# Patient Record
Sex: FEMALE | Race: BLACK OR AFRICAN AMERICAN | NOT HISPANIC OR LATINO | Employment: FULL TIME | URBAN - METROPOLITAN AREA
[De-identification: names, ages, dates, MRNs, and addresses within clinical notes are randomized per-mention and may not be internally consistent; named-entity substitution may affect disease eponyms.]

---

## 2017-02-28 ENCOUNTER — OFFICE VISIT (OUTPATIENT)
Dept: OBGYN | Facility: CLINIC | Age: 42
End: 2017-02-28
Payer: COMMERCIAL

## 2017-02-28 VITALS
WEIGHT: 158.3 LBS | TEMPERATURE: 98.7 F | HEART RATE: 70 BPM | BODY MASS INDEX: 28.05 KG/M2 | OXYGEN SATURATION: 100 % | HEIGHT: 63 IN | SYSTOLIC BLOOD PRESSURE: 118 MMHG | DIASTOLIC BLOOD PRESSURE: 80 MMHG

## 2017-02-28 DIAGNOSIS — Z01.419 ENCOUNTER FOR GYNECOLOGICAL EXAMINATION WITHOUT ABNORMAL FINDING: Primary | ICD-10-CM

## 2017-02-28 LAB
ALBUMIN SERPL-MCNC: 3.7 G/DL (ref 3.4–5)
ALP SERPL-CCNC: 75 U/L (ref 40–150)
ALT SERPL W P-5'-P-CCNC: 19 U/L (ref 0–50)
ANION GAP SERPL CALCULATED.3IONS-SCNC: 6 MMOL/L (ref 3–14)
AST SERPL W P-5'-P-CCNC: 17 U/L (ref 0–45)
BILIRUB SERPL-MCNC: 0.6 MG/DL (ref 0.2–1.3)
BUN SERPL-MCNC: 6 MG/DL (ref 7–30)
CALCIUM SERPL-MCNC: 8.6 MG/DL (ref 8.5–10.1)
CHLORIDE SERPL-SCNC: 106 MMOL/L (ref 94–109)
CHOLEST SERPL-MCNC: 169 MG/DL
CO2 SERPL-SCNC: 27 MMOL/L (ref 20–32)
CREAT SERPL-MCNC: 0.8 MG/DL (ref 0.52–1.04)
ERYTHROCYTE [DISTWIDTH] IN BLOOD BY AUTOMATED COUNT: 15.2 % (ref 10–15)
GFR SERPL CREATININE-BSD FRML MDRD: 79 ML/MIN/1.7M2
GLUCOSE SERPL-MCNC: 94 MG/DL (ref 70–99)
HCT VFR BLD AUTO: 36.8 % (ref 35–47)
HDLC SERPL-MCNC: 67 MG/DL
HGB BLD-MCNC: 11.6 G/DL (ref 11.7–15.7)
LDLC SERPL CALC-MCNC: 90 MG/DL
MCH RBC QN AUTO: 26.7 PG (ref 26.5–33)
MCHC RBC AUTO-ENTMCNC: 31.5 G/DL (ref 31.5–36.5)
MCV RBC AUTO: 85 FL (ref 78–100)
MICRO REPORT STATUS: NORMAL
NONHDLC SERPL-MCNC: 102 MG/DL
PLATELET # BLD AUTO: 191 10E9/L (ref 150–450)
POTASSIUM SERPL-SCNC: 3.8 MMOL/L (ref 3.4–5.3)
PROT SERPL-MCNC: 7.6 G/DL (ref 6.8–8.8)
RBC # BLD AUTO: 4.35 10E12/L (ref 3.8–5.2)
SODIUM SERPL-SCNC: 139 MMOL/L (ref 133–144)
SPECIMEN SOURCE: NORMAL
T4 FREE SERPL-MCNC: 0.91 NG/DL (ref 0.76–1.46)
TRIGL SERPL-MCNC: 62 MG/DL
TSH SERPL DL<=0.005 MIU/L-ACNC: 4.13 MU/L (ref 0.4–4)
WBC # BLD AUTO: 3.6 10E9/L (ref 4–11)
WET PREP SPEC: NORMAL

## 2017-02-28 PROCEDURE — 99396 PREV VISIT EST AGE 40-64: CPT | Performed by: OBSTETRICS & GYNECOLOGY

## 2017-02-28 PROCEDURE — 36415 COLL VENOUS BLD VENIPUNCTURE: CPT | Performed by: OBSTETRICS & GYNECOLOGY

## 2017-02-28 PROCEDURE — 80053 COMPREHEN METABOLIC PANEL: CPT | Performed by: OBSTETRICS & GYNECOLOGY

## 2017-02-28 PROCEDURE — 87591 N.GONORRHOEAE DNA AMP PROB: CPT | Performed by: OBSTETRICS & GYNECOLOGY

## 2017-02-28 PROCEDURE — 87491 CHLMYD TRACH DNA AMP PROBE: CPT | Performed by: OBSTETRICS & GYNECOLOGY

## 2017-02-28 PROCEDURE — 84443 ASSAY THYROID STIM HORMONE: CPT | Performed by: OBSTETRICS & GYNECOLOGY

## 2017-02-28 PROCEDURE — 85027 COMPLETE CBC AUTOMATED: CPT | Performed by: OBSTETRICS & GYNECOLOGY

## 2017-02-28 PROCEDURE — 80061 LIPID PANEL: CPT | Performed by: OBSTETRICS & GYNECOLOGY

## 2017-02-28 PROCEDURE — 87210 SMEAR WET MOUNT SALINE/INK: CPT | Performed by: OBSTETRICS & GYNECOLOGY

## 2017-02-28 PROCEDURE — G0145 SCR C/V CYTO,THINLAYER,RESCR: HCPCS | Performed by: OBSTETRICS & GYNECOLOGY

## 2017-02-28 PROCEDURE — 84439 ASSAY OF FREE THYROXINE: CPT | Performed by: OBSTETRICS & GYNECOLOGY

## 2017-02-28 PROCEDURE — 87624 HPV HI-RISK TYP POOLED RSLT: CPT | Performed by: OBSTETRICS & GYNECOLOGY

## 2017-02-28 RX ORDER — FOLIC ACID 1 MG/1
1 TABLET ORAL DAILY
Qty: 100 TABLET | Refills: 3 | Status: SHIPPED | OUTPATIENT
Start: 2017-02-28 | End: 2018-04-17

## 2017-02-28 NOTE — PATIENT INSTRUCTIONS
If you have any questions regarding your visit, Please contact your care team.    Women s Health CLINIC HOURS TELEPHONE NUMBER   Denise Agbeh, M.D.    America Roberts- SAUL Miles - SAUL Miles -         Monday-[facility]  8:00 am - 5 pm  Tuesday- [facility]  8:00am- 5 pm  Wednesday- Off  Thursday- [facility]  8:00 am- 5 pm  Friday-[place]  8:00 am 5 pm [facility]  [address] [place] [address]  [phone] ask for Women's [facility]    Imaging [phone]    [facility]  [address] [facility]  [place] [address] [phone]  Imaging [phone]     Urgent Care locations:    [facility] Saturday and Sunday   9 am - 5 pm    Monday-Friday   12 pm - 8 pm  Saturday and Sunday   9 am - 5 pm   [phone]       If you need a medication refill, please contact your pharmacy. Please allow 3 business days for your refill to be completed.  As always, Thank you for trusting us with your healthcare needs!

## 2017-02-28 NOTE — PROGRESS NOTES
"Elizabeth is a 41 year old  here for annual exam.   She is seeing  Infertility. As had some IUI and has upcoming appointment to discuss IVF.  Complaints of constipation and heartburn. Takes Miralalx and prilosec.  Back pains with ETOF . Will stop  Vaginal discharge.    ROS: Ten point review of systems was reviewed and negative except the above.    Health Maintenance   Topic Date Due     INFLUENZA VACCINE (SYSTEM ASSIGNED)  2016     PAP SCREENING Q3 YR (SYSTEM ASSIGNED)  2019     TETANUS IMMUNIZATION (SYSTEM ASSIGNED)  2024      Last pap:   Last Mammogram:   Last Dexa: none  Last Colonoscopy: none  Lab Results   Component Value Date    CHOL 169 2017     Lab Results   Component Value Date    HDL 67 2017     Lab Results   Component Value Date    LDL 90 2017     Lab Results   Component Value Date    TRIG 62 2017     No results found for: CHOLHDLRATIO      OBHX:    Obstetric History       T1      TAB0   SAB0   E0   M0   L2       # Outcome Date GA Lbr Rosales/2nd Weight Sex Delivery Anes PTL Lv   4 Term 03    M -SEC   Y   3 AB            2 AB            1 AB                   Past Surgical History   Procedure Laterality Date     Gyn surgery        x 2; fibroid surgeries x 1       PMH: Her past medical, surgical, and obstetric histories were reviewed and are documented in their appropriate chart areas.    ALL/Meds: Her medication and allergy histories were reviewed and are documented in their appropriate chart areas.    SH/FMH: Her social and family history was reviewed and documented in its appropriate chart area.    PE: /80  Pulse 70  Temp 98.7  F (37.1  C) (Oral)  Ht 1.6 m (5' 2.99\")  Wt 71.8 kg (158 lb 4.8 oz)  LMP 2017  SpO2 100%  BMI 28.05 kg/m2  Body mass index is 28.05 kg/(m^2).    General Appearance:  healthy, alert, active, no distress  Cardiovascular:  Regular rate and Rhythm  Neck: Supple, no adenopathy and " thyroid normal  Lungs:  Clear, without wheeze, rale or rhonchi  Breast: normal breast exam  Abdomen: Benign, Soft, flat, non-tender, No masses, organomegaly, No inguinal nodes and Bowel sounds normoactiveSoft, nontender.   Pelvic:       - Ext: Vulva and perineum are normal without lesion, mass or discharge        - Urethra: normal without discharge or scarring or hypermobility       - Urethral Meatus: normal appearance,        - Bladder: no tenderness, no masses       - Vagina: Normal mucosa, no discharge     rugated       - Cervix: nulliparous       - Uterus:Normal shape, position and consistencyfirm, nontender, nongravid uterus without CMT       - Adnexa: Normal without masses or tenderness       - Rectal: deferred    A/P:  Well Woman,     ICD-10-CM    1. Encounter for gynecological examination without abnormal finding Z01.419 CBC with platelets     Chlamydia trachomatis PCR     Comprehensive metabolic panel     Neisseria gonorrhoeae PCR     Lipid panel reflex to direct LDL     TSH with free T4 reflex     Wet prep     Pap imaged thin layer screen with HPV - recommended age 30 - 65 years (select HPV order below)     HPV High Risk Types DNA Cervical     folic acid (FOLVITE) 1 MG tablet     *MA Screening Digital Bilateral     T4 free     Normal wet prep.   - Encouraged self-breast exam   - Encouraged low fat diet, regular exercise, and adequate calcium intake.    CEPHAS AGBEH, MD.

## 2017-02-28 NOTE — MR AVS SNAPSHOT
After Visit Summary   2/28/2017    Elizabeth Jerome    MRN: [id number]           Patient Information     Date Of Birth          1975        Visit Information        Provider Department      2/28/2017 11:30 AM Agbeh, Cephas Mawuena, MD [facility]'s Diagnoses     Encounter for gynecological examination without abnormal finding    -  1      Care Instructions                                                           If you have any questions regarding your visit, Please contact your care team.    Women s Health CLINIC HOURS TELEPHONE NUMBER   Cephas Agbeh, M.D.    America Miles - SAUL Miles -         Monday-[facility]  8:00 am - 5 pm  Tuesday- [facility]  8:00am- 5 pm  Wednesday- Off  Thursday- [facility]  8:00 am- 5 pm  Friday-[place]  8:00 am 5 pm [facility]  [address] [place] [address]  [phone] ask for Women's Clinic    Imaging [phone]    [facility]  [address] [facility]  [place] [address]  [phone]  Imaging [phone]     Urgent Care locations:    [facility] Saturday and Sunday   9 am - 5 pm    Monday-Friday   12 pm - 8 pm  Saturday and Sunday   9 am - 5 pm   [phone]       If you need a medication refill, please contact your pharmacy. Please allow 3 business days for your refill to be completed.  As always, Thank you for trusting us with your healthcare needs!                Follow-ups after your visit        Follow-up notes from your care team     Return in about 1 year (around 2/28/2018).      Future tests that were ordered for you today     Open Future Orders        Priority Expected Expires Ordered    *MA Screening Digital Bilateral Routine  2/28/2018 2/28/2017            Who to contact     If you have questions or need follow up information about today's clinic visit or your schedule please  "contact [facility] directly at [phone].  Normal or non-critical lab and imaging results will be communicated to you by MyChart, letter or phone within 4 business days after the clinic has received the results. If you do not hear from us within 7 days, please contact the clinic through MyChart or phone. If you have a critical or abnormal lab result, we will notify you by phone as soon as possible.  Submit refill requests through [employer]Ranks or call your pharmacy and they will forward the refill request to us. Please allow 3 business days for your refill to be completed.          Additional Information About Your Visit        [employer] Information     TipRanks lets you send messages to your doctor, view your test results, renew your prescriptions, schedule appointments and more. To sign up, go to [email or web address] . Click on \"Log in\" on the left side of the screen, which will take you to the Welcome page. Then click on \"Sign up Now\" on the right side of the page.     You will be asked to enter the access code listed below, as well as some personal information. Please follow the directions to create your username and password.     Your access code is: [email or web address]  Expires: 2017  1:13 PM     Your access code will  in 90 days. If you need help or a new code, please call your [place] clinic or [phone].        Care EveryWhere ID     This is your Care EveryWhere ID. This could be used by other organizations to access your [place] medical records  [id number]        Your Vitals Were     Pulse Temperature Height Last Period Pulse Oximetry BMI (Body Mass Index)    70 98.7  F (37.1  C) (Oral) 1.6 m (5' 2.99\") 2017 100% 28.05 kg/m2       Blood Pressure from Last 3 Encounters:   17 118/80   10/24/16 106/68   16 118/70    Weight from Last 3 Encounters:   17 71.8 kg (158 lb 4.8 oz)   10/24/16 71.8 kg (158 lb 6.4 oz)   16 70.6 kg (155 lb 11.2 oz)              We " Performed the Following     CBC with platelets     Chlamydia trachomatis PCR     Comprehensive metabolic panel     HPV High Risk Types DNA Cervical     Lipid panel reflex to direct LDL     Neisseria gonorrhoeae PCR     Pap imaged thin layer screen with HPV - recommended age 30 - 65 years (select HPV order below)     T4 free     TSH with free T4 reflex     Wet prep          Today's Medication Changes          These changes are accurate as of: 2/28/17  1:13 PM.  If you have any questions, ask your nurse or doctor.               These medicines have changed or have updated prescriptions.        Dose/Directions    * folic acid 1 MG tablet   Commonly known as:  FOLVITE   This may have changed:  Another medication with the same name was added. Make sure you understand how and when to take each.   Changed by:  Jose Alberto Francois MD        Dose:  1 mg   Take 1 tablet (1 mg) by mouth daily   Quantity:  100 tablet   Refills:  3       * folic acid 1 MG tablet   Commonly known as:  FOLVITE   This may have changed:  You were already taking a medication with the same name, and this prescription was added. Make sure you understand how and when to take each.   Used for:  Encounter for gynecological examination without abnormal finding   Changed by:  Agbeh, Cephas Mawuena, MD        Dose:  1 mg   Take 1 tablet (1 mg) by mouth daily   Quantity:  100 tablet   Refills:  3       * Notice:  This list has 2 medication(s) that are the same as other medications prescribed for you. Read the directions carefully, and ask your doctor or other care provider to review them with you.         Where to get your medicines      These medications were sent to [facility] [address] [place] [address] [address] [facility] [address] [place] [address]  [address] [facility] [address] [address] [facility] [address]     Phone:  [phone]     folic acid 1 MG tablet                Primary Care Provider Office Phone # Fax #    Jose Ablerto Francois [phone]       [id number]  [facility]  [facility] [address]        Thank you!     Thank you for choosing [facility]  for your care. Our goal is always to provide you with excellent care. Hearing back from our patients is one way we can continue to improve our services. Please take a few minutes to complete the written survey that you may receive in the mail after your visit with us. Thank you!             Your Updated Medication List - Protect others around you: Learn how to safely use, store and throw away your medicines at www.disposemymeds.org.          This list is accurate as of: 2/28/17  1:13 PM.  Always use your most recent med list.                   Brand Name Dispense Instructions for use    * folic acid 1 MG tablet    FOLVITE    100 tablet    Take 1 tablet (1 mg) by mouth daily       * folic acid 1 MG tablet    FOLVITE    100 tablet    Take 1 tablet (1 mg) by mouth daily       * Notice:  This list has 2 medication(s) that are the same as other medications prescribed for you. Read the directions carefully, and ask your doctor or other care provider to review them with you.

## 2017-02-28 NOTE — NURSING NOTE
"Chief Complaint   Patient presents with     Physical     Pap       Initial /80  Pulse 70  Temp 98.7  F (37.1  C) (Oral)  Ht 1.6 m (5' 2.99\")  Wt 71.8 kg (158 lb 4.8 oz)  LMP 02/05/2017  SpO2 100%  BMI 28.05 kg/m2 Estimated body mass index is 28.05 kg/(m^2) as calculated from the following:    Height as of this encounter: 1.6 m (5' 2.99\").    Weight as of this encounter: 71.8 kg (158 lb 4.8 oz).  Medication Reconciliation: complete   Sherrie Valadez CMA      "

## 2017-02-28 NOTE — LETTER
[facility]  [address] [place] [id number]  [phone]      March 7, 2017    Elizabeth Jerome  [address] [place] [address]    Dear Elizabeth,  We are happy to inform you that your PAP smear result from 2/28/17 is normal.  We are now able to do a follow up test on PAP smears. The DNA test is for HPV (Human Papilloma Virus). Cervical cancer is closely linked with certain types of HPV. Your result showed no evidence of high risk HPV.  Therefore we recommend you return in 3 years for your next pap smear.  You will still need to return to the clinic every year for an annual exam and other preventive tests.  Please contact the clinic with any questions.  Sincerely,  Cephas Mawuena Agbeh, MD/lauren

## 2017-03-01 LAB
C TRACH DNA SPEC QL NAA+PROBE: NORMAL
N GONORRHOEA DNA SPEC QL NAA+PROBE: NORMAL
SPECIMEN SOURCE: NORMAL
SPECIMEN SOURCE: NORMAL

## 2017-03-01 ASSESSMENT — PATIENT HEALTH QUESTIONNAIRE - PHQ9: SUM OF ALL RESPONSES TO PHQ QUESTIONS 1-9: 12

## 2017-03-02 LAB
COPATH REPORT: NORMAL
PAP: NORMAL

## 2017-03-06 LAB
FINAL DIAGNOSIS: NORMAL
HPV HR 12 DNA CVX QL NAA+PROBE: NEGATIVE
HPV16 DNA SPEC QL NAA+PROBE: NEGATIVE
HPV18 DNA SPEC QL NAA+PROBE: NEGATIVE
SPECIMEN DESCRIPTION: NORMAL

## 2017-03-16 ENCOUNTER — RADIANT APPOINTMENT (OUTPATIENT)
Dept: MAMMOGRAPHY | Facility: CLINIC | Age: 42
End: 2017-03-16
Attending: OBSTETRICS & GYNECOLOGY
Payer: COMMERCIAL

## 2017-03-16 DIAGNOSIS — Z01.419 ENCOUNTER FOR GYNECOLOGICAL EXAMINATION WITHOUT ABNORMAL FINDING: ICD-10-CM

## 2017-03-16 PROCEDURE — G0202 SCR MAMMO BI INCL CAD: HCPCS

## 2017-08-22 ENCOUNTER — OFFICE VISIT (OUTPATIENT)
Dept: OBGYN | Facility: CLINIC | Age: 42
End: 2017-08-22
Payer: COMMERCIAL

## 2017-08-22 VITALS
SYSTOLIC BLOOD PRESSURE: 116 MMHG | WEIGHT: 161.3 LBS | BODY MASS INDEX: 28.58 KG/M2 | OXYGEN SATURATION: 100 % | HEART RATE: 72 BPM | TEMPERATURE: 98.6 F | DIASTOLIC BLOOD PRESSURE: 78 MMHG

## 2017-08-22 DIAGNOSIS — N97.9 FEMALE INFERTILITY: Primary | ICD-10-CM

## 2017-08-22 PROCEDURE — 99214 OFFICE O/P EST MOD 30 MIN: CPT | Performed by: OBSTETRICS & GYNECOLOGY

## 2017-08-22 RX ORDER — MULTIPLE VITAMINS W/ MINERALS TAB 9MG-400MCG
1 TAB ORAL DAILY
COMMUNITY
End: 2019-09-04

## 2017-08-22 NOTE — NURSING NOTE
"Chief Complaint   Patient presents with     Consult     Infertility       Initial /78  Pulse 72  Temp 98.6  F (37  C) (Oral)  Wt 73.2 kg (161 lb 4.8 oz)  LMP 08/17/2017  SpO2 100%  BMI 28.58 kg/m2 Estimated body mass index is 28.58 kg/(m^2) as calculated from the following:    Height as of 2/28/17: 1.6 m (5' 2.99\").    Weight as of this encounter: 73.2 kg (161 lb 4.8 oz).  Medication Reconciliation: complete   Sherrie Valadez CMA      "

## 2017-08-22 NOTE — PATIENT INSTRUCTIONS
If you have any questions regarding your visit, Please contact your care team.    Women s Health CLINIC HOURS TELEPHONE NUMBER   Denises Agbeh, M.D.    America Roberts- SAUL Wei - SAUL Miles -         Monday-[facility]  8:00 am - 5 pm  Tuesday- [facility]  8:00am- 5 pm  Wednesday- Off  Thursday- [facility]  8:00 am- 5 pm  Friday-[place]  8:00 am 5 pm [facility]  [address] [place] [address]  [phone] ask for Women's [facility]    Imaging [phone]    [facility]  [address] [facility]  [place] [address] [phone]  Imaging [phone]     Urgent Care locations:    [facility] Saturday and Sunday   9 am - 5 pm    Monday-Friday   12 pm - 8 pm  Saturday and Sunday   9 am - 5 pm   [phone]       If you need a medication refill, please contact your pharmacy. Please allow 3 business days for your refill to be completed.  As always, Thank you for trusting us with your healthcare needs!

## 2017-08-22 NOTE — PROGRESS NOTES
Elizabeth is a 42 year old  referred here by self for consultation regarding infertility.  [facility]  Her next step she was advised was IVF.   She wants to know if there are other options before IVF.  She said the Infertility specialist told her the difficulty in getting pregnant is her age.    ROS: Ten point review of systems was reviewed and negative except the above.    Gyne: - abn pap (last pap ), - STD's    Past Medical History:   Diagnosis Date     NO ACTIVE PROBLEMS      Past Surgical History:   Procedure Laterality Date     GYN SURGERY       x 2; fibroid surgeries x 1     Patient Active Problem List   Diagnosis     Intramural leiomyoma of uterus       ALL/Meds: Her medication and allergy histories were reviewed and are documented in their appropriate chart areas.    SH: - tob, - EtOH,     FH: Her family history was reviewed and documented in its appropriate chart area.    PE: /78  Pulse 72  Temp 98.6  F (37  C) (Oral)  Wt 73.2 kg (161 lb 4.8 oz)  LMP 2017  SpO2 100%  BMI 28.58 kg/m2  Body mass index is 28.58 kg/(m^2).      General:  WNWD female, NAD  Alert  Oriented x 3  Gait:  Normal  Skin:  Normal skin turgor  HEENT:  NC/AT, EOMI  Abdomen:  Non-tender, non-distended.  Pelvic exam:  Not performed  Extremities:  No clubbing, no cyanosis and no edema.    A/P    ICD-10-CM    1. Female infertility N97.9      We discussed  RBA of Advance Maternal age with pregnancy.  We also discussed other options of surrogate pregnancy vs donor egg from a youger woman.  We also discussed adoption as another option as having a baby.  She is leaning towards IVF with [facility].    25 minutes was spent face to face with the patient today discussing her history, diagnosis, and follow-up plan as noted above.  Over 50% of the visit was spent in counseling and coordination of care.    Total Visit Time: 30  minutes.    CEPHAS AGBEH, MD.

## 2017-08-22 NOTE — MR AVS SNAPSHOT
After Visit Summary   8/22/2017    Elizabeth Jerome    MRN: [id number]           Patient Information     Date Of Birth          1975        Visit Information        Provider Department      8/22/2017 1:30 PM Agbeh, Cephas Mawuena, MD [facility]        Today's Diagnoses     Female infertility    -  1      Care Instructions                                                           If you have any questions regarding your visit, Please contact your care team.    Women s Health CLINIC HOURS TELEPHONE NUMBER   Cephas Agbeh, M.D.    [place] - GREGORIO Wei - SAUL Miles -         Monday-[facility]  8:00 am - 5 pm  Tuesday- [facility]  8:00am- 5 pm  Wednesday- Off  Thursday- [facility]  8:00 am- 5 pm  Friday-[place]  8:00 am 5 pm [facility]  [address]  [place], [place] [address] [phone] ask for [facility]    Imaging [phone]    [facility]  [address] [facility]  [place] [address] [phone]  Imaging [phone]     Urgent Care locations:    [facility] Saturday and Sunday   9 am - 5 pm    Monday-Friday   12 pm - 8 pm  Saturday and Sunday   9 am - 5 pm   [phone]       If you need a medication refill, please contact your pharmacy. Please allow 3 business days for your refill to be completed.  As always, Thank you for trusting us with your healthcare needs!                  Follow-ups after your visit        Who to contact     If you have questions or need follow up information about today's clinic visit or your schedule please contact [facility] directly at [phone].  Normal or non-critical lab and imaging results will be communicated to you by MyChart, letter or phone within 4 business days after the clinic has received the results. If you do not hear from us within 7 days, please contact the clinic through [employer] or  "phone. If you have a critical or abnormal lab result, we will notify you by phone as soon as possible.  Submit refill requests through [employer] or call your pharmacy and they will forward the refill request to us. Please allow 3 business days for your refill to be completed.          Additional Information About Your Visit        [employer] Information     [employer] lets you send messages to your doctor, view your test results, renew your prescriptions, schedule appointments and more. To sign up, go to [email or web address] . Click on \"Log in\" on the left side of the screen, which will take you to the Welcome page. Then click on \"Sign up Now\" on the right side of the page.     You will be asked to enter the access code listed below, as well as some personal information. Please follow the directions to create your username and password.     Your access code is: [email or web address]  Expires: 2017  2:19 PM     Your access code will  in 90 days. If you need help or a new code, please call your [place] clinic or [phone].        Care EveryWhere ID     This is your Care EveryWhere ID. This could be used by other organizations to access your [place] medical records  [id number]        Your Vitals Were     Pulse Temperature Last Period Pulse Oximetry BMI (Body Mass Index)       72 98.6  F (37  C) (Oral) 2017 100% 28.58 kg/m2        Blood Pressure from Last 3 Encounters:   17 116/78   17 118/80   10/24/16 106/68    Weight from Last 3 Encounters:   17 73.2 kg (161 lb 4.8 oz)   17 71.8 kg (158 lb 4.8 oz)   10/24/16 71.8 kg (158 lb 6.4 oz)              Today, you had the following     No orders found for display       Primary Care Provider Office Phone # Fax #    Denise Mawuena Agbeh, [phone] [address] [place] [address]        Equal Access to Services     SAMANTHA SHELL AH: roque Ventura, raúl augustinmada adeegyada, waxay " janette gomezkeny w[place]'aan ah. [facility] [phone].    ATENCIÓN: Si serala eleno, tiene a ibrahim disposición servicios gratuitos de asistencia lingüística. Priti al [phone].    We comply with applicable federal civil rights laws and [place] laws. We do not discriminate on the basis of race, color, national origin, age, disability sex, sexual orientation or gender identity.            Thank you!     Thank you for choosing [facility]  for your care. Our goal is always to provide you with excellent care. Hearing back from our patients is one way we can continue to improve our services. Please take a few minutes to complete the written survey that you may receive in the mail after your visit with us. Thank you!             Your Updated Medication List - Protect others around you: Learn how to safely use, store and throw away your medicines at www.disposemymeds.org.          This list is accurate as of: 8/22/17  2:19 PM.  Always use your most recent med list.                   Brand Name Dispense Instructions for use Diagnosis    * folic acid 1 MG tablet    FOLVITE    100 tablet    Take 1 tablet (1 mg) by mouth daily        * folic acid 1 MG tablet    FOLVITE    100 tablet    Take 1 tablet (1 mg) by mouth daily    Encounter for gynecological examination without abnormal finding       Multi-vitamin Tabs tablet      Take 1 tablet by mouth daily        * Notice:  This list has 2 medication(s) that are the same as other medications prescribed for you. Read the directions carefully, and ask your doctor or other care provider to review them with you.

## 2017-12-21 DIAGNOSIS — Z31.41 FERTILITY TESTING: Primary | ICD-10-CM

## 2017-12-22 DIAGNOSIS — Z31.41 FERTILITY TESTING: ICD-10-CM

## 2017-12-22 LAB
ESTRADIOL SERPL-MCNC: 36 PG/ML
FSH SERPL-ACNC: 6 IU/L

## 2017-12-22 PROCEDURE — 83001 ASSAY OF GONADOTROPIN (FSH): CPT | Performed by: OBSTETRICS & GYNECOLOGY

## 2017-12-22 PROCEDURE — 99000 SPECIMEN HANDLING OFFICE-LAB: CPT | Performed by: OBSTETRICS & GYNECOLOGY

## 2017-12-22 PROCEDURE — 83520 IMMUNOASSAY QUANT NOS NONAB: CPT | Mod: 90 | Performed by: OBSTETRICS & GYNECOLOGY

## 2017-12-22 PROCEDURE — 36415 COLL VENOUS BLD VENIPUNCTURE: CPT | Performed by: OBSTETRICS & GYNECOLOGY

## 2017-12-22 PROCEDURE — 82670 ASSAY OF TOTAL ESTRADIOL: CPT | Performed by: OBSTETRICS & GYNECOLOGY

## 2017-12-24 LAB — MIS SERPL-MCNC: 2.74 NG/ML

## 2018-03-20 ENCOUNTER — OFFICE VISIT (OUTPATIENT)
Dept: OBGYN | Facility: CLINIC | Age: 43
End: 2018-03-20
Payer: COMMERCIAL

## 2018-03-20 VITALS
SYSTOLIC BLOOD PRESSURE: 122 MMHG | WEIGHT: 162 LBS | HEIGHT: 63 IN | DIASTOLIC BLOOD PRESSURE: 82 MMHG | HEART RATE: 72 BPM | BODY MASS INDEX: 28.7 KG/M2

## 2018-03-20 DIAGNOSIS — N97.9 INFERTILITY, FEMALE, SECONDARY: Primary | ICD-10-CM

## 2018-03-20 PROCEDURE — 99213 OFFICE O/P EST LOW 20 MIN: CPT | Performed by: OBSTETRICS & GYNECOLOGY

## 2018-03-20 ASSESSMENT — ANXIETY QUESTIONNAIRES
7. FEELING AFRAID AS IF SOMETHING AWFUL MIGHT HAPPEN: NOT AT ALL
6. BECOMING EASILY ANNOYED OR IRRITABLE: NOT AT ALL
3. WORRYING TOO MUCH ABOUT DIFFERENT THINGS: NOT AT ALL
5. BEING SO RESTLESS THAT IT IS HARD TO SIT STILL: NOT AT ALL
2. NOT BEING ABLE TO STOP OR CONTROL WORRYING: NOT AT ALL
GAD7 TOTAL SCORE: 0
IF YOU CHECKED OFF ANY PROBLEMS ON THIS QUESTIONNAIRE, HOW DIFFICULT HAVE THESE PROBLEMS MADE IT FOR YOU TO DO YOUR WORK, TAKE CARE OF THINGS AT HOME, OR GET ALONG WITH OTHER PEOPLE: NOT DIFFICULT AT ALL
1. FEELING NERVOUS, ANXIOUS, OR ON EDGE: NOT AT ALL

## 2018-03-20 ASSESSMENT — PATIENT HEALTH QUESTIONNAIRE - PHQ9: 5. POOR APPETITE OR OVEREATING: NOT AT ALL

## 2018-03-20 NOTE — PROGRESS NOTES
SUBJECTIVE:                                                   Elizabeth Jerome is a 42 year old female who presents to clinic today for the following health issue(s):  Patient presents with:  Consult: Discuss infertility- has been trying to get pregnant for about 2.5 yrs      HPI:  Elizabeth presents for second or essentially third opinion on secondary infertility.  She states that she had difficulty conceiving initially but after a procedure on her cervix in [place] in  for cervical dysplasia and after open myomectomy also in  she was able to conceive and she subsequently had a  section in  with a different partner.  At [facility] in 2014 she had another open myomectomy for removal of additional fibroids.  With her current fiancé she has been attempting to conceive for the past 2-1/2 years.  She has had a normal hysterosalpingogram she has a normal antimalarial hormone level of 2.7 and has a normal FSH and estradiol level.  She states that she had 3 rounds of intrauterine insemination using Clomid at [facility] with no successful conception.  She also states that her current partner may have had a low sperm count.  She obtained a second opinion from another reproductive specialist who recommended IVF using her eggs or with donor eggs.  Resents today wondering if her cervix is occluded and that is why she is not able to conceive.  She is hesitant to proceed with IVF fertilization because of the high cost and the lack of guarantee given her age.    Patient's last menstrual period was 2018 (exact date)..   Patient is sexually active, .  Using none for contraception.    reports that she has never smoked. She has never used smokeless tobacco.    STD testing offered?  Declined    Health maintenance updated:  yes    Today's PHQ-2 Score:   PHQ-2 (  [employer]) 3/7/2016   Q1: Little interest or pleasure in doing things 0   Q2: Feeling down, depressed or hopeless 0   PHQ-2 Score 0  "    Today's PHQ-9 Score:   PHQ-9 SCORE 3/20/2018   Total Score 6     Today's MILEY-7 Score:   MILEY-7 SCORE 3/20/2018   Total Score 0       Problem list and histories reviewed & adjusted, as indicated.  Additional history: as documented.    Patient Active Problem List   Diagnosis     Intramural leiomyoma of uterus     Past Surgical History:   Procedure Laterality Date      SECTION  2003     GYN SURGERY  10/2014, 2002    Abdominal Myomectomies x 2      Social History   Substance Use Topics     Smoking status: Never Smoker     Smokeless tobacco: Never Used     Alcohol use 0.0 oz/week     0 Standard drinks or equivalent per week      Comment: rare      Problem (# of Occurrences) Relation (Name,Age of Onset)    DIABETES (1) Maternal Grandmother    Hyperlipidemia (2) Mother, Brother    Hypertension (2) Mother, Father            Current Outpatient Prescriptions   Medication Sig     Ferrous Sulfate (IRON SUPPLEMENT PO)      multivitamin, therapeutic with minerals (MULTI-VITAMIN) TABS tablet Take 1 tablet by mouth daily     folic acid (FOLVITE) 1 MG tablet Take 1 tablet (1 mg) by mouth daily     No current facility-administered medications for this visit.      No Known Allergies    ROS:  12 point review of systems negative other than symptoms noted below.    OBJECTIVE:     /82  Pulse 72  Ht 5' 2.99\" (1.6 m)  Wt 162 lb (73.5 kg)  LMP 2018 (Exact Date)  BMI 28.71 kg/m2  Body mass index is 28.71 kg/(m^2).    Exam:  Constitutional:  Appearance: Well nourished, well developed alert, in no acute distress  Skin:General Inspection:  No rashes present, no lesions present, no areas of discoloration; Genitalia and Groin:  No rashes present, no lesions present, no areas of discoloration, no masses present.  Neurologic/Psychiatric:  Mental Status:  Oriented X3   Pelvic Exam:  External Genitalia:     Normal appearance for age, no discharge present, no tenderness present, no inflammatory lesions present, " color normal  Vagina:     Normal vaginal vault without central or paravaginal defects, no discharge present, no inflammatory lesions present, no masses present  Bladder:     Nontender to palpation  Urethra:   Urethral Body:  Urethra palpation normal, urethra structural support normal   Urethral Meatus:  No erythema or lesions present  Cervix:     Appearance healthy, no lesions present, nontender to palpation, no bleeding present, cervix was cleansed with betadine and the anterior lip of the cervix was grasped with a single toothed tenaculum and an os finder was used to dilate an initially stenotic cervix. The uterine sound was then advanced and uterus sounded to 8 cm.  Uterus:     Uterus: firm, normal sized and nontender, midplane in position.   Adnexa:     No adnexal tenderness present, no adnexal masses present  Pubic Hair:     shaved  Genitalia and Groin:     No rashes present, no lesions present, no areas of discoloration, no masses present       In-Clinic Test Results:  No results found for this or any previous visit (from the past 24 hour(s)).    ASSESSMENT/PLAN:                                                        ICD-10-CM    1. Infertility, female, secondary N97.9 CANCELED: Anti-Mullerian hormone     CANCELED: TSH with free T4 reflex   Elizabeth was counseled that her best chance for successful conception would be IVF especially in the setting of a male factor. I explained that her cervix appeared normal and the slight stenosis of her internal os was not outside of the range of normal. She was asked to discuss with her partner about their desire to conceive and to return to the Infertility Specialist of her choosing.    Laura Mixon MD  [facility] [place]

## 2018-03-20 NOTE — MR AVS SNAPSHOT
"              After Visit Summary   3/20/2018    Elizabeth Jerome    MRN: [id number]           Patient Information     Date Of Birth          1975        Visit Information        Provider Department      3/20/2018 11:00 AM Laura Mixon MD [facility]        Today's Diagnoses     Infertility, female, secondary    -  1       Follow-ups after your visit        Who to contact     If you have questions or need follow up information about today's clinic visit or your schedule please contact [facility] directly at [phone].  Normal or non-critical lab and imaging results will be communicated to you by [employer], letter or phone within 4 business days after the clinic has received the results. If you do not hear from us within 7 days, please contact the clinic through [employer] or phone. If you have a critical or abnormal lab result, we will notify you by phone as soon as possible.  Submit refill requests through [employer] or call your pharmacy and they will forward the refill request to us. Please allow 3 business days for your refill to be completed.          Additional Information About Your Visit        MyChart Information     [employer] lets you send messages to your doctor, view your test results, renew your prescriptions, schedule appointments and more. To sign up, go to [email or web address] . Click on \"Log in\" on the left side of the screen, which will take you to the Welcome page. Then click on \"Sign up Now\" on the right side of the page.     You will be asked to enter the access code listed below, as well as some personal information. Please follow the directions to create your username and password.     Your access code is: [email or web address]  Expires: 2018  1:39 PM     Your access code will  in 90 days. If you need help or a new code, please call your [place] clinic or [phone].        Care EveryWhere ID     This is your Care EveryWhere ID. This " "could be used by other organizations to access your [place] medical records  [id number]        Your Vitals Were     Pulse Height Last Period BMI (Body Mass Index)          72 5' 2.99\" (1.6 m) 03/14/2018 (Exact Date) 28.71 kg/m2         Blood Pressure from Last 3 Encounters:   03/20/18 122/82   08/22/17 116/78   02/28/17 118/80    Weight from Last 3 Encounters:   03/20/18 162 lb (73.5 kg)   08/22/17 161 lb 4.8 oz (73.2 kg)   02/28/17 158 lb 4.8 oz (71.8 kg)              Today, you had the following     No orders found for display       Primary Care Provider Office Phone # Fax #    Denise Mawuena Agbeh, [phone] [address] [place] [address]        Equal Access to Services     [facility]: Hadii aad ku hadasho Soponchoali, waaxda luqadaha, qaybta kaalmada adeegyada, abad santiago . So [facility] [phone].    ATENCIÓN: Si serala rheaañol, tiene a ibrahim disposición servicios gratuitos de asistencia lingüística. Llame al [phone].    We comply with applicable federal civil rights laws and [place] laws. We do not discriminate on the basis of race, color, national origin, age, disability, sex, sexual orientation, or gender identity.            Thank you!     Thank you for choosing [facility]  for your care. Our goal is always to provide you with excellent care. Hearing back from our patients is one way we can continue to improve our services. Please take a few minutes to complete the written survey that you may receive in the mail after your visit with us. Thank you!             Your Updated Medication List - Protect others around you: Learn how to safely use, store and throw away your medicines at www.disposemymeds.org.          This list is accurate as of 3/20/18  1:39 PM.  Always use your most recent med list.                   Brand Name Dispense Instructions for use Diagnosis    folic acid 1 MG tablet    FOLVITE    100 tablet    " Take 1 tablet (1 mg) by mouth daily    Encounter for gynecological examination without abnormal finding       IRON SUPPLEMENT PO           Multi-vitamin Tabs tablet      Take 1 tablet by mouth daily

## 2018-03-21 ASSESSMENT — PATIENT HEALTH QUESTIONNAIRE - PHQ9: SUM OF ALL RESPONSES TO PHQ QUESTIONS 1-9: 6

## 2018-03-21 ASSESSMENT — ANXIETY QUESTIONNAIRES: GAD7 TOTAL SCORE: 0

## 2018-04-09 DIAGNOSIS — Z01.419 ENCOUNTER FOR GYNECOLOGICAL EXAMINATION WITHOUT ABNORMAL FINDING: ICD-10-CM

## 2018-04-09 RX ORDER — FOLIC ACID 1 MG/1
1 TABLET ORAL DAILY
Qty: 100 TABLET | Refills: 3 | OUTPATIENT
Start: 2018-04-09

## 2018-04-17 ENCOUNTER — OFFICE VISIT (OUTPATIENT)
Dept: OBGYN | Facility: CLINIC | Age: 43
End: 2018-04-17
Payer: COMMERCIAL

## 2018-04-17 ENCOUNTER — RADIANT APPOINTMENT (OUTPATIENT)
Dept: MAMMOGRAPHY | Facility: CLINIC | Age: 43
End: 2018-04-17
Payer: COMMERCIAL

## 2018-04-17 VITALS
SYSTOLIC BLOOD PRESSURE: 112 MMHG | HEIGHT: 63 IN | DIASTOLIC BLOOD PRESSURE: 72 MMHG | WEIGHT: 162 LBS | BODY MASS INDEX: 28.7 KG/M2

## 2018-04-17 DIAGNOSIS — Z13.220 ENCOUNTER FOR LIPID SCREENING FOR CARDIOVASCULAR DISEASE: ICD-10-CM

## 2018-04-17 DIAGNOSIS — Z13.6 ENCOUNTER FOR LIPID SCREENING FOR CARDIOVASCULAR DISEASE: ICD-10-CM

## 2018-04-17 DIAGNOSIS — Z13.1 SCREENING FOR DIABETES MELLITUS: ICD-10-CM

## 2018-04-17 DIAGNOSIS — E61.1 LOW IRON: ICD-10-CM

## 2018-04-17 DIAGNOSIS — Z13.29 SCREENING FOR THYROID DISORDER: ICD-10-CM

## 2018-04-17 DIAGNOSIS — Z01.419 ENCOUNTER FOR GYNECOLOGICAL EXAMINATION WITHOUT ABNORMAL FINDING: Primary | ICD-10-CM

## 2018-04-17 DIAGNOSIS — Z12.31 VISIT FOR SCREENING MAMMOGRAM: ICD-10-CM

## 2018-04-17 LAB
GLUCOSE BLD-MCNC: 86 MG/DL (ref 70–99)
HGB BLD-MCNC: 12 G/DL (ref 11.7–15.7)

## 2018-04-17 PROCEDURE — 99396 PREV VISIT EST AGE 40-64: CPT | Performed by: OBSTETRICS & GYNECOLOGY

## 2018-04-17 PROCEDURE — 77067 SCR MAMMO BI INCL CAD: CPT | Mod: TC

## 2018-04-17 PROCEDURE — 36415 COLL VENOUS BLD VENIPUNCTURE: CPT | Performed by: OBSTETRICS & GYNECOLOGY

## 2018-04-17 PROCEDURE — 84443 ASSAY THYROID STIM HORMONE: CPT | Performed by: OBSTETRICS & GYNECOLOGY

## 2018-04-17 PROCEDURE — 82947 ASSAY GLUCOSE BLOOD QUANT: CPT | Performed by: OBSTETRICS & GYNECOLOGY

## 2018-04-17 PROCEDURE — 85018 HEMOGLOBIN: CPT | Performed by: OBSTETRICS & GYNECOLOGY

## 2018-04-17 PROCEDURE — 80061 LIPID PANEL: CPT | Performed by: OBSTETRICS & GYNECOLOGY

## 2018-04-17 RX ORDER — FOLIC ACID 1 MG/1
1 TABLET ORAL DAILY
Qty: 100 TABLET | Refills: 3 | Status: SHIPPED | OUTPATIENT
Start: 2018-04-17 | End: 2019-09-04

## 2018-04-17 NOTE — PROGRESS NOTES
Elizabeth is a 42 year old  female who presents for annual exam.     Besides routine health maintenance, patient is fasting for labs today. And believe has never been tested for herpes and would like that test.    HPI:  Here for annual exam. Would like fasting labs as she is fasting. Also presents for mammogram as well.   The patient does not have a primary care provider.      GYNECOLOGIC HISTORY:    Patient's last menstrual period was 2018.  Her current contraception method is: none.  She  reports that she has never smoked. She has never used smokeless tobacco.  Patient is sexually active.  STD testing offered?  Accepted       Last PHQ-9 score on record =   PHQ-9 SCORE 3/20/2018   Total Score 6     Last GAD7 score on record =   MILEY-7 SCORE 3/20/2018   Total Score 0     Alcohol Score = 3    HEALTH MAINTENANCE:  Cholesterol:   Cholesterol   Date Value Ref Range Status   2017 169 <200 mg/dL Final   Last Mammo: 3/16/17, Result: normal, Next Mammo: today   Pap:   Lab Results   Component Value Date    PAP NIL 2017   Colonoscopy:  never, Result: not applicable, Next Colonoscopy: due age 50  Dexa:  Never    Health maintenance updated:  yes    HISTORY:  Obstetric History       T1      L1     SAB0   TAB3   Ectopic0   Multiple0   Live Births1       # Outcome Date GA Lbr Rosales/2nd Weight Sex Delivery Anes PTL Lv   4 Term 03    M -SEC   CHRISTINE   3 TAB            2 TAB            1 TAB                   Patient Active Problem List   Diagnosis     Intramural leiomyoma of uterus     Past Surgical History:   Procedure Laterality Date      SECTION  2003     GYN SURGERY  10/2014, 2002    Abdominal Myomectomies x 2      Social History   Substance Use Topics     Smoking status: Never Smoker     Smokeless tobacco: Never Used     Alcohol use 0.0 oz/week     0 Standard drinks or equivalent per week      Comment: rare      Problem (# of Occurrences) Relation (Name,Age of  "Onset)    DIABETES (1) Maternal Grandmother    Hyperlipidemia (2) Mother, Brother    Hypertension (2) Mother, Father            Current Outpatient Prescriptions   Medication Sig     Cholecalciferol (VITAMIN D PO)      VITAMIN E PO      vitamin B complex with vitamin C (VITAMIN  B COMPLEX) TABS tablet Take 1 tablet by mouth daily     Ferrous Sulfate (IRON SUPPLEMENT PO)      multivitamin, therapeutic with minerals (MULTI-VITAMIN) TABS tablet Take 1 tablet by mouth daily     folic acid (FOLVITE) 1 MG tablet Take 1 tablet (1 mg) by mouth daily (Patient not taking: Reported on 4/17/2018)     No current facility-administered medications for this visit.      No Known Allergies    Past medical, surgical, social and family histories were reviewed and updated in EPIC.    ROS:   12 point review of systems negative other than symptoms noted below.    EXAM:  /72  Ht 5' 3\" (1.6 m)  Wt 162 lb (73.5 kg)  LMP 04/09/2018  BMI 28.7 kg/m2   BMI: Body mass index is 28.7 kg/(m^2).    PHYSICAL EXAM:  Constitutional:  Appearance: Well nourished, well developed, alert, in no acute distress  Neck:  Lymph Nodes:  No lymphadenopathy present    Thyroid:  Gland size normal, nontender, no nodules or masses present on palpation  Chest:  Respiratory Effort:  Breathing unlabored, CTAB  Cardiovascular:    Heart: Auscultation:  Regular rate, normal rhythm, no murmurs present  Breasts: Palpation of Breasts and Axillae:  No masses present on palpation, no breast tenderness., Axillary Lymph Nodes:  No lymphadenopathy present., No nodularity, asymmetry or nipple discharge bilaterally. and no skin changes noted  Gastrointestinal:   Abdominal Examination:  Abdomen nontender to palpation, tone normal without rigidity or guarding, no masses present, umbilicus without lesions   Liver and Spleen:  No hepatomegaly present, liver nontender to palpation    Hernias:  No hernias present  Lymphatic: Lymph Nodes:  No other lymphadenopathy " present  Skin:  General Inspection:  No rashes present, no lesions present, no areas of  discoloration    Genitalia and Groin:  No rashes present, no lesions present, no areas of  discoloration, no masses present  Neurologic/Psychiatric:    Mental Status:  Oriented X3     Pelvic Exam:  External Genitalia:     Normal appearance for age, no discharge present, no tenderness present, no inflammatory lesions present, color normal  Vagina:     Normal vaginal vault without central or paravaginal defects, no discharge present, no inflammatory lesions present, no masses present  Bladder:     Nontender to palpation  Urethra:   Urethral Body:  Urethra palpation normal, urethra structural support normal   Urethral Meatus:  No erythema or lesions present  Cervix:     Appearance healthy, no lesions present, nontender to palpation, no bleeding present  Uterus:     Uterus: firm, normal sized and nontender, anteverted in position.   Adnexa:     No adnexal tenderness present, no adnexal masses present  Perineum:     Perineum within normal limits, no evidence of trauma, no rashes or skin lesions present  Pubic Hair:     Normal pubic hair distribution for age  Genitalia and Groin:     No rashes present, no lesions present, no areas of discoloration, no masses present      COUNSELING:   Reviewed preventive health counseling, as reflected in patient instructions       Regular exercise    BMI: Body mass index is 28.7 kg/(m^2).    ASSESSMENT:  42 year old female with satisfactory annual exam.    ICD-10-CM    1. Encounter for gynecological examination without abnormal finding Z01.419        PLAN:  Pap smear was not indicated today  Fasting labs ordered for patient today with repeat TSH  Mammogram today as well.  Will follow up with the results.    Laura Mixon MD

## 2018-04-17 NOTE — MR AVS SNAPSHOT
"              After Visit Summary   4/17/2018    Elizabeth Jerome    MRN: [id number]           Patient Information     Date Of Birth          1975        Visit Information        Provider Department      4/17/2018 3:00 PM Laura Mixon MD [facility]        Today's Diagnoses     Encounter for gynecological examination without abnormal finding    -  1    Encounter for lipid screening for cardiovascular disease        Screening for thyroid disorder        Screening for diabetes mellitus        Low iron           Follow-ups after your visit        Who to contact     If you have questions or need follow up information about today's clinic visit or your schedule please contact [facility] directly at [phone].  Normal or non-critical lab and imaging results will be communicated to you by MyChart, letter or phone within 4 business days after the clinic has received the results. If you do not hear from us within 7 days, please contact the clinic through MyChart or phone. If you have a critical or abnormal lab result, we will notify you by phone as soon as possible.  Submit refill requests through [employer] or call your pharmacy and they will forward the refill request to us. Please allow 3 business days for your refill to be completed.          Additional Information About Your Visit        MyChart Information     [employer] lets you send messages to your doctor, view your test results, renew your prescriptions, schedule appointments and more. To sign up, go to [email or web address] . Click on \"Log in\" on the left side of the screen, which will take you to the Welcome page. Then click on \"Sign up Now\" on the right side of the page.     You will be asked to enter the access code listed below, as well as some personal information. Please follow the directions to create your username and password.     Your access code is: [email or web address]  Expires: 6/18/2018  1:39 PM   " "  Your access code will  in 90 days. If you need help or a new code, please call your [place] clinic or [phone].        Care EveryWhere ID     This is your Care EveryWhere ID. This could be used by other organizations to access your [place] medical records  [id number]        Your Vitals Were     Height Last Period BMI (Body Mass Index)             5' 3\" (1.6 m) 2018 28.7 kg/m2          Blood Pressure from Last 3 Encounters:   18 112/72   18 122/82   17 116/78    Weight from Last 3 Encounters:   18 162 lb (73.5 kg)   18 162 lb (73.5 kg)   17 161 lb 4.8 oz (73.2 kg)              We Performed the Following     Glucose, whole blood     Hemoglobin     Lipid panel reflex to direct LDL Fasting     TSH with free T4 reflex     TSH        Primary Care Provider    None Specified       No primary provider on file.        Equal Access to Services     SAMANTHA SHELL : Elizabeth Martinez, roque guevara, raúl cornellalkarthik wei, abad santiago . So [facility] [phone].    ATENCIÓN: Si habla español, tiene a ibrahim disposición servicios gratuitos de asistencia lingüística. Llame al [phone].    We comply with applicable federal civil rights laws and [place] laws. We do not discriminate on the basis of race, color, national origin, age, disability, sex, sexual orientation, or gender identity.            Thank you!     Thank you for choosing [facility] [place]  for your care. Our goal is always to provide you with excellent care. Hearing back from our patients is one way we can continue to improve our services. Please take a few minutes to complete the written survey that you may receive in the mail after your visit with us. Thank you!             Your Updated Medication List - Protect others around you: Learn how to safely use, store and throw away your medicines at www.disposemymeds.org.          This list is accurate as of " 4/17/18  4:15 PM.  Always use your most recent med list.                   Brand Name Dispense Instructions for use Diagnosis    folic acid 1 MG tablet    FOLVITE    100 tablet    Take 1 tablet (1 mg) by mouth daily    Encounter for gynecological examination without abnormal finding       IRON SUPPLEMENT PO           Multi-vitamin Tabs tablet      Take 1 tablet by mouth daily        vitamin B complex with vitamin C Tabs tablet      Take 1 tablet by mouth daily        VITAMIN D PO           VITAMIN E PO

## 2018-04-19 LAB
CHOLEST SERPL-MCNC: 187 MG/DL
HDLC SERPL-MCNC: 61 MG/DL
LDLC SERPL CALC-MCNC: 116 MG/DL
NONHDLC SERPL-MCNC: 126 MG/DL
TRIGL SERPL-MCNC: 52 MG/DL
TSH SERPL DL<=0.005 MIU/L-ACNC: 1.82 MU/L (ref 0.4–4)

## 2018-10-25 ENCOUNTER — OFFICE VISIT (OUTPATIENT)
Dept: OBGYN | Facility: CLINIC | Age: 43
End: 2018-10-25
Payer: COMMERCIAL

## 2018-10-25 VITALS
SYSTOLIC BLOOD PRESSURE: 108 MMHG | DIASTOLIC BLOOD PRESSURE: 64 MMHG | WEIGHT: 166 LBS | BODY MASS INDEX: 29.41 KG/M2 | HEIGHT: 63 IN | HEART RATE: 70 BPM

## 2018-10-25 DIAGNOSIS — K64.4 EXTERNAL HEMORRHOIDS: ICD-10-CM

## 2018-10-25 DIAGNOSIS — L98.9 SKIN LESION: Primary | ICD-10-CM

## 2018-10-25 PROCEDURE — 99213 OFFICE O/P EST LOW 20 MIN: CPT | Performed by: OBSTETRICS & GYNECOLOGY

## 2018-10-25 NOTE — PROGRESS NOTES
SUBJECTIVE:                                                   Elizabeth Jerome is a 43 year old female who presents to clinic today for the following health issue(s):  Patient presents with:  Breast Pain: c/o lump on left side under breast, more on side since , changes in size. There was pain at a point.        HPI:  Elizabeth presents with a new skin lesion since this summer. It first occurred while she was away in [facility] and she thought it was a mosquito bite. It subsequently got bigger and since she made the appointment it has regressed. The skin lesion in on her left chest wall just inferior to her breast. There is no discharge or skin weeping. And it was initially painful but now isn't. She has also noted a rectal growth that seems to bleed at the base with hard bowel movements. Her bowel movements are hard and she endorses a lot of straining. She has not tried any topical remedies because she is uncertain as to what it is.     Patient's last menstrual period was 10/05/2018 (exact date)..   Patient is sexually active, .  Using none for contraception.    reports that she has never smoked. She has never used smokeless tobacco.    STD testing offered?  Declined    Health maintenance updated:  yes    Problem list and histories reviewed & adjusted, as indicated.  Additional history: as documented.    Patient Active Problem List   Diagnosis     Intramural leiomyoma of uterus     Past Surgical History:   Procedure Laterality Date      SECTION  2003     GYN SURGERY  10/2014, 2002    Abdominal Myomectomies x 2      Social History   Substance Use Topics     Smoking status: Never Smoker     Smokeless tobacco: Never Used     Alcohol use 0.0 oz/week     0 Standard drinks or equivalent per week      Comment: rare      Problem (# of Occurrences) Relation (Name,Age of Onset)    Diabetes (1) Maternal Grandmother    Hyperlipidemia (2) Mother, Brother    Hypertension (2) Mother, Father         "    Current Outpatient Prescriptions   Medication Sig     Cholecalciferol (VITAMIN D PO)      Ferrous Sulfate (IRON SUPPLEMENT PO)      folic acid (FOLVITE) 1 MG tablet Take 1 tablet (1 mg) by mouth daily     multivitamin, therapeutic with minerals (MULTI-VITAMIN) TABS tablet Take 1 tablet by mouth daily     vitamin B complex with vitamin C (VITAMIN  B COMPLEX) TABS tablet Take 1 tablet by mouth daily     VITAMIN E PO      No current facility-administered medications for this visit.      No Known Allergies    ROS:  12 point review of systems negative other than symptoms noted below.  Constitutional: Weight Gain  Breast: Lumps  Gastrointestinal: Bloating  Musculoskeletal: Joint Pain    OBJECTIVE:     /64  Pulse 70  Ht 5' 3\" (1.6 m)  Wt 166 lb (75.3 kg)  LMP 10/05/2018 (Exact Date)  BMI 29.41 kg/m2  Body mass index is 29.41 kg/(m^2).    Exam:  Constitutional:  Appearance: Well nourished, well developed alert, in no acute distress  Chest:  Respiratory Effort:  Breathing unlabored, at the area of concern is a single hyperpigmented flat area in the skin.  Breasts:  Inspection of Breasts:  No visible skin changes of the actual breasts; Palpation of Breasts and Axillae:  No masses present on palpation, no breast tenderness Axillary Lymph Nodes:  No lymphadenopathy present  Neurologic/Psychiatric:  Mental Status:  Oriented X3    Rectal: single external hemorrhoid. Non-thrombosed.    In-Clinic Test Results:  No results found for this or any previous visit (from the past 24 hour(s)).    ASSESSMENT/PLAN:                                                        ICD-10-CM    1. Skin lesion L98.9    2. External hemorrhoids K64.4      The skin lesion appears to be consistent with a healed mosquito bite. She was reassured about this. There is no involvement of the breast.    She was counseled that she has an external hemorrhoid and should use topical witch hazel for comfort and preparation H. She was asked to increase her " water intake to 2 liters per day. Start Miralax daily. She was also counseled not to strain with bowel movements.    Laura Mixon MD  [facility]

## 2018-10-25 NOTE — MR AVS SNAPSHOT
"              After Visit Summary   10/25/2018    Elizabeth Jerome    MRN: [id number]           Patient Information     Date Of Birth          1975        Visit Information        Provider Department      10/25/2018 11:30 AM Laura Mixon MD [facility] [place]        Today's Diagnoses     Skin lesion    -  1    External hemorrhoids           Follow-ups after your visit        Who to contact     If you have questions or need follow up information about today's clinic visit or your schedule please contact [facility] [place] directly at [phone].  Normal or non-critical lab and imaging results will be communicated to you by Aircarehart, letter or phone within 4 business days after the clinic has received the results. If you do not hear from us within 7 days, please contact the clinic through Aircarehart or phone. If you have a critical or abnormal lab result, we will notify you by phone as soon as possible.  Submit refill requests through Zubka or call your pharmacy and they will forward the refill request to us. Please allow 3 business days for your refill to be completed.          Additional Information About Your Visit        MyChart Information     Zubka lets you send messages to your doctor, view your test results, renew your prescriptions, schedule appointments and more. To sign up, go to [email or web address] . Click on \"Log in\" on the left side of the screen, which will take you to the Welcome page. Then click on \"Sign up Now\" on the right side of the page.     You will be asked to enter the access code listed below, as well as some personal information. Please follow the directions to create your username and password.     Your access code is: [email or web address]  Expires: 2019 12:42 PM     Your access code will  in 90 days. If you need help or a new code, please call your [facility] clinic or [phone].        Care EveryWhere ID     This is your Care " "EveryWhere ID. This could be used by other organizations to access your [facility] medical records  [id number]        Your Vitals Were     Pulse Height Last Period BMI (Body Mass Index)          70 5' 3\" (1.6 m) 10/05/2018 (Exact Date) 29.41 kg/m2         Blood Pressure from Last 3 Encounters:   10/25/18 108/64   04/17/18 112/72   03/20/18 122/82    Weight from Last 3 Encounters:   10/25/18 166 lb (75.3 kg)   04/17/18 162 lb (73.5 kg)   03/20/18 162 lb (73.5 kg)              Today, you had the following     No orders found for display       Primary Care Provider    None Specified       No primary provider on file.        Equal Access to Services     SAMANTHA SHELL : Elizabeth Martinez, roque guevara, raúl kaalmatony wei, abad santiago . So [facility] [phone].    ATENCIÓN: Si habla español, tiene a ibrahim disposición servicios gratuitos de asistencia lingüística. Llame al [phone].    We comply with applicable federal civil rights laws and [place] laws. We do not discriminate on the basis of race, color, national origin, age, disability, sex, sexual orientation, or gender identity.            Thank you!     Thank you for choosing [facility] [place]  for your care. Our goal is always to provide you with excellent care. Hearing back from our patients is one way we can continue to improve our services. Please take a few minutes to complete the written survey that you may receive in the mail after your visit with us. Thank you!             Your Updated Medication List - Protect others around you: Learn how to safely use, store and throw away your medicines at www.disposemymeds.org.          This list is accurate as of 10/25/18 12:42 PM.  Always use your most recent med list.                   Brand Name Dispense Instructions for use Diagnosis    folic acid 1 MG tablet    FOLVITE    100 tablet    Take 1 tablet (1 mg) by mouth daily    Encounter for gynecological " examination without abnormal finding       IRON SUPPLEMENT PO           Multi-vitamin Tabs tablet      Take 1 tablet by mouth daily        vitamin B complex with vitamin C Tabs tablet      Take 1 tablet by mouth daily        VITAMIN D PO           VITAMIN E PO

## 2018-11-08 ENCOUNTER — TELEPHONE (OUTPATIENT)
Dept: OBGYN | Facility: CLINIC | Age: 43
End: 2018-11-08

## 2018-11-08 NOTE — TELEPHONE ENCOUNTER
Called the pt. She was seen at [facility] yesterday. Her Bp was 120/96. She was told to have her BP monitored at her PCP office to see if it needs to be treated.     Will consult Dr Mixon if she wants an appt to see the pt   Or okay to have a nurse visit only.    Routing to Dr Mixon.

## 2018-11-08 NOTE — TELEPHONE ENCOUNTER
Patient saw [facility] yesterday and she is saying they told  her to check her BP here. Please call her today to see why we are supposed to schedule this?

## 2018-11-08 NOTE — TELEPHONE ENCOUNTER
This can be a nurse visit. We can check it, if it is elevated. Consult with me. And if it isnt we can have her return in 3 months for a recheck.

## 2018-11-08 NOTE — TELEPHONE ENCOUNTER
Called the pt- Explained that the clinic will contact her to schedule a nurse only visit for her BP check. At the time of the visit we will consult Dr Pop if any treatment is needed. No further questions.     See Dr Mixon note below for BP visit instructions

## 2018-11-09 ENCOUNTER — TRANSFERRED RECORDS (OUTPATIENT)
Dept: HEALTH INFORMATION MANAGEMENT | Facility: CLINIC | Age: 43
End: 2018-11-09

## 2018-11-09 ENCOUNTER — ALLIED HEALTH/NURSE VISIT (OUTPATIENT)
Dept: NURSING | Facility: CLINIC | Age: 43
End: 2018-11-09
Payer: COMMERCIAL

## 2018-11-09 ENCOUNTER — TELEPHONE (OUTPATIENT)
Dept: NURSING | Facility: CLINIC | Age: 43
End: 2018-11-09

## 2018-11-09 VITALS — SYSTOLIC BLOOD PRESSURE: 120 MMHG | HEART RATE: 69 BPM | DIASTOLIC BLOOD PRESSURE: 78 MMHG

## 2018-11-09 DIAGNOSIS — D25.9 UTERINE LEIOMYOMA, UNSPECIFIED LOCATION: Primary | ICD-10-CM

## 2018-11-09 DIAGNOSIS — Z13.6 SCREENING FOR HYPERTENSION: Primary | ICD-10-CM

## 2018-11-09 PROCEDURE — 99207 ZZC NO BILLABLE SERVICE THIS VISIT: CPT

## 2018-11-09 NOTE — NURSING NOTE
Pt states she was at the [facility] 11/7/18 and her BP was 120/96. Today BP's are 123/82 automatic 120/78 manually. Printed out pt's BP's from today to bring to her appt today at the [facility].

## 2018-11-09 NOTE — MR AVS SNAPSHOT
"              After Visit Summary   2018    Elizabeth Jerome    MRN: [id number]           Patient Information     Date Of Birth          1975        Visit Information        Provider Department      2018 8:00 AM [facility] [place]        Today's Diagnoses     Screening for hypertension    -  1       Follow-ups after your visit        Who to contact     If you have questions or need follow up information about today's clinic visit or your schedule please contact [facility] [place] directly at [phone].  Normal or non-critical lab and imaging results will be communicated to you by MyChart, letter or phone within 4 business days after the clinic has received the results. If you do not hear from us within 7 days, please contact the clinic through Jobyalhart or phone. If you have a critical or abnormal lab result, we will notify you by phone as soon as possible.  Submit refill requests through Xyleme or call your pharmacy and they will forward the refill request to us. Please allow 3 business days for your refill to be completed.          Additional Information About Your Visit        MyChart Information     Xyleme lets you send messages to your doctor, view your test results, renew your prescriptions, schedule appointments and more. To sign up, go to [email or web address] . Click on \"Log in\" on the left side of the screen, which will take you to the Welcome page. Then click on \"Sign up Now\" on the right side of the page.     You will be asked to enter the access code listed below, as well as some personal information. Please follow the directions to create your username and password.     Your access code is: [email or web address]  Expires: 2019 11:42 AM     Your access code will  in 90 days. If you need help or a new code, please call your [facility] clinic or [phone].        Care EveryWhere ID     This is your Care EveryWhere ID. This could be used by other " organizations to access your [facility] medical records  [id number]        Your Vitals Were     Pulse                   69            Blood Pressure from Last 3 Encounters:   11/09/18 120/78   10/25/18 108/64   04/17/18 112/72    Weight from Last 3 Encounters:   10/25/18 166 lb (75.3 kg)   04/17/18 162 lb (73.5 kg)   03/20/18 162 lb (73.5 kg)              Today, you had the following     No orders found for display       Primary Care Provider Office Phone # Fax #    [facility] [phone]       [facility] [address] [facility] [address]  [facility] [address]        Equal Access to Services     SAMANTHA SHELL : Hadii farzana calleso Socole, waaxda luqadaha, qaybta kaalmada adeegyada, abad hickey. So [facility] [phone].    ATENCIÓN: Si habla español, tiene a ibrahim disposición servicios gratuitos de asistencia lingüística. Llame al [phone].    We comply with applicable federal civil rights laws and [place] laws. We do not discriminate on the basis of race, color, national origin, age, disability, sex, sexual orientation, or gender identity.            Thank you!     Thank you for choosing [facility]  for your care. Our goal is always to provide you with excellent care. Hearing back from our patients is one way we can continue to improve our services. Please take a few minutes to complete the written survey that you may receive in the mail after your visit with us. Thank you!             Your Updated Medication List - Protect others around you: Learn how to safely use, store and throw away your medicines at www.disposemymeds.org.          This list is accurate as of 11/9/18  8:19 AM.  Always use your most recent med list.                   Brand Name Dispense Instructions for use Diagnosis    folic acid 1 MG tablet    FOLVITE    100 tablet    Take 1 tablet (1 mg) by mouth daily    Encounter for gynecological  examination without abnormal finding       IRON SUPPLEMENT PO           Multi-vitamin Tabs tablet      Take 1 tablet by mouth daily        vitamin B complex with vitamin C Tabs tablet      Take 1 tablet by mouth daily        VITAMIN D PO           VITAMIN E PO

## 2018-11-12 ENCOUNTER — TELEPHONE (OUTPATIENT)
Dept: OBGYN | Facility: CLINIC | Age: 43
End: 2018-11-12

## 2018-11-12 NOTE — TELEPHONE ENCOUNTER
Patient states she wants to do ASAP and will make it work with her schedule.    Type of surgery: Operative Hysteroscopy with Myomectomy with  Myosure  Location of surgery: [facility]  Date and time of surgery: 11/28/18 1:35  Surgeon: Oral  Pre-Op Appt Date: will be done at Hospital day of per ME  Post-Op Appt Date: 2 weeks   Packet sent out: Yes mailed 11/12/18  Pre-cert/Authorization completed:    Date: 11/12/18 [facility]    Requested move up

## 2018-11-12 NOTE — TELEPHONE ENCOUNTER
Order Questions      Question Answer Comment     Procedure name(s) - multi select Operative Hysteroscopy with Myomectomy with  Myosure      Is this a multi surgeon case? No      Laterality N/A      Reason for procedure Fibroids      Urgency of Surgery Patient Choice/Next Available      Location of Case: [facility]      Surgeon Procedure Time (incision to closure) in minutes (per procedure as applicable) 45      Note:  Surgical Case Time Needed (in minutes)     Patient Class (for admit prior to surgery, specify number of days in comments): Same day (surgery center outpatient)      Anesthesia General      H&P To Be Completed By: Surgeon       needed? No      Post-Op Appointment 2 weeks      Procedure will be performed or supervised by: Laura Mixon MD      Vendor Needed? No

## 2018-11-12 NOTE — TELEPHONE ENCOUNTER
Dr FONTENOT called back but he was indisposed. Will order hysterocopy and myomectomy. Will await call back to see if any additional procedures are recommended because the fibroid seemed to be intramural.

## 2018-11-15 NOTE — TELEPHONE ENCOUNTER
Spoke to pt and she had concerns for upcoming surgery. Took down concerns, spoke with Dr Mixon and relayed information back to the patient as follows.    Time off Work? Should be able to return to work on the following Monday 12/3  Possiblity of having period at time of procedure? Ok if that is the case.    Pt expressed understanding    Ginger Guerrero  Surgery Scheduler

## 2018-11-28 ENCOUNTER — ANESTHESIA EVENT (OUTPATIENT)
Dept: SURGERY | Facility: CLINIC | Age: 43
End: 2018-11-28
Payer: COMMERCIAL

## 2018-11-28 ENCOUNTER — HOSPITAL ENCOUNTER (OUTPATIENT)
Facility: CLINIC | Age: 43
Discharge: HOME | End: 2018-11-28
Attending: OBSTETRICS & GYNECOLOGY | Admitting: OBSTETRICS & GYNECOLOGY
Payer: COMMERCIAL

## 2018-11-28 ENCOUNTER — ANESTHESIA (OUTPATIENT)
Dept: SURGERY | Facility: CLINIC | Age: 43
End: 2018-11-28
Payer: COMMERCIAL

## 2018-11-28 ENCOUNTER — SURGERY (OUTPATIENT)
Age: 43
End: 2018-11-28

## 2018-11-28 VITALS
TEMPERATURE: 97 F | RESPIRATION RATE: 17 BRPM | OXYGEN SATURATION: 100 % | HEIGHT: 64 IN | BODY MASS INDEX: 28 KG/M2 | DIASTOLIC BLOOD PRESSURE: 82 MMHG | SYSTOLIC BLOOD PRESSURE: 122 MMHG | WEIGHT: 164 LBS

## 2018-11-28 DIAGNOSIS — D25.1 INTRAMURAL LEIOMYOMA OF UTERUS: Primary | ICD-10-CM

## 2018-11-28 LAB — B-HCG SERPL-ACNC: <1 IU/L (ref 0–5)

## 2018-11-28 PROCEDURE — 27210794 ZZH OR GENERAL SUPPLY STERILE: Performed by: OBSTETRICS & GYNECOLOGY

## 2018-11-28 PROCEDURE — 71000012 ZZH RECOVERY PHASE 1 LEVEL 1 FIRST HR: Performed by: OBSTETRICS & GYNECOLOGY

## 2018-11-28 PROCEDURE — 25000132 ZZH RX MED GY IP 250 OP 250 PS 637: Performed by: ANESTHESIOLOGY

## 2018-11-28 PROCEDURE — 37000009 ZZH ANESTHESIA TECHNICAL FEE, EACH ADDTL 15 MIN: Performed by: OBSTETRICS & GYNECOLOGY

## 2018-11-28 PROCEDURE — 25800025 ZZH RX 258: Performed by: OBSTETRICS & GYNECOLOGY

## 2018-11-28 PROCEDURE — 25000128 H RX IP 250 OP 636: Performed by: OBSTETRICS & GYNECOLOGY

## 2018-11-28 PROCEDURE — 25000125 ZZHC RX 250: Performed by: NURSE ANESTHETIST, CERTIFIED REGISTERED

## 2018-11-28 PROCEDURE — 37000008 ZZH ANESTHESIA TECHNICAL FEE, 1ST 30 MIN: Performed by: OBSTETRICS & GYNECOLOGY

## 2018-11-28 PROCEDURE — 84702 CHORIONIC GONADOTROPIN TEST: CPT | Performed by: OBSTETRICS & GYNECOLOGY

## 2018-11-28 PROCEDURE — 36415 COLL VENOUS BLD VENIPUNCTURE: CPT | Performed by: OBSTETRICS & GYNECOLOGY

## 2018-11-28 PROCEDURE — 25000128 H RX IP 250 OP 636: Performed by: NURSE ANESTHETIST, CERTIFIED REGISTERED

## 2018-11-28 PROCEDURE — 58558 HYSTEROSCOPY BIOPSY: CPT | Performed by: OBSTETRICS & GYNECOLOGY

## 2018-11-28 PROCEDURE — 88305 TISSUE EXAM BY PATHOLOGIST: CPT | Performed by: OBSTETRICS & GYNECOLOGY

## 2018-11-28 PROCEDURE — 71000027 ZZH RECOVERY PHASE 2 EACH 15 MINS: Performed by: OBSTETRICS & GYNECOLOGY

## 2018-11-28 PROCEDURE — 25000132 ZZH RX MED GY IP 250 OP 250 PS 637: Performed by: OBSTETRICS & GYNECOLOGY

## 2018-11-28 PROCEDURE — 25000125 ZZHC RX 250: Performed by: OBSTETRICS & GYNECOLOGY

## 2018-11-28 PROCEDURE — 36000058 ZZH SURGERY LEVEL 3 EA 15 ADDTL MIN: Performed by: OBSTETRICS & GYNECOLOGY

## 2018-11-28 PROCEDURE — 40000170 ZZH STATISTIC PRE-PROCEDURE ASSESSMENT II: Performed by: OBSTETRICS & GYNECOLOGY

## 2018-11-28 PROCEDURE — 71000013 ZZH RECOVERY PHASE 1 LEVEL 1 EA ADDTL HR: Performed by: OBSTETRICS & GYNECOLOGY

## 2018-11-28 PROCEDURE — 88305 TISSUE EXAM BY PATHOLOGIST: CPT | Mod: 26 | Performed by: OBSTETRICS & GYNECOLOGY

## 2018-11-28 PROCEDURE — 36000056 ZZH SURGERY LEVEL 3 1ST 30 MIN: Performed by: OBSTETRICS & GYNECOLOGY

## 2018-11-28 RX ORDER — HYDROXYZINE HYDROCHLORIDE 50 MG/1
50 TABLET, FILM COATED ORAL ONCE
Status: DISCONTINUED | OUTPATIENT
Start: 2018-11-28 | End: 2018-11-28

## 2018-11-28 RX ORDER — ACETAMINOPHEN 325 MG/1
975 TABLET ORAL ONCE
Status: COMPLETED | OUTPATIENT
Start: 2018-11-28 | End: 2018-11-28

## 2018-11-28 RX ORDER — LIDOCAINE HYDROCHLORIDE 20 MG/ML
INJECTION, SOLUTION INFILTRATION; PERINEURAL PRN
Status: DISCONTINUED | OUTPATIENT
Start: 2018-11-28 | End: 2018-11-28

## 2018-11-28 RX ORDER — OXYCODONE HYDROCHLORIDE 5 MG/1
5 TABLET ORAL
Status: CANCELLED | OUTPATIENT
Start: 2018-11-28

## 2018-11-28 RX ORDER — IBUPROFEN 600 MG/1
600 TABLET, FILM COATED ORAL EVERY 6 HOURS PRN
Qty: 60 TABLET | Refills: 0 | Status: SHIPPED | OUTPATIENT
Start: 2018-11-28 | End: 2019-08-06

## 2018-11-28 RX ORDER — ONDANSETRON 2 MG/ML
4 INJECTION INTRAMUSCULAR; INTRAVENOUS EVERY 30 MIN PRN
Status: DISCONTINUED | OUTPATIENT
Start: 2018-11-28 | End: 2018-11-28 | Stop reason: HOSPADM

## 2018-11-28 RX ORDER — FENTANYL CITRATE 50 UG/ML
25-50 INJECTION, SOLUTION INTRAMUSCULAR; INTRAVENOUS
Status: CANCELLED | OUTPATIENT
Start: 2018-11-28

## 2018-11-28 RX ORDER — MAGNESIUM HYDROXIDE 1200 MG/15ML
LIQUID ORAL PRN
Status: DISCONTINUED | OUTPATIENT
Start: 2018-11-28 | End: 2018-11-28 | Stop reason: HOSPADM

## 2018-11-28 RX ORDER — IBUPROFEN 600 MG/1
600 TABLET, FILM COATED ORAL
Status: CANCELLED | OUTPATIENT
Start: 2018-11-28

## 2018-11-28 RX ORDER — CEFAZOLIN SODIUM 1 G/3ML
INJECTION, POWDER, FOR SOLUTION INTRAMUSCULAR; INTRAVENOUS PRN
Status: DISCONTINUED | OUTPATIENT
Start: 2018-11-28 | End: 2018-11-28

## 2018-11-28 RX ORDER — PROPOFOL 10 MG/ML
INJECTION, EMULSION INTRAVENOUS CONTINUOUS PRN
Status: DISCONTINUED | OUTPATIENT
Start: 2018-11-28 | End: 2018-11-28

## 2018-11-28 RX ORDER — ONDANSETRON 4 MG/1
4 TABLET, ORALLY DISINTEGRATING ORAL EVERY 30 MIN PRN
Status: DISCONTINUED | OUTPATIENT
Start: 2018-11-28 | End: 2018-11-28 | Stop reason: HOSPADM

## 2018-11-28 RX ORDER — ONDANSETRON 2 MG/ML
INJECTION INTRAMUSCULAR; INTRAVENOUS PRN
Status: DISCONTINUED | OUTPATIENT
Start: 2018-11-28 | End: 2018-11-28

## 2018-11-28 RX ORDER — PROPOFOL 10 MG/ML
INJECTION, EMULSION INTRAVENOUS PRN
Status: DISCONTINUED | OUTPATIENT
Start: 2018-11-28 | End: 2018-11-28

## 2018-11-28 RX ORDER — HYDROXYZINE HYDROCHLORIDE 25 MG/1
25 TABLET, FILM COATED ORAL ONCE
Status: COMPLETED | OUTPATIENT
Start: 2018-11-28 | End: 2018-11-28

## 2018-11-28 RX ORDER — FENTANYL CITRATE 50 UG/ML
25-50 INJECTION, SOLUTION INTRAMUSCULAR; INTRAVENOUS
Status: DISCONTINUED | OUTPATIENT
Start: 2018-11-28 | End: 2018-11-28 | Stop reason: HOSPADM

## 2018-11-28 RX ORDER — SODIUM CHLORIDE, SODIUM LACTATE, POTASSIUM CHLORIDE, CALCIUM CHLORIDE 600; 310; 30; 20 MG/100ML; MG/100ML; MG/100ML; MG/100ML
INJECTION, SOLUTION INTRAVENOUS CONTINUOUS
Status: DISCONTINUED | OUTPATIENT
Start: 2018-11-28 | End: 2018-11-28 | Stop reason: HOSPADM

## 2018-11-28 RX ORDER — DEXAMETHASONE SODIUM PHOSPHATE 4 MG/ML
INJECTION, SOLUTION INTRA-ARTICULAR; INTRALESIONAL; INTRAMUSCULAR; INTRAVENOUS; SOFT TISSUE PRN
Status: DISCONTINUED | OUTPATIENT
Start: 2018-11-28 | End: 2018-11-28

## 2018-11-28 RX ORDER — SODIUM CHLORIDE, SODIUM LACTATE, POTASSIUM CHLORIDE, CALCIUM CHLORIDE 600; 310; 30; 20 MG/100ML; MG/100ML; MG/100ML; MG/100ML
INJECTION, SOLUTION INTRAVENOUS CONTINUOUS PRN
Status: DISCONTINUED | OUTPATIENT
Start: 2018-11-28 | End: 2018-11-28

## 2018-11-28 RX ORDER — NALOXONE HYDROCHLORIDE 0.4 MG/ML
.1-.4 INJECTION, SOLUTION INTRAMUSCULAR; INTRAVENOUS; SUBCUTANEOUS
Status: DISCONTINUED | OUTPATIENT
Start: 2018-11-28 | End: 2018-11-28 | Stop reason: HOSPADM

## 2018-11-28 RX ORDER — FENTANYL CITRATE 50 UG/ML
INJECTION, SOLUTION INTRAMUSCULAR; INTRAVENOUS PRN
Status: DISCONTINUED | OUTPATIENT
Start: 2018-11-28 | End: 2018-11-28

## 2018-11-28 RX ORDER — HYDROMORPHONE HYDROCHLORIDE 1 MG/ML
.3-.5 INJECTION, SOLUTION INTRAMUSCULAR; INTRAVENOUS; SUBCUTANEOUS EVERY 10 MIN PRN
Status: DISCONTINUED | OUTPATIENT
Start: 2018-11-28 | End: 2018-11-28 | Stop reason: HOSPADM

## 2018-11-28 RX ORDER — MEPERIDINE HYDROCHLORIDE 25 MG/ML
12.5 INJECTION INTRAMUSCULAR; INTRAVENOUS; SUBCUTANEOUS
Status: DISCONTINUED | OUTPATIENT
Start: 2018-11-28 | End: 2018-11-28 | Stop reason: HOSPADM

## 2018-11-28 RX ORDER — KETOROLAC TROMETHAMINE 30 MG/ML
30 INJECTION, SOLUTION INTRAMUSCULAR; INTRAVENOUS ONCE
Status: COMPLETED | OUTPATIENT
Start: 2018-11-28 | End: 2018-11-28

## 2018-11-28 RX ADMIN — DEXAMETHASONE SODIUM PHOSPHATE 4 MG: 4 INJECTION, SOLUTION INTRA-ARTICULAR; INTRALESIONAL; INTRAMUSCULAR; INTRAVENOUS; SOFT TISSUE at 14:08

## 2018-11-28 RX ADMIN — FENTANYL CITRATE 50 MCG: 50 INJECTION, SOLUTION INTRAMUSCULAR; INTRAVENOUS at 13:32

## 2018-11-28 RX ADMIN — FENTANYL CITRATE 25 MCG: 50 INJECTION, SOLUTION INTRAMUSCULAR; INTRAVENOUS at 14:27

## 2018-11-28 RX ADMIN — CEFAZOLIN 2 G: 1 INJECTION, POWDER, FOR SOLUTION INTRAMUSCULAR; INTRAVENOUS at 13:34

## 2018-11-28 RX ADMIN — PROPOFOL 200 MG: 10 INJECTION, EMULSION INTRAVENOUS at 13:33

## 2018-11-28 RX ADMIN — SODIUM CHLORIDE 4523 ML: 900 IRRIGANT IRRIGATION at 14:23

## 2018-11-28 RX ADMIN — LIDOCAINE HYDROCHLORIDE 80 MG: 20 INJECTION, SOLUTION INFILTRATION; PERINEURAL at 13:33

## 2018-11-28 RX ADMIN — SODIUM CHLORIDE, POTASSIUM CHLORIDE, SODIUM LACTATE AND CALCIUM CHLORIDE: 600; 310; 30; 20 INJECTION, SOLUTION INTRAVENOUS at 13:30

## 2018-11-28 RX ADMIN — HYDROXYZINE HYDROCHLORIDE 25 MG: 25 TABLET ORAL at 13:23

## 2018-11-28 RX ADMIN — ONDANSETRON 4 MG: 2 INJECTION INTRAMUSCULAR; INTRAVENOUS at 14:11

## 2018-11-28 RX ADMIN — ACETAMINOPHEN 975 MG: 325 TABLET, FILM COATED ORAL at 13:23

## 2018-11-28 RX ADMIN — SODIUM CHLORIDE 1000 ML: 900 IRRIGANT IRRIGATION at 13:48

## 2018-11-28 RX ADMIN — FENTANYL CITRATE 25 MCG: 50 INJECTION, SOLUTION INTRAMUSCULAR; INTRAVENOUS at 14:02

## 2018-11-28 RX ADMIN — KETOROLAC TROMETHAMINE 30 MG: 30 INJECTION, SOLUTION INTRAMUSCULAR at 15:49

## 2018-11-28 RX ADMIN — PROPOFOL 200 MCG/KG/MIN: 10 INJECTION, EMULSION INTRAVENOUS at 13:33

## 2018-11-28 RX ADMIN — MIDAZOLAM 1 MG: 1 INJECTION INTRAMUSCULAR; INTRAVENOUS at 13:32

## 2018-11-28 NOTE — DISCHARGE INSTRUCTIONS
Same Day Surgery Discharge Instructions for  Sedation and General Anesthesia       It's not unusual to feel dizzy, light-headed or faint for up to 24 hours after surgery or while taking pain medication.  If you have these symptoms: sit for a few minutes before standing and have someone assist you when you get up to walk or use the bathroom.      You should rest and relax for the next 24 hours. We recommend you make arrangements to have an adult stay with you for at least 24 hours after your discharge.  Avoid hazardous and strenuous activity.      DO NOT DRIVE any vehicle or operate mechanical equipment for 24 hours following the end of your surgery.  Even though you may feel normal, your reactions may be affected by the medication you have received.      Do not drink alcoholic beverages for 24 hours following surgery.       Slowly progress to your regular diet as you feel able. It's not unusual to feel nauseated and/or vomit after receiving anesthesia.  If you develop these symptoms, drink clear liquids (apple juice, ginger ale, broth, 7-up, etc. ) until you feel better.  If your nausea and vomiting persists for 24 hours, please notify your surgeon.        All narcotic pain medications, along with inactivity and anesthesia, can cause constipation. Drinking plenty of liquids and increasing fiber intake will help.      For any questions of a medical nature, call your surgeon.      Do not make important decisions for 24 hours.      If you had general anesthesia, you may have a sore throat for a couple of days related to the breathing tube used during surgery.  You may use Cepacol lozenges to help with this discomfort.  If it worsens or if you develop a fever, contact your surgeon.       If you feel your pain is not well managed with the pain medications prescribed by your surgeon, please contact your surgeon's office to let them know so they can address your concerns.       Today you received Toradol, an  antiinflammatory medication similar to Ibuprofen.  You should not take other antiinflammatory medication, such as Ibuprofen, Motrin, Advil, Aleve, Naprosyn, etc until 9:50 PM      HOME CARE FOLLOWING HYSTEROSCOPY    Diet  You have no restrictions on your diet.  During the evening following surgery, drink plenty of fluids and eat a light supper.    Nausea  The anesthesia may produce some nausea.  If you feel nauseated, stay in bed and try drinking fluids such as 7-Up, tea, or soup.    Discomfort  The amount of discomfort you can expect is very unpredictable.  If you have pain that cannot be controlled with Tylenol or with the prescription you may have received, you should notify your physician.  Abdominal cramping or low backache is not uncommon and should not be a cause for concern.  You will be drowsy and weak the day of surgery and possibly the following day.  Fever  A low grade fever (not over 100 F) is usual after this procedure.  Do not hesitate to notify your physician if your fever seems excessive or persists.    Activity   You may resume your normal activity.  Avoid heavy lifting for one week.  You may bathe or shower.  Do not douche, use tampons, or resume intercourse until the bleeding has ceased.    Emergency Care  Contact your physician if you have any of these problems:   1.  A fever over 100 F   2.  A large amount of bleeding or drainage   3.  Severe pain             **If you have questions or concerns about your procedure,   call Dr Mixon at [phone]**

## 2018-11-28 NOTE — ANESTHESIA POSTPROCEDURE EVALUATION
Patient: Elizabeth Jerome    Procedure(s):  OPERATIVE HYSTEROSCOPY WITH MORCELLATOR FOR MYOMECTOMY(MYOSURE)    Diagnosis:FIBROIDS  Diagnosis Additional Information: No value filed.    Anesthesia Type:  General, LMA    Note:  Anesthesia Post Evaluation    Patient location during evaluation: PACU  Patient participation: Able to fully participate in evaluation  Level of consciousness: awake and alert  Pain management: adequate  Airway patency: patent  Cardiovascular status: acceptable and hemodynamically stable  Respiratory status: acceptable and unassisted  Hydration status: acceptable  PONV: none             Last vitals:  Vitals:    11/28/18 1222 11/28/18 1425 11/28/18 1430   BP: 118/77 123/83 126/88   Resp: 16 12 16   Temp: 36.8  C (98.3  F) 36  C (96.8  F) 36  C (96.8  F)   SpO2: 100% 100% 100%         Electronically Signed By: Magdiel Lew DO  November 28, 2018  3:16 PM

## 2018-11-28 NOTE — IP AVS SNAPSHOT
[facility] Same Day Surgery    [address]    [place] [address]    Phone:  [phone]    Fax:  [phone]                                       After Visit Summary   11/28/2018    Elizabeth Jerome    MRN: [id number]           After Visit Summary Signature Page     I have received my discharge instructions, and my questions have been answered. I have discussed any challenges I see with this plan with the nurse or doctor.    ..........................................................................................................................................  Patient/Patient Representative Signature      ..........................................................................................................................................  Patient Representative Print Name and Relationship to Patient    ..................................................               ................................................  Date                                   Time    ..........................................................................................................................................  Reviewed by Signature/Title    ...................................................              ..............................................  Date                                               Time          22EPIC Rev 08/18

## 2018-11-28 NOTE — H&P
This H&P has been reviewed and there are no clinically significant changes in the patient s condition.  The Patient is approved for surgery.    Laura Mixon MD

## 2018-11-28 NOTE — BRIEF OP NOTE
[facility] Brief Operative Note    Pre-operative diagnosis: FIBROIDS   Post-operative diagnosis Intramural Leiomyoma   Procedure: Procedure(s):  OPERATIVE HYSTEROSCOPY WITH MORCELLATOR FOR MYOMECTOMY(MYOSURE)   Surgeon(s): Surgeon(s) and Role:     * Laura Mixon MD - Primary   Estimated blood loss: 5 mL Fluid Deficit: 415 ml.   Specimens:   ID Type Source Tests Collected by Time Destination   A : Endometrial Curettings  Tissue Endometrium SURGICAL PATHOLOGY EXAM Laura Mixon MD 11/28/2018  1:50 PM       Findings: Heavy menses. No intracavitary component to the leiomyoma.    Dictation Reference Number: [id number]

## 2018-11-28 NOTE — ANESTHESIA PREPROCEDURE EVALUATION
Anesthesia Evaluation     . Pt has had prior anesthetic. Type: General    No history of anesthetic complications          ROS/MED HX    ENT/Pulmonary:  - neg pulmonary ROS     Neurologic:  - neg neurologic ROS     Cardiovascular:  - neg cardiovascular ROS       METS/Exercise Tolerance:     Hematologic:  - neg hematologic  ROS       Musculoskeletal:  - neg musculoskeletal ROS       GI/Hepatic:  - neg GI/hepatic ROS       Renal/Genitourinary: Comment: Uterine fibroids        Endo:  - neg endo ROS       Psychiatric:         Infectious Disease:  - neg infectious disease ROS       Malignancy:         Other:                     Physical Exam  Normal systems: cardiovascular, pulmonary and dental    Airway   Mallampati: I  TM distance: >3 FB  Neck ROM: full    Dental     Cardiovascular       Pulmonary                     Anesthesia Plan      History & Physical Review  History and physical reviewed and following examination; no interval change.    ASA Status:  3 .    NPO Status:  > 8 hours    Plan for General and LMA with Intravenous and Propofol induction. Maintenance will be Balanced.    PONV prophylaxis:  Ondansetron (or other 5HT-3) and Dexamethasone or Solumedrol  Additional equipment: Videolaryngoscope      Postoperative Care  Postoperative pain management:  IV analgesics and Multi-modal analgesia.      Consents  Anesthetic plan, risks, benefits and alternatives discussed with:  Patient..                          .

## 2018-11-28 NOTE — ANESTHESIA CARE TRANSFER NOTE
Patient: Elizabeth Jerome    Procedure(s):  OPERATIVE HYSTEROSCOPY WITH MORCELLATOR FOR MYOMECTOMY(MYOSURE)    Diagnosis: FIBROIDS  Diagnosis Additional Information: No value filed.    Anesthesia Type:   General, LMA     Note:  Airway :Face Mask  Patient transferred to:PACU  Comments: 1423:  To par responsive, dentition unchanged from pre-op.Handoff Report: Identifed the Patient, Identified the Reponsible Provider, Reviewed the pertinent medical history, Discussed the surgical course, Reviewed Intra-OP anesthesia mangement and issues during anesthesia, Set expectations for post-procedure period and Allowed opportunity for questions and acknowledgement of understanding      Vitals: (Last set prior to Anesthesia Care Transfer)    [facility] VITALS  11/28/2018 1353 - 11/28/2018 1423      11/28/2018             NIBP: 121/90    Pulse: 70    NIBP Mean: 98    SpO2: 100 %    Resp Rate (set): 10                Electronically Signed By: JEAN CARLOS Alexandra CRNA  November 28, 2018  2:23 PM

## 2018-11-28 NOTE — IP AVS SNAPSHOT
MRN:[id number]                      After Visit Summary   11/28/2018    Elizabeth Jerome    MRN: [id number]           Thank you!     Thank you for choosing [facility] for your care. Our goal is always to provide you with excellent care. Hearing back from our patients is one way we can continue to improve our services. Please take a few minutes to complete the written survey that you may receive in the mail after you visit with us. Thank you!        Patient Information     Date Of Birth          1975        About your hospital stay     You were admitted on:  November 28, 2018 You last received care in the:  [facility]    You were discharged on:  November 28, 2018       Who to Call     For medical emergencies, please call 911.  For non-urgent questions about your medical care, please call your primary care provider or clinic, [phone]  For questions related to your surgery, please call your surgery clinic        Attending Provider     Provider Laura Orozco MD OB/Gyn       Primary Care Provider Office Phone # Fax #    [facility] [phone]      After Care Instructions     Discharge Instructions       Patient to arrange follow up appointment in 4  weeks            Discharge Instructions       Pelvic Rest. No tampons, douching or intercourse for  2  weeks.                  Further instructions from your care team       Same Day Surgery Discharge Instructions for  Sedation and General Anesthesia       It's not unusual to feel dizzy, light-headed or faint for up to 24 hours after surgery or while taking pain medication.  If you have these symptoms: sit for a few minutes before standing and have someone assist you when you get up to walk or use the bathroom.      You should rest and relax for the next 24 hours. We recommend you make arrangements to have an adult stay with you for at least 24 hours after  your discharge.  Avoid hazardous and strenuous activity.      DO NOT DRIVE any vehicle or operate mechanical equipment for 24 hours following the end of your surgery.  Even though you may feel normal, your reactions may be affected by the medication you have received.      Do not drink alcoholic beverages for 24 hours following surgery.       Slowly progress to your regular diet as you feel able. It's not unusual to feel nauseated and/or vomit after receiving anesthesia.  If you develop these symptoms, drink clear liquids (apple juice, ginger ale, broth, 7-up, etc. ) until you feel better.  If your nausea and vomiting persists for 24 hours, please notify your surgeon.        All narcotic pain medications, along with inactivity and anesthesia, can cause constipation. Drinking plenty of liquids and increasing fiber intake will help.      For any questions of a medical nature, call your surgeon.      Do not make important decisions for 24 hours.      If you had general anesthesia, you may have a sore throat for a couple of days related to the breathing tube used during surgery.  You may use Cepacol lozenges to help with this discomfort.  If it worsens or if you develop a fever, contact your surgeon.       If you feel your pain is not well managed with the pain medications prescribed by your surgeon, please contact your surgeon's office to let them know so they can address your concerns.       Today you received Toradol, an antiinflammatory medication similar to Ibuprofen.  You should not take other antiinflammatory medication, such as Ibuprofen, Motrin, Advil, Aleve, Naprosyn, etc until 9:50 PM      HOME CARE FOLLOWING HYSTEROSCOPY    Diet  You have no restrictions on your diet.  During the evening following surgery, drink plenty of fluids and eat a light supper.    Nausea  The anesthesia may produce some nausea.  If you feel nauseated, stay in bed and try drinking fluids such as 7-Up, tea, or soup.    Discomfort  The  "amount of discomfort you can expect is very unpredictable.  If you have pain that cannot be controlled with Tylenol or with the prescription you may have received, you should notify your physician.  Abdominal cramping or low backache is not uncommon and should not be a cause for concern.  You will be drowsy and weak the day of surgery and possibly the following day.  Fever  A low grade fever (not over 100 F) is usual after this procedure.  Do not hesitate to notify your physician if your fever seems excessive or persists.    Activity   You may resume your normal activity.  Avoid heavy lifting for one week.  You may bathe or shower.  Do not douche, use tampons, or resume intercourse until the bleeding has ceased.    Emergency Care  Contact your physician if you have any of these problems:   1.  A fever over 100 F   2.  A large amount of bleeding or drainage   3.  Severe pain             **If you have questions or concerns about your procedure,   call Dr Mixon at [phone]**    Pending Results     Date and Time Order Name Status Description    11/28/2018 1413 Surgical pathology exam In process             Admission Information     Date & Time Provider Department Dept. Phone    11/28/2018 Laura Mixon MD [facility] Same Day Surgery [phone]      Your Vitals Were     Blood Pressure Temperature Respirations Height Weight Last Period    122/82 97  F (36.1  C) (Temporal) 17 1.626 m (5' 4\") 74.4 kg (164 lb) 10/05/2018    Pulse Oximetry BMI (Body Mass Index)                100% 28.15 kg/m2          [employer] Information     [employer] lets you send messages to your doctor, view your test results, renew your prescriptions, schedule appointments and more. To sign up, go to [email or web address] . Click on \"Log in\" on the left side of the screen, which will take you to the Welcome page. Then click on \"Sign up Now\" on the right side of the page.     You will be asked to enter the access code " listed below, as well as some personal information. Please follow the directions to create your username and password.     Your access code is: [email or web address]  Expires: 2019 11:42 AM     Your access code will  in 90 days. If you need help or a new code, please call your [place] clinic or [phone].        Care EveryWhere ID     This is your Care EveryWhere ID. This could be used by other organizations to access your [place] medical records  [id number]        Equal Access to Services     SAMANTHA SHELL : Hadii farzana morocho hadgweno Soomaali, waaxda luqadaha, qaybta kaalmada eriberto, abad santiago . So [facility] [phone].    ATENCIÓN: Si habla español, tiene a ibrahim disposición servicios gratuitos de asistencia lingüística. Llame al [phone].    We comply with applicable federal civil rights laws and [place] laws. We do not discriminate on the basis of race, color, national origin, age, disability, sex, sexual orientation, or gender identity.               Review of your medicines      START taking        Dose / Directions    ibuprofen 600 MG tablet   Commonly known as:  ADVIL/MOTRIN   Used for:  Intramural leiomyoma of uterus        Dose:  600 mg   Take 1 tablet (600 mg) by mouth every 6 hours as needed for other (mild and/or inflammatory pain)   Quantity:  60 tablet   Refills:  0         CONTINUE these medicines which have NOT CHANGED        Dose / Directions    folic acid 1 MG tablet   Commonly known as:  FOLVITE   Used for:  Encounter for gynecological examination without abnormal finding        Dose:  1 mg   Take 1 tablet (1 mg) by mouth daily   Quantity:  100 tablet   Refills:  3       IRON SUPPLEMENT PO        Dose:  325 mg   Take 325 mg by mouth daily   Refills:  0       Multi-vitamin tablet        Dose:  1 tablet   Take 1 tablet by mouth daily   Refills:  0       SUPER B COMPLEX PO        Dose:  1 tablet   Take 1 tablet by mouth daily   Refills:  0       VITAMIN D PO         Dose:  400 Units   Take 400 Units by mouth daily   Refills:  0       VITAMIN E PO        Dose:  400 Units   Take 400 Units by mouth daily   Refills:  0            Where to get your medicines      These medications were sent to [facility] [place], [place] - [address]  [address], [place] [address]     Phone:  [phone]     ibuprofen 600 MG tablet                Protect others around you: Learn how to safely use, store and throw away your medicines at www.disposemymeds.org.             Medication List: This is a list of all your medications and when to take them. Check marks below indicate your daily home schedule. Keep this list as a reference.      Medications           Morning Afternoon Evening Bedtime As Needed    folic acid 1 MG tablet   Commonly known as:  FOLVITE   Take 1 tablet (1 mg) by mouth daily                                ibuprofen 600 MG tablet   Commonly known as:  ADVIL/MOTRIN   Take 1 tablet (600 mg) by mouth every 6 hours as needed for other (mild and/or inflammatory pain)                                IRON SUPPLEMENT PO   Take 325 mg by mouth daily                                Multi-vitamin tablet   Take 1 tablet by mouth daily                                SUPER B COMPLEX PO   Take 1 tablet by mouth daily                                VITAMIN D PO   Take 400 Units by mouth daily                                VITAMIN E PO   Take 400 Units by mouth daily

## 2018-11-29 ENCOUNTER — TELEPHONE (OUTPATIENT)
Dept: OBGYN | Facility: CLINIC | Age: 43
End: 2018-11-29

## 2018-11-29 LAB — COPATH REPORT: NORMAL

## 2018-11-29 NOTE — TELEPHONE ENCOUNTER
Pt calling  S/P surgery 11/28/18  Pt inquiring on outcome. Explained there was nothing inside the uterus to remove as suspected previously  Pt does wish to speak with Dr. Mixon at her convenience  Also asking if ME would update her doctor at:  [facility] Dr. David Warren  [phone]  ME states she will attempt to contact him to give an update on outcome of surgery.    Routing pt request to ME to advise-call pt if possible.

## 2018-11-29 NOTE — OP NOTE
Procedure Date: 2018      PREOPERATIVE DIAGNOSIS:  Leiomyoma with concern for submucosal component.      POSTOPERATIVE DIAGNOSIS:  Intramural leiomyoma without submucosal component.      PROCEDURES PERFORMED:  Diagnostic hysteroscopy with MyoSure.      PRIMARY SURGEON:  Laura Mixon MD      ESTIMATED BLOOD LOSS:  5 mL from the actual procedure.  However, heavy menses during the procedure.     Fluid Deficit: 415ml     SPECIMENS:  Endometrial curettings and MyoSure.      FINDINGS:  Heavy menses.  No intracavitary component to the leiomyoma.      INDICATIONS:  The patient is a 43-year-old  4, para 1-0-3-1, with secondary infertility.  She had been seen by reproductive endocrinologist who saw an intramural leiomyoma with concern for a submucosal component based on the 3D renderings of her images.  The images were reviewed by myself prior to the procedure and I agree that there likely was a submucosal component.      DESCRIPTION OF PROCEDURE:  The patient was taken to the operating room, IV fluids running.  She received Ancef for infection prophylaxis due to possible resection.  She was then placed in dorsal supine position under general anesthesia. There was heavy menses noted at the time of surgical prep.  She was prepped and draped in the usual sterile fashion.  After final timeout was obtained.  A bivalve speculum was placed into the vaginal vault.  The hysteroscope was introduced into the endometrial cavity.  There was significant difficulty in obtaining good visualization of the endometrial cavity.  Therefore, the MyoSure device was introduced to allow for better suction of the blood clots that were in the endometrial cavity.  With sufficient evacuation of the blood clots, the endometrial cavity was examined in a 360 fashion.  There was no submucosal component to the leiomyoma that had been seen on her ultrasound.  Endometrial curettings were then performed in a circumferential pattern with the  MyoSure device and after the curettings were performed.  The hysteroscope was removed.  After the tenaculum was removed, pressure was held against the tenaculum insertion site with adequate hemostasis, therefore, the speculum was removed.      The patient tolerated the procedure well without any immediate complications.  She was taken to recovery room in stable condition.         CAEMRON SANTIAGO MD             D: 2018   T: 2018   MT: EDUARDO      Name:     ZACHARY BRANHAM   MRN:      [id number]        Account:        [id number]   :      1975           Procedure Date: 2018      Document: [id number]

## 2019-08-05 NOTE — PROGRESS NOTES
Elizabeth is a 44 year old  female who presents for annual exam.     Besides routine health maintenance,  she would like to discuss painful periods.    HPI:  The patient's PCP is  [facility].  Doing well. Had one round of IVF with two embryos that did not take. She will proceed with donor egg. She would like to have a repeat hysteroscopy. Her JESSA thinks the fibroid was not seen due to her menses at the time. She would like to have this repeat procedure before her next round of IVF. She is fasting today and would like labs. Her menses continue to be heavy and painful.   Since her procedure in November she has been having on and off vaginal discharge that is clear. There is no odor and there is no itching noted.    GYNECOLOGIC HISTORY:    Patient's last menstrual period was 2019.  Her current contraception method is: none.  She  reports that she has never smoked. She has never used smokeless tobacco.    Patient is sexually active.  STD testing offered?  Declined  Last PHQ-9 score on record =   PHQ-9 SCORE 2019   PHQ-9 Total Score 5     Last GAD7 score on record =   MILEY-7 SCORE 2019   Total Score 0     Alcohol Score = 4    HEALTH MAINTENANCE:  Recent Labs   Lab Test 18  1602 17  1228   CHOL 187 169   HDL 61 67   * 90   TRIG 52 62   Glucose: 86  TSH: 1.82    Last Mammo: 18, Result: normal, Next Mammo: today   Pap:   Lab Results   Component Value Date    PAP NIL HPV- 2017      Colonoscopy:  never, Result: not applicable, Next Colonoscopy: age 50.  Dexa:  never    Health maintenance updated:  yes    HISTORY:  OB History    Para Term  AB Living   4 1 1 0 3 1   SAB TAB Ectopic Multiple Live Births   0 3 0 0 1      # Outcome Date GA Lbr Rosales/2nd Weight Sex Delivery Anes PTL Lv   4 Term 03    M -SEC   CHRISTINE   3 TAB            2 TAB            1 TAB                Patient Active Problem List   Diagnosis     Intramural  "leiomyoma of uterus     Past Surgical History:   Procedure Laterality Date      SECTION  2003     GYN SURGERY  10/2014, 2002    Abdominal Myomectomies x 2     OPERATIVE HYSTEROSCOPY WITH MORCELLATOR N/A 2018    Procedure: OPERATIVE HYSTEROSCOPY WITH MORCELLATOR FOR MYOMECTOMY(MYOSURE);  Surgeon: Laura Mixon MD;  Location: [facility]      Social History     Tobacco Use     Smoking status: Never Smoker     Smokeless tobacco: Never Used   Substance Use Topics     Alcohol use: Yes     Alcohol/week: 0.0 oz     Comment: rare      Problem (# of Occurrences) Relation (Name,Age of Onset)    Diabetes (1) Maternal Grandmother    Hyperlipidemia (2) Mother, Brother    Hypertension (2) Mother, Father            Current Outpatient Medications   Medication Sig     B Complex-C (SUPER B COMPLEX PO) Take 1 tablet by mouth daily     Cholecalciferol (VITAMIN D PO) Take 400 Units by mouth daily      Ferrous Sulfate (IRON SUPPLEMENT PO) Take 325 mg by mouth daily      folic acid (FOLVITE) 1 MG tablet Take 1 tablet (1 mg) by mouth daily     medroxyPROGESTERone (PROVERA) 5 MG tablet Take 1 tablet (5 mg) by mouth daily Start with your period and take this until your surgery     multivitamin, therapeutic with minerals (MULTI-VITAMIN) TABS tablet Take 1 tablet by mouth daily     VITAMIN E PO Take 400 Units by mouth daily      No current facility-administered medications for this visit.      No Known Allergies    Past medical, surgical, social and family histories were reviewed and updated in EPIC.    ROS:   12 point review of systems negative other than symptoms noted below.  Constitutional: Fatigue  Respiratory: Cough  Gastrointestinal: Abdominal Pain, Bloating and Heartburn  Genitourinary: Cramps, Irregular Menses, Pelvic Pain, Vaginal Discharge and Vaginal Itching  Musculoskeletal: Joint Pain    EXAM:  /70   Pulse 76   Ht 1.613 m (5' 3.5\")   Wt 72.1 kg (159 lb)   LMP 2019   BMI 27.72 " kg/m     BMI: Body mass index is 27.72 kg/m .    PHYSICAL EXAM:  Constitutional:  Appearance: Well nourished, well developed, alert, in no acute distress  Neck:  Lymph Nodes:  No lymphadenopathy present    Thyroid:  Gland size normal, nontender, no nodules or masses present on palpation  Chest:  Respiratory Effort:  Breathing unlabored, clear to auscultation bilaterally.  Cardiovascular:    Heart: Auscultation:  Regular rate, normal rhythm, no murmurs present  Breasts: Inspection of Breasts:  No lymphadenopathy present., Palpation of Breasts and Axillae:  No masses present on palpation, no breast tenderness., Axillary Lymph Nodes:  No lymphadenopathy present. and No nodularity, asymmetry or nipple discharge bilaterally.  Gastrointestinal:   Abdominal Examination:  Abdomen nontender to palpation, tone normal without rigidity or guarding, no masses present, umbilicus without lesions, well healed midline scar   Liver and Spleen:  No hepatomegaly present, liver nontender to palpation    Hernias:  No hernias present  Lymphatic: Lymph Nodes:  No other lymphadenopathy present  Skin:  General Inspection:  No rashes present, no lesions present, no areas of  discoloration    Genitalia and Groin:  No rashes present, no lesions present, no areas of  discoloration, no masses present  Neurologic/Psychiatric:    Mental Status:  Oriented X3     Pelvic Exam:  External Genitalia:     Normal appearance for age, no discharge present, no tenderness present, no inflammatory lesions present, color normal  Vagina:     Normal vaginal vault without central or paravaginal defects, no discharge present, no inflammatory lesions present, no masses present  Bladder:     Nontender to palpation  Urethra:   Urethral Body:  Urethra palpation normal, urethra structural support normal   Urethral Meatus:  No erythema or lesions present  Cervix:     Appearance healthy, no lesions present, nontender to palpation, no bleeding present  Uterus:     Uterus:  firm, normal sized and nontender, anteverted in position.   Adnexa:     No adnexal tenderness present, no adnexal masses present  Perineum:     Perineum within normal limits, no evidence of trauma, no rashes or skin lesions present  Anus:     Anus within normal limits, no hemorrhoids present  Inguinal Lymph Nodes:     No lymphadenopathy present  Pubic Hair:     Normal pubic hair distribution for age  Genitalia and Groin:     No rashes present, no lesions present, no areas of discoloration, no masses present      COUNSELING:   Reviewed preventive health counseling, as reflected in patient instructions       Regular exercise       Healthy diet/nutrition    BMI: Body mass index is 27.72 kg/m .      ASSESSMENT:  44 year old female with satisfactory annual exam.    ICD-10-CM    1. Encounter for gynecological examination without abnormal finding Z01.419    2. Vaginal discharge N89.8 Gram stain     Yeast culture     Group B strep PCR   3. Submucous leiomyoma of uterus D25.0 medroxyPROGESTERone (PROVERA) 5 MG tablet     Elizabeth-Operative Worksheet   4. Iron deficiency anemia due to chronic blood loss D50.0 Hemoglobin     Ferritin   5. Screening for lipid disorders Z13.220 Lipid Profile (Chol, Trig, HDL, LDL calc)   6. Screening for metabolic disorder Z13.228 Comprehensive metabolic panel (BMP + Alb, Alk Phos, ALT, AST, Total. Bili, TP)   7. Subclinical hypothyroidism E03.9 TSH with free T4 reflex       PLAN:  Gram stain and vaginal cultures done although her exam was completely negative today.  We will also send for fasting labs as well today.  We will repeat the hysteroscopy. This time we will ensure she is not on her menses and pre-treat with provera.    Laura iMxon MD

## 2019-08-06 ENCOUNTER — OFFICE VISIT (OUTPATIENT)
Dept: OBGYN | Facility: CLINIC | Age: 44
End: 2019-08-06
Payer: COMMERCIAL

## 2019-08-06 ENCOUNTER — ANCILLARY PROCEDURE (OUTPATIENT)
Dept: MAMMOGRAPHY | Facility: CLINIC | Age: 44
End: 2019-08-06
Payer: COMMERCIAL

## 2019-08-06 ENCOUNTER — TELEPHONE (OUTPATIENT)
Dept: OBGYN | Facility: CLINIC | Age: 44
End: 2019-08-06

## 2019-08-06 VITALS
HEART RATE: 76 BPM | BODY MASS INDEX: 27.14 KG/M2 | HEIGHT: 64 IN | WEIGHT: 159 LBS | SYSTOLIC BLOOD PRESSURE: 100 MMHG | DIASTOLIC BLOOD PRESSURE: 70 MMHG

## 2019-08-06 DIAGNOSIS — Z13.228 SCREENING FOR METABOLIC DISORDER: ICD-10-CM

## 2019-08-06 DIAGNOSIS — N89.8 VAGINAL DISCHARGE: ICD-10-CM

## 2019-08-06 DIAGNOSIS — E03.8 SUBCLINICAL HYPOTHYROIDISM: ICD-10-CM

## 2019-08-06 DIAGNOSIS — D25.0 SUBMUCOUS LEIOMYOMA OF UTERUS: ICD-10-CM

## 2019-08-06 DIAGNOSIS — Z13.220 SCREENING FOR LIPID DISORDERS: ICD-10-CM

## 2019-08-06 DIAGNOSIS — D50.0 IRON DEFICIENCY ANEMIA DUE TO CHRONIC BLOOD LOSS: ICD-10-CM

## 2019-08-06 DIAGNOSIS — Z01.419 ENCOUNTER FOR GYNECOLOGICAL EXAMINATION WITHOUT ABNORMAL FINDING: Primary | ICD-10-CM

## 2019-08-06 DIAGNOSIS — Z12.31 VISIT FOR SCREENING MAMMOGRAM: ICD-10-CM

## 2019-08-06 LAB
GRAM STN SPEC: NORMAL
GRAM STN SPEC: NORMAL
HGB BLD-MCNC: 8 G/DL (ref 11.7–15.7)
Lab: NORMAL
SPECIMEN SOURCE: NORMAL

## 2019-08-06 PROCEDURE — 87653 STREP B DNA AMP PROBE: CPT | Performed by: OBSTETRICS & GYNECOLOGY

## 2019-08-06 PROCEDURE — 80053 COMPREHEN METABOLIC PANEL: CPT | Performed by: OBSTETRICS & GYNECOLOGY

## 2019-08-06 PROCEDURE — 36415 COLL VENOUS BLD VENIPUNCTURE: CPT | Performed by: OBSTETRICS & GYNECOLOGY

## 2019-08-06 PROCEDURE — 84443 ASSAY THYROID STIM HORMONE: CPT | Performed by: OBSTETRICS & GYNECOLOGY

## 2019-08-06 PROCEDURE — 99396 PREV VISIT EST AGE 40-64: CPT | Performed by: OBSTETRICS & GYNECOLOGY

## 2019-08-06 PROCEDURE — 82728 ASSAY OF FERRITIN: CPT | Performed by: OBSTETRICS & GYNECOLOGY

## 2019-08-06 PROCEDURE — 99213 OFFICE O/P EST LOW 20 MIN: CPT | Mod: 25 | Performed by: OBSTETRICS & GYNECOLOGY

## 2019-08-06 PROCEDURE — 87102 FUNGUS ISOLATION CULTURE: CPT | Performed by: OBSTETRICS & GYNECOLOGY

## 2019-08-06 PROCEDURE — 80061 LIPID PANEL: CPT | Performed by: OBSTETRICS & GYNECOLOGY

## 2019-08-06 PROCEDURE — 77067 SCR MAMMO BI INCL CAD: CPT | Mod: TC

## 2019-08-06 PROCEDURE — 85018 HEMOGLOBIN: CPT | Performed by: OBSTETRICS & GYNECOLOGY

## 2019-08-06 PROCEDURE — 87205 SMEAR GRAM STAIN: CPT | Performed by: OBSTETRICS & GYNECOLOGY

## 2019-08-06 RX ORDER — MEDROXYPROGESTERONE ACETATE 5 MG
5 TABLET ORAL DAILY
Qty: 30 TABLET | Refills: 0 | Status: ON HOLD | OUTPATIENT
Start: 2019-08-06 | End: 2019-08-21

## 2019-08-06 ASSESSMENT — ANXIETY QUESTIONNAIRES
1. FEELING NERVOUS, ANXIOUS, OR ON EDGE: NOT AT ALL
5. BEING SO RESTLESS THAT IT IS HARD TO SIT STILL: NOT AT ALL
6. BECOMING EASILY ANNOYED OR IRRITABLE: NOT AT ALL
2. NOT BEING ABLE TO STOP OR CONTROL WORRYING: NOT AT ALL
3. WORRYING TOO MUCH ABOUT DIFFERENT THINGS: NOT AT ALL
GAD7 TOTAL SCORE: 0
7. FEELING AFRAID AS IF SOMETHING AWFUL MIGHT HAPPEN: NOT AT ALL

## 2019-08-06 ASSESSMENT — PATIENT HEALTH QUESTIONNAIRE - PHQ9
SUM OF ALL RESPONSES TO PHQ QUESTIONS 1-9: 5
5. POOR APPETITE OR OVEREATING: NOT AT ALL

## 2019-08-06 ASSESSMENT — MIFFLIN-ST. JEOR: SCORE: 1348.28

## 2019-08-06 NOTE — TELEPHONE ENCOUNTER
Order Questions     Question Answer Comment   Procedure name(s) - multi select Operative hysteroscopy with myosure    Is this a multi surgeon case? No    Laterality N/A    Reason for procedure Submucosal fibroid    Urgency of Surgery  End of the month   Location of Case: [facility] OR    Surgeon Procedure Time (incision to closure) in minutes (per procedure as applicable) 60    Note:  Surgical Case Time Needed (in minutes)   Date of Surgery (Requested): 8/28/2019 8/21 ok as well   Patient Class (for admit prior to surgery, specify number of days in comments): Same day (hospital outpatient)    Why can t this outpatient surgery be done at the [facility] or [facility]? NA    Anesthesia General    H&P To Be Completed By: Surgeon    Where is the note? In EpicProgress Note    Post-Op Appointment 2 weeks    Procedure will be performed or supervised by: Laura Mixon    Vendor Needed? Yes    Specify vendor: Myosure

## 2019-08-07 LAB
ALBUMIN SERPL-MCNC: 3.7 G/DL (ref 3.4–5)
ALP SERPL-CCNC: 72 U/L (ref 40–150)
ALT SERPL W P-5'-P-CCNC: 18 U/L (ref 0–50)
ANION GAP SERPL CALCULATED.3IONS-SCNC: 9 MMOL/L (ref 3–14)
AST SERPL W P-5'-P-CCNC: 15 U/L (ref 0–45)
BILIRUB SERPL-MCNC: 0.7 MG/DL (ref 0.2–1.3)
BUN SERPL-MCNC: 8 MG/DL (ref 7–30)
CALCIUM SERPL-MCNC: 8.6 MG/DL (ref 8.5–10.1)
CHLORIDE SERPL-SCNC: 112 MMOL/L (ref 94–109)
CHOLEST SERPL-MCNC: 173 MG/DL
CO2 SERPL-SCNC: 23 MMOL/L (ref 20–32)
CREAT SERPL-MCNC: 0.73 MG/DL (ref 0.52–1.04)
FERRITIN SERPL-MCNC: 6 NG/ML (ref 12–150)
GFR SERPL CREATININE-BSD FRML MDRD: >90 ML/MIN/{1.73_M2}
GLUCOSE SERPL-MCNC: 82 MG/DL (ref 70–99)
GP B STREP DNA SPEC QL NAA+PROBE: NEGATIVE
HDLC SERPL-MCNC: 56 MG/DL
LDLC SERPL CALC-MCNC: 107 MG/DL
NONHDLC SERPL-MCNC: 117 MG/DL
POTASSIUM SERPL-SCNC: 4.5 MMOL/L (ref 3.4–5.3)
PROT SERPL-MCNC: 7.6 G/DL (ref 6.8–8.8)
SODIUM SERPL-SCNC: 144 MMOL/L (ref 133–144)
SPECIMEN SOURCE: NORMAL
TRIGL SERPL-MCNC: 51 MG/DL
TSH SERPL DL<=0.005 MIU/L-ACNC: 2.28 MU/L (ref 0.4–4)

## 2019-08-07 ASSESSMENT — ANXIETY QUESTIONNAIRES: GAD7 TOTAL SCORE: 0

## 2019-08-07 NOTE — TELEPHONE ENCOUNTER
Type of surgery: HSC w/MYOSURE  Location of surgery: [facility] OR  Date and time of surgery: 8/21/2019 7:30a ARRIVAL 5:30a  Surgeon: Oral  Pre-Op Appt Date: 8/6/2019  Post-Op Appt Date: TBD   Packet sent out: MAILED 8/7/2019  Pre-cert/Authorization completed:  TBD  Date: 8/7/2019 Margot w/Katina Guerrero  Surgery Scheduler    DX D25.0  [facility] [id number]

## 2019-08-12 LAB
Lab: NORMAL
SPECIMEN SOURCE: NORMAL
YEAST SPEC QL CULT: NORMAL

## 2019-08-21 ENCOUNTER — HOSPITAL ENCOUNTER (OUTPATIENT)
Facility: CLINIC | Age: 44
Discharge: HOME | End: 2019-08-21
Attending: OBSTETRICS & GYNECOLOGY | Admitting: OBSTETRICS & GYNECOLOGY
Payer: COMMERCIAL

## 2019-08-21 ENCOUNTER — ANESTHESIA EVENT (OUTPATIENT)
Dept: SURGERY | Facility: CLINIC | Age: 44
End: 2019-08-21
Payer: COMMERCIAL

## 2019-08-21 ENCOUNTER — ANESTHESIA (OUTPATIENT)
Dept: SURGERY | Facility: CLINIC | Age: 44
End: 2019-08-21
Payer: COMMERCIAL

## 2019-08-21 VITALS
DIASTOLIC BLOOD PRESSURE: 76 MMHG | HEART RATE: 72 BPM | WEIGHT: 158.2 LBS | TEMPERATURE: 97.7 F | OXYGEN SATURATION: 96 % | RESPIRATION RATE: 19 BRPM | SYSTOLIC BLOOD PRESSURE: 124 MMHG | HEIGHT: 64 IN | BODY MASS INDEX: 27.01 KG/M2

## 2019-08-21 DIAGNOSIS — Z98.890 STATUS POST HYSTEROSCOPIC MYOMECTOMY: Primary | ICD-10-CM

## 2019-08-21 LAB — B-HCG SERPL-ACNC: <1 IU/L (ref 0–5)

## 2019-08-21 PROCEDURE — 25000128 H RX IP 250 OP 636: Performed by: NURSE ANESTHETIST, CERTIFIED REGISTERED

## 2019-08-21 PROCEDURE — 25000566 ZZH SEVOFLURANE, EA 15 MIN: Performed by: OBSTETRICS & GYNECOLOGY

## 2019-08-21 PROCEDURE — 36000058 ZZH SURGERY LEVEL 3 EA 15 ADDTL MIN: Performed by: OBSTETRICS & GYNECOLOGY

## 2019-08-21 PROCEDURE — 71000013 ZZH RECOVERY PHASE 1 LEVEL 1 EA ADDTL HR: Performed by: OBSTETRICS & GYNECOLOGY

## 2019-08-21 PROCEDURE — 40000170 ZZH STATISTIC PRE-PROCEDURE ASSESSMENT II: Performed by: OBSTETRICS & GYNECOLOGY

## 2019-08-21 PROCEDURE — 25800030 ZZH RX IP 258 OP 636: Performed by: NURSE ANESTHETIST, CERTIFIED REGISTERED

## 2019-08-21 PROCEDURE — 58561 HYSTEROSCOPY REMOVE MYOMA: CPT | Performed by: OBSTETRICS & GYNECOLOGY

## 2019-08-21 PROCEDURE — 36415 COLL VENOUS BLD VENIPUNCTURE: CPT | Performed by: OBSTETRICS & GYNECOLOGY

## 2019-08-21 PROCEDURE — 84702 CHORIONIC GONADOTROPIN TEST: CPT | Performed by: OBSTETRICS & GYNECOLOGY

## 2019-08-21 PROCEDURE — 36000056 ZZH SURGERY LEVEL 3 1ST 30 MIN: Performed by: OBSTETRICS & GYNECOLOGY

## 2019-08-21 PROCEDURE — 71000027 ZZH RECOVERY PHASE 2 EACH 15 MINS: Performed by: OBSTETRICS & GYNECOLOGY

## 2019-08-21 PROCEDURE — 25000125 ZZHC RX 250: Performed by: NURSE ANESTHETIST, CERTIFIED REGISTERED

## 2019-08-21 PROCEDURE — 27210794 ZZH OR GENERAL SUPPLY STERILE: Performed by: OBSTETRICS & GYNECOLOGY

## 2019-08-21 PROCEDURE — 88305 TISSUE EXAM BY PATHOLOGIST: CPT | Performed by: OBSTETRICS & GYNECOLOGY

## 2019-08-21 PROCEDURE — 25000132 ZZH RX MED GY IP 250 OP 250 PS 637: Performed by: OBSTETRICS & GYNECOLOGY

## 2019-08-21 PROCEDURE — 37000009 ZZH ANESTHESIA TECHNICAL FEE, EACH ADDTL 15 MIN: Performed by: OBSTETRICS & GYNECOLOGY

## 2019-08-21 PROCEDURE — 88305 TISSUE EXAM BY PATHOLOGIST: CPT | Mod: 26 | Performed by: OBSTETRICS & GYNECOLOGY

## 2019-08-21 PROCEDURE — 37000008 ZZH ANESTHESIA TECHNICAL FEE, 1ST 30 MIN: Performed by: OBSTETRICS & GYNECOLOGY

## 2019-08-21 PROCEDURE — 71000012 ZZH RECOVERY PHASE 1 LEVEL 1 FIRST HR: Performed by: OBSTETRICS & GYNECOLOGY

## 2019-08-21 RX ORDER — ACETAMINOPHEN 325 MG/1
975 TABLET ORAL ONCE
Status: COMPLETED | OUTPATIENT
Start: 2019-08-21 | End: 2019-08-21

## 2019-08-21 RX ORDER — DEXAMETHASONE SODIUM PHOSPHATE 4 MG/ML
INJECTION, SOLUTION INTRA-ARTICULAR; INTRALESIONAL; INTRAMUSCULAR; INTRAVENOUS; SOFT TISSUE PRN
Status: DISCONTINUED | OUTPATIENT
Start: 2019-08-21 | End: 2019-08-21

## 2019-08-21 RX ORDER — IBUPROFEN 600 MG/1
600 TABLET, FILM COATED ORAL
Status: DISCONTINUED | OUTPATIENT
Start: 2019-08-21 | End: 2019-08-21 | Stop reason: HOSPADM

## 2019-08-21 RX ORDER — KETOROLAC TROMETHAMINE 30 MG/ML
INJECTION, SOLUTION INTRAMUSCULAR; INTRAVENOUS PRN
Status: DISCONTINUED | OUTPATIENT
Start: 2019-08-21 | End: 2019-08-21

## 2019-08-21 RX ORDER — OXYCODONE AND ACETAMINOPHEN 5; 325 MG/1; MG/1
1 TABLET ORAL
Status: DISCONTINUED | OUTPATIENT
Start: 2019-08-21 | End: 2019-08-21 | Stop reason: HOSPADM

## 2019-08-21 RX ORDER — MEPERIDINE HYDROCHLORIDE 25 MG/ML
12.5 INJECTION INTRAMUSCULAR; INTRAVENOUS; SUBCUTANEOUS
Status: DISCONTINUED | OUTPATIENT
Start: 2019-08-21 | End: 2019-08-21 | Stop reason: HOSPADM

## 2019-08-21 RX ORDER — PROPOFOL 10 MG/ML
INJECTION, EMULSION INTRAVENOUS PRN
Status: DISCONTINUED | OUTPATIENT
Start: 2019-08-21 | End: 2019-08-21

## 2019-08-21 RX ORDER — SODIUM CHLORIDE, SODIUM LACTATE, POTASSIUM CHLORIDE, CALCIUM CHLORIDE 600; 310; 30; 20 MG/100ML; MG/100ML; MG/100ML; MG/100ML
INJECTION, SOLUTION INTRAVENOUS CONTINUOUS
Status: DISCONTINUED | OUTPATIENT
Start: 2019-08-21 | End: 2019-08-21 | Stop reason: HOSPADM

## 2019-08-21 RX ORDER — ONDANSETRON 2 MG/ML
4 INJECTION INTRAMUSCULAR; INTRAVENOUS EVERY 30 MIN PRN
Status: DISCONTINUED | OUTPATIENT
Start: 2019-08-21 | End: 2019-08-21 | Stop reason: HOSPADM

## 2019-08-21 RX ORDER — SODIUM CHLORIDE, SODIUM LACTATE, POTASSIUM CHLORIDE, CALCIUM CHLORIDE 600; 310; 30; 20 MG/100ML; MG/100ML; MG/100ML; MG/100ML
INJECTION, SOLUTION INTRAVENOUS CONTINUOUS PRN
Status: DISCONTINUED | OUTPATIENT
Start: 2019-08-21 | End: 2019-08-21

## 2019-08-21 RX ORDER — ONDANSETRON 4 MG/1
4 TABLET, ORALLY DISINTEGRATING ORAL
Status: DISCONTINUED | OUTPATIENT
Start: 2019-08-21 | End: 2019-08-21 | Stop reason: HOSPADM

## 2019-08-21 RX ORDER — NALOXONE HYDROCHLORIDE 0.4 MG/ML
.1-.4 INJECTION, SOLUTION INTRAMUSCULAR; INTRAVENOUS; SUBCUTANEOUS
Status: DISCONTINUED | OUTPATIENT
Start: 2019-08-21 | End: 2019-08-21 | Stop reason: HOSPADM

## 2019-08-21 RX ORDER — HYDROMORPHONE HYDROCHLORIDE 1 MG/ML
.3-.5 INJECTION, SOLUTION INTRAMUSCULAR; INTRAVENOUS; SUBCUTANEOUS EVERY 10 MIN PRN
Status: DISCONTINUED | OUTPATIENT
Start: 2019-08-21 | End: 2019-08-21 | Stop reason: HOSPADM

## 2019-08-21 RX ORDER — LIDOCAINE HYDROCHLORIDE 20 MG/ML
INJECTION, SOLUTION INFILTRATION; PERINEURAL PRN
Status: DISCONTINUED | OUTPATIENT
Start: 2019-08-21 | End: 2019-08-21

## 2019-08-21 RX ORDER — ONDANSETRON 2 MG/ML
INJECTION INTRAMUSCULAR; INTRAVENOUS PRN
Status: DISCONTINUED | OUTPATIENT
Start: 2019-08-21 | End: 2019-08-21

## 2019-08-21 RX ORDER — FENTANYL CITRATE 50 UG/ML
INJECTION, SOLUTION INTRAMUSCULAR; INTRAVENOUS PRN
Status: DISCONTINUED | OUTPATIENT
Start: 2019-08-21 | End: 2019-08-21

## 2019-08-21 RX ORDER — FENTANYL CITRATE 50 UG/ML
25-50 INJECTION, SOLUTION INTRAMUSCULAR; INTRAVENOUS
Status: DISCONTINUED | OUTPATIENT
Start: 2019-08-21 | End: 2019-08-21 | Stop reason: HOSPADM

## 2019-08-21 RX ORDER — IBUPROFEN 600 MG/1
600 TABLET, FILM COATED ORAL EVERY 6 HOURS PRN
Qty: 90 TABLET | Refills: 0 | Status: SHIPPED | OUTPATIENT
Start: 2019-08-21 | End: 2019-09-04

## 2019-08-21 RX ORDER — PROPOFOL 10 MG/ML
INJECTION, EMULSION INTRAVENOUS CONTINUOUS PRN
Status: DISCONTINUED | OUTPATIENT
Start: 2019-08-21 | End: 2019-08-21

## 2019-08-21 RX ORDER — ONDANSETRON 4 MG/1
4 TABLET, ORALLY DISINTEGRATING ORAL EVERY 30 MIN PRN
Status: DISCONTINUED | OUTPATIENT
Start: 2019-08-21 | End: 2019-08-21 | Stop reason: HOSPADM

## 2019-08-21 RX ADMIN — PROPOFOL 200 MG: 10 INJECTION, EMULSION INTRAVENOUS at 07:30

## 2019-08-21 RX ADMIN — PROPOFOL 35 MCG/KG/MIN: 10 INJECTION, EMULSION INTRAVENOUS at 07:35

## 2019-08-21 RX ADMIN — ONDANSETRON 4 MG: 2 INJECTION INTRAMUSCULAR; INTRAVENOUS at 07:35

## 2019-08-21 RX ADMIN — FENTANYL CITRATE 50 MCG: 50 INJECTION, SOLUTION INTRAMUSCULAR; INTRAVENOUS at 07:30

## 2019-08-21 RX ADMIN — DEXAMETHASONE SODIUM PHOSPHATE 4 MG: 4 INJECTION, SOLUTION INTRA-ARTICULAR; INTRALESIONAL; INTRAMUSCULAR; INTRAVENOUS; SOFT TISSUE at 07:37

## 2019-08-21 RX ADMIN — FENTANYL CITRATE 25 MCG: 50 INJECTION, SOLUTION INTRAMUSCULAR; INTRAVENOUS at 07:42

## 2019-08-21 RX ADMIN — PROPOFOL 50 MG: 10 INJECTION, EMULSION INTRAVENOUS at 07:31

## 2019-08-21 RX ADMIN — MIDAZOLAM 2 MG: 1 INJECTION INTRAMUSCULAR; INTRAVENOUS at 07:27

## 2019-08-21 RX ADMIN — KETOROLAC TROMETHAMINE 30 MG: 30 INJECTION, SOLUTION INTRAMUSCULAR at 07:59

## 2019-08-21 RX ADMIN — ACETAMINOPHEN 975 MG: 325 TABLET, FILM COATED ORAL at 06:03

## 2019-08-21 RX ADMIN — LIDOCAINE HYDROCHLORIDE 60 MG: 20 INJECTION, SOLUTION INFILTRATION; PERINEURAL at 07:30

## 2019-08-21 RX ADMIN — SODIUM CHLORIDE, POTASSIUM CHLORIDE, SODIUM LACTATE AND CALCIUM CHLORIDE: 600; 310; 30; 20 INJECTION, SOLUTION INTRAVENOUS at 07:26

## 2019-08-21 ASSESSMENT — ENCOUNTER SYMPTOMS: SEIZURES: 0

## 2019-08-21 ASSESSMENT — MIFFLIN-ST. JEOR: SCORE: 1344.65

## 2019-08-21 ASSESSMENT — LIFESTYLE VARIABLES: TOBACCO_USE: 0

## 2019-08-21 NOTE — BRIEF OP NOTE
[facility]    Brief Operative Note    Pre-operative diagnosis: SUBMUCOSAL FIBROID  Post-operative diagnosis Submucosal fibroid  Procedure: Procedure(s):  OPERATIVE HYSTEROSCOPY WITH MYOSURE  Surgeon: Surgeon(s) and Role:     * Laura Mixon MD - Primary  Anesthesia: General   Estimated blood loss: 25ml  Fluid Deficit: 820ml  Specimens:   ID Type Source Tests Collected by Time Destination   A : Uterine Fibroid Tissue Uterus SURGICAL PATHOLOGY EXAM Laura Mixon MD 8/21/2019  7:56 AM      Findings:   Posterior left uterine fibroid .  Complications: None.  Dictation Reference Number: [id number]

## 2019-08-21 NOTE — ANESTHESIA CARE TRANSFER NOTE
Patient: Elizabeth Jerome    Procedure(s):  OPERATIVE HYSTEROSCOPY WITH MYOSURE    Diagnosis: SUBMUCOSAL FIBROID  Diagnosis Additional Information: No value filed.    Anesthesia Type:   General, LMA     Note:  Airway :Face Mask  Patient transferred to:PACU  Comments: At end of procedure, spontaneous respirations, adequate tidal volumes, followed commands to voice, LMA removed atraumatically, oropharynx suctioned, airway patent after LMA removal. Oxygen via facemask at 6 liters per minute to PACU. Oxygen tubing connected to wall O2 in PACU, SpO2, NiBP, and EKG monitors and alarms on and functioning, Abilio Hugger warmer connected to patient gown, report on patient's clinical status given to PACU RN, RN questions answered.Prior to beginning of anesthesia care in the OR, chart reviewed in EPIC, patient ID checked using 2 identifiers, written consent checked with verbalization of correct procedure by patient, patient interviewed, patient's NPO status verified, airway assessed.Handoff Report: Identifed the Patient, Identified the Reponsible Provider, Reviewed the pertinent medical history, Discussed the surgical course, Reviewed Intra-OP anesthesia mangement and issues during anesthesia, Set expectations for post-procedure period and Allowed opportunity for questions and acknowledgement of understanding      Vitals: (Last set prior to Anesthesia Care Transfer)    CRNA VITALS  8/21/2019 0737 - 8/21/2019 0812      8/21/2019             NIBP:     NIBP Mean:  100                Electronically Signed By: JEAN CARLOS Hernandez CRNA  August 21, 2019  8:12 AM

## 2019-08-21 NOTE — OP NOTE
Procedure Date: 2019      PREOPERATIVE DIAGNOSIS:  Submucosal fibroid.      POSTOPERATIVE DIAGNOSIS:  Submucosal fibroid.      PROCEDURE PERFORMED:  Operative hysteroscopy with myomectomy with the MyoSure.      PRIMARY SURGEON:  Laura Mixon MD      ANESTHESIA RECEIVED:  General anesthesia.      ESTIMATED BLOOD LOSS:  25 mL.      FLUID DEFICIT:  820 mL.      SPECIMENS:  Submucosal fibroid.      FINDINGS:  Posterior left submucosal fibroid.      COMPLICATIONS:  None.      INDICATIONS:  The patient is a 44-year-old  4, para 1-0-3-1, with a history of uterine leiomyoma, currently undergoing infertility treatment.  She has previously had a hysteroscopy.  Was bleeding heavily and the fiber was unable to be visualized at that time.  The patient presented again, requiring repeat procedure and she was treated preoperatively with progesterone.      DESCRIPTION OF PROCEDURE:  The patient was taken to the operating room, IV fluids running.  She was placed in dorsal supine position.  She was placed under general anesthesia.  She was then placed in high lithotomy and prepped and draped in the usual sterile fashion.  After final timeout, a bivalve speculum was placed into the vaginal vault and the anterior lip of the cervix was grasped with a single-tooth tenaculum.  The cervix was then sequentially dilated to 21 Thai.  The hysteroscope was then introduced with adequate distention of the endometrial cavity.  The fibroid was visualized in the posterior left endometrial wall.  Next, a MyoSure was introduced and the fibroid was subsequently resected.  Due to undergoing infertility treatments, a sharp curettage was not performed after this as the fibroid was completely resected.  The hysteroscope was then removed.  The single-tooth tenaculum was removed.  The cervix was examined.  There was no bleeding noted from the insertion sites.  All instruments were noted to be correct.  Fluid deficit was as noted, 820 mL,  and the endometrial cavity was noted to maintain distention.  Therefore, there was no concern for uterine perforation.         CAMERON SANTIAGO MD             D: 2019   T: 2019   MT: CORINNA      Name:     ZACHARY BRANHAM   MRN:      [id number]        Account:        [id number]   :      1975           Procedure Date: 2019      Document: [id number]

## 2019-08-21 NOTE — ANESTHESIA PREPROCEDURE EVALUATION
Anesthesia Pre-Procedure Evaluation    Patient: Elizabeth Jerome   MRN: [id number] : 1975          Preoperative Diagnosis: SUBMUCOSAL FIBROID    Procedure(s):  OPERATIVE HYSTEROSCOPY WITH MYOSURE    Past Medical History:   Diagnosis Date     External hemorrhoids      Hemorrhoids      Infertility, female      Leiomyoma of uterus      Skin lesion      Past Surgical History:   Procedure Laterality Date      SECTION  2003     GYN SURGERY  10/2014, 2002    Abdominal Myomectomies x 2     OPERATIVE HYSTEROSCOPY WITH MORCELLATOR N/A 2018    Procedure: OPERATIVE HYSTEROSCOPY WITH MORCELLATOR FOR MYOMECTOMY(MYOSURE);  Surgeon: Laura Mixon MD;  Location: [facility]       Anesthesia Evaluation     . Pt has had prior anesthetic.     No history of anesthetic complications          ROS/MED HX    ENT/Pulmonary:      (-) tobacco use and sleep apnea   Neurologic:      (-) seizures   Cardiovascular:        (-) FLORES   METS/Exercise Tolerance:  >4 METS   Hematologic:         Musculoskeletal:         GI/Hepatic:        (-) GERD and liver disease   Renal/Genitourinary:      (-) renal disease   Endo:      (-) Type II DM and thyroid disease   Psychiatric:         Infectious Disease:         Malignancy:         Other:                          Physical Exam  Normal systems: cardiovascular, pulmonary and dental    Airway   Mallampati: II  TM distance: >3 FB  Neck ROM: full    Dental     Cardiovascular       Pulmonary             Lab Results   Component Value Date    WBC 3.6 (L) 2017    HGB 8.0 (L) 2019    HCT 36.8 2017     2017     2019    POTASSIUM 4.5 2019    CHLORIDE 112 (H) 2019    CO2 23 2019    BUN 8 2019    CR 0.73 2019    GLC 82 2019    JANNA 8.6 2019    ALBUMIN 3.7 2019    PROTTOTAL 7.6 2019    ALT 18 2019    AST 15 2019    ALKPHOS 72 2019    BILITOTAL 0.7 2019    TSH 2.28  "08/06/2019    T4 0.91 02/28/2017       Preop Vitals  BP Readings from Last 3 Encounters:   08/21/19 117/74   08/06/19 100/70   11/28/18 122/82    Pulse Readings from Last 3 Encounters:   08/06/19 76   11/09/18 69   10/25/18 70      Resp Readings from Last 3 Encounters:   08/21/19 16   11/28/18 17    SpO2 Readings from Last 3 Encounters:   08/21/19 99%   11/28/18 100%   08/22/17 100%      Temp Readings from Last 1 Encounters:   08/21/19 36.4  C (97.6  F) (Oral)    Ht Readings from Last 1 Encounters:   08/21/19 1.613 m (5' 3.5\")      Wt Readings from Last 1 Encounters:   08/21/19 71.8 kg (158 lb 3.2 oz)    Estimated body mass index is 27.58 kg/m  as calculated from the following:    Height as of this encounter: 1.613 m (5' 3.5\").    Weight as of this encounter: 71.8 kg (158 lb 3.2 oz).       Anesthesia Plan      History & Physical Review  History and physical reviewed and following examination; no interval change.    ASA Status:  1 .    NPO Status:  > 8 hours    Plan for General and LMA with Intravenous induction. Maintenance will be Balanced.    PONV prophylaxis:  Ondansetron (or other 5HT-3) and Dexamethasone or Solumedrol       Postoperative Care  Postoperative pain management:  Multi-modal analgesia.      Consents  Anesthetic plan, risks, benefits and alternatives discussed with:  Patient and Spouse..                 Garrett Red MD  "

## 2019-08-21 NOTE — LETTER
[facility] SAME DAY SURGERY  [address]  [place] [address]  [phone]          August 21, 2019    RE:  Elizabeth Jerome                                                                                                                                                       [id number] [facility] [id number]            To whom it may concern:    Elizabeth Jerome is under my professional care for Status post hysteroscopic myomectomy She  may return to work with the following: The employee is UNABLE to return to work until 8/26/2019    When the patient returns to work, there are no restrictions      Sincerely,      Laura Mixon MD

## 2019-08-21 NOTE — DISCHARGE INSTRUCTIONS
Today you were given 975 mg of Tylenol at 0815. The recommended daily maximum dose is 4000 mg.       Same Day Surgery Discharge Instructions for  Sedation and General Anesthesia       It's not unusual to feel dizzy, light-headed or faint for up to 24 hours after surgery or while taking pain medication.  If you have these symptoms: sit for a few minutes before standing and have someone assist you when you get up to walk or use the bathroom.      You should rest and relax for the next 24 hours. We recommend you make arrangements to have an adult stay with you for at least 24 hours after your discharge.  Avoid hazardous and strenuous activity.      DO NOT DRIVE any vehicle or operate mechanical equipment for 24 hours following the end of your surgery.  Even though you may feel normal, your reactions may be affected by the medication you have received.      Do not drink alcoholic beverages for 24 hours following surgery.       Slowly progress to your regular diet as you feel able. It's not unusual to feel nauseated and/or vomit after receiving anesthesia.  If you develop these symptoms, drink clear liquids (apple juice, ginger ale, broth, 7-up, etc. ) until you feel better.  If your nausea and vomiting persists for 24 hours, please notify your surgeon.        All narcotic pain medications, along with inactivity and anesthesia, can cause constipation. Drinking plenty of liquids and increasing fiber intake will help.      For any questions of a medical nature, call your surgeon.      Do not make important decisions for 24 hours.      If you had general anesthesia, you may have a sore throat for a couple of days related to the breathing tube used during surgery.  You may use Cepacol lozenges to help with this discomfort.  If it worsens or if you develop a fever, contact your surgeon.       If you feel your pain is not well managed with the pain medications prescribed by your surgeon, please contact your surgeon's office to  let them know so they can address your concerns.       Today you received Toradol, an antiinflammatory medication similar to Ibuprofen.  You should not take other antiinflammatory medication, such as Ibuprofen, Motrin, Advil, Aleve, Naprosyn, etc until 2 pm.     **If you have questions or concerns about your procedure,   call Dr Mixon at [phone]**      HOME CARE FOLLOWING HYSTEROSCOPY    Diet  You have no restrictions on your diet.  During the evening following surgery, drink plenty of fluids and eat a light supper.    Nausea  The anesthesia may produce some nausea.  If you feel nauseated, stay in bed and try drinking fluids such as 7-Up, tea, or soup.    Discomfort  The amount of discomfort you can expect is very unpredictable.  If you have pain that cannot be controlled with Tylenol or with the prescription you may have received, you should notify your physician.  Abdominal cramping or low backache is not uncommon and should not be a cause for concern.  You will be drowsy and weak the day of surgery and possibly the following day.  Fever  A low grade fever (not over 100 F) is usual after this procedure.  Do not hesitate to notify your physician if your fever seems excessive or persists.    Activity   You may resume your normal activity.  Avoid heavy lifting for one week.  You may bathe or shower.  Do not douche, use tampons, or resume intercourse until the bleeding has ceased.    Emergency Care  Contact your physician if you have any of these problems:   1.  A fever over 100 F   2.  A large amount of bleeding or drainage   3.  Severe pain

## 2019-08-21 NOTE — ANESTHESIA POSTPROCEDURE EVALUATION
Patient: Elizabeth Jerome    Procedure(s):  OPERATIVE HYSTEROSCOPY WITH MYOSURE    Diagnosis:SUBMUCOSAL FIBROID  Diagnosis Additional Information: No value filed.    Anesthesia Type:  General, LMA    Note:  Anesthesia Post Evaluation    Patient location during evaluation: PACU  Patient participation: Able to fully participate in evaluation  Level of consciousness: awake and alert  Pain management: satisfactory to patient  Airway patency: patent  Cardiovascular status: hemodynamically stable  Respiratory status: acceptable and unassisted  Hydration status: balanced  PONV: none     Anesthetic complications: None          Last vitals:  Vitals:    08/21/19 0900 08/21/19 0915 08/21/19 0930   BP: 127/78 124/76    Pulse: 78 72    Resp: 17 19    Temp: 36.4  C (97.5  F) 36.5  C (97.7  F)    SpO2: 100% 100% 96%         Electronically Signed By: Garrett Red MD  August 21, 2019  1:25 PM

## 2019-08-22 LAB — COPATH REPORT: NORMAL

## 2019-09-03 ENCOUNTER — TELEPHONE (OUTPATIENT)
Dept: OBGYN | Facility: CLINIC | Age: 44
End: 2019-09-03

## 2019-09-03 NOTE — TELEPHONE ENCOUNTER
Pt informed. Should go to ED if bleeding becomes heavier or has dizziness, SOB or feeling light headed. Transferred to schedule to make appointment.

## 2019-09-03 NOTE — TELEPHONE ENCOUNTER
8/21/19 Hysteroscopy with myosure with Dr Oral LUBIN to inquire  Stacy Macedo RN on 9/3/2019 at 3:04 PM

## 2019-09-03 NOTE — TELEPHONE ENCOUNTER
Pt had surgery on the 21st of August and has been non stop bleeding (not spotting) since and she is wondering if this is normal or not.

## 2019-09-03 NOTE — TELEPHONE ENCOUNTER
8/21/19 Operative hysteroscopy with myomectomy with the MyoSure. Pt is having bright red bleeding. Blood will run down her leg on occasion. Soaks one pad per day.Woke last night in pool of blood. Pool was about the size of a dinner plate. Not passing any clots. No pain or cramping. Wed and Thursday had only spotting. Bleeding started on Friday and has continued through today. No dizziness, SOB or light headed feeling. Routing to Dr. Mixon. Please review.

## 2019-09-04 ENCOUNTER — OFFICE VISIT (OUTPATIENT)
Dept: OBGYN | Facility: CLINIC | Age: 44
End: 2019-09-04
Payer: COMMERCIAL

## 2019-09-04 VITALS
WEIGHT: 158 LBS | SYSTOLIC BLOOD PRESSURE: 108 MMHG | BODY MASS INDEX: 26.98 KG/M2 | DIASTOLIC BLOOD PRESSURE: 66 MMHG | HEART RATE: 68 BPM | HEIGHT: 64 IN

## 2019-09-04 DIAGNOSIS — N93.8 DUB (DYSFUNCTIONAL UTERINE BLEEDING): Primary | ICD-10-CM

## 2019-09-04 DIAGNOSIS — N92.0 MENORRHAGIA WITH REGULAR CYCLE: ICD-10-CM

## 2019-09-04 LAB — HGB BLD-MCNC: 11.2 G/DL (ref 11.7–15.7)

## 2019-09-04 PROCEDURE — 36415 COLL VENOUS BLD VENIPUNCTURE: CPT | Performed by: OBSTETRICS & GYNECOLOGY

## 2019-09-04 PROCEDURE — 99214 OFFICE O/P EST MOD 30 MIN: CPT | Performed by: OBSTETRICS & GYNECOLOGY

## 2019-09-04 PROCEDURE — 85018 HEMOGLOBIN: CPT | Performed by: OBSTETRICS & GYNECOLOGY

## 2019-09-04 RX ORDER — TRANEXAMIC ACID 650 MG/1
1300 TABLET ORAL 3 TIMES DAILY
Qty: 30 TABLET | Refills: 0 | Status: SHIPPED | OUTPATIENT
Start: 2019-09-04 | End: 2020-02-11

## 2019-09-04 ASSESSMENT — MIFFLIN-ST. JEOR: SCORE: 1343.74

## 2019-09-04 NOTE — PROGRESS NOTES
SUBJECTIVE:                                                   Elizabeth Jerome is a 44 year old female who presents to clinic today for the following health issue(s):  Patient presents with:  Surgical Followup: c/o heavy bleeding since Friday- had hysteroscopy with myosure on 19; denies SOB, lightheadedness and dizziness        HPI:  Patient had recent hysteroscopic myomectomy on  with Dr Banda   Her hgb prior to surgery was 8.0  She is on oral iron  Was on Provera prior to her procedure  Called yesterday and said she had heavy bleeding   It runs down her legs, soaked bed  hgb recheck today was much better at 11.2, was not transfused  Patient says she started having heavy bleeding about 3 or 4 days after her surgery  Stopped provera at time of surgery  Has other fibroids present      Patient's last menstrual period was 2019..   Patient is not sexually active, .  Using not sexually active for contraception.    reports that she has never smoked. She has never used smokeless tobacco.    STD testing offered?  Declined  Health maintenance updated:  yes    Today's PHQ-2 Score:   PHQ-2 (  [employer]) 3/7/2016   Q1: Little interest or pleasure in doing things 0   Q2: Feeling down, depressed or hopeless 0   PHQ-2 Score 0     Today's PHQ-9 Score:   PHQ-9 SCORE 2019   PHQ-9 Total Score 5     Today's MILEY-7 Score:   MILEY-7 SCORE 2019   Total Score 0       Problem list and histories reviewed & adjusted, as indicated.  Additional history: as documented.    Patient Active Problem List   Diagnosis     Intramural leiomyoma of uterus     Past Surgical History:   Procedure Laterality Date      SECTION  2003     GYN SURGERY  10/2014, 2002    Abdominal Myomectomies x 2     OPERATIVE HYSTEROSCOPY WITH MORCELLATOR N/A 2018    Procedure: OPERATIVE HYSTEROSCOPY WITH MORCELLATOR FOR MYOMECTOMY(MYOSURE);  Surgeon: Laura Mixon MD;  Location: [facility]     OPERATIVE  "HYSTEROSCOPY WITH MORCELLATOR N/A 8/21/2019    Procedure: OPERATIVE HYSTEROSCOPY WITH MYOSURE;  Surgeon: Laura Mixon MD;  Location:  OR      Social History     Tobacco Use     Smoking status: Never Smoker     Smokeless tobacco: Never Used   Substance Use Topics     Alcohol use: Yes     Alcohol/week: 0.0 oz     Comment: rare      Problem (# of Occurrences) Relation (Name,Age of Onset)    Diabetes (1) Maternal Grandmother    Hyperlipidemia (2) Mother, Brother    Hypertension (2) Mother, Father            Current Outpatient Medications   Medication Sig     Ferrous Sulfate (IRON SUPPLEMENT PO) Take 325 mg by mouth daily      Prenatal Vit-Fe Fumarate-FA (PRENATAL PO)      No current facility-administered medications for this visit.      No Known Allergies    ROS:  12 point review of systems negative other than symptoms noted below.  Genitourinary: Abnormal uterine bleeding    OBJECTIVE:     /66   Pulse 68   Ht 1.613 m (5' 3.5\")   Wt 71.7 kg (158 lb)   LMP 08/08/2019   BMI 27.55 kg/m    Body mass index is 27.55 kg/m .    Exam:  Constitutional:  Appearance: Well nourished, well developed alert, in no acute distress  Chest:  Respiratory Effort:  Breathing unlabored  Neurologic/Psychiatric:  Mental Status:  Oriented X3   Pelvic Exam:  External Genitalia:     Normal appearance for age, no discharge present, no tenderness present, no inflammatory lesions present, color normal  Vagina:     Normal vaginal vault without central or paravaginal defects, no discharge present, no inflammatory lesions present, no masses present  Bladder:     Nontender to palpation  Urethra:   Urethral Body:  Urethra palpation normal, urethra structural support normal   Urethral Meatus:  No erythema or lesions present  Cervix:     Appearance healthy, no lesions present, nontender to palpation, having heavy period like bleeding  Uterus:     Uterus: firm, 10 week size, irregular and nontender, anteverted in position. "   Adnexa:     No adnexal tenderness present, no adnexal masses present  Perineum:     Perineum within normal limits, no evidence of trauma, no rashes or skin lesions present  Anus:     Anus within normal limits, no hemorrhoids present  Inguinal Lymph Nodes:     No lymphadenopathy present  Pubic Hair:     Normal pubic hair distribution for age  Genitalia and Groin:     No rashes present, no lesions present, no areas of discoloration, no masses present       In-Clinic Test Results:  No results found for this or any previous visit (from the past 24 hour(s)).    ASSESSMENT/PLAN:                                                        ICD-10-CM    1. DUB (dysfunctional uterine bleeding) N93.8 Hemoglobin       Since her hgb is much improved we will try lysteda hoping we can slow down her bleeding  Her bleeding is too heavy to just observe in my opinion   Max recommended dose is 2 tabs tid for up to 5 days  Told patient to continue her iron bid  Has appt with Dr Malin next week  Normal kidney fct noted on recent lab  Patient still has a fibroid uterus and stopped provera so both may be increasing her bleeding in addition to recent myomectomy  Reviewed prior ultrasound reports and labs today  Face to face 25 minute, greater than 50% counceling        Sherrie Davalos MD  [facility] [place]

## 2019-09-09 ENCOUNTER — OFFICE VISIT (OUTPATIENT)
Dept: OBGYN | Facility: CLINIC | Age: 44
End: 2019-09-09
Payer: COMMERCIAL

## 2019-09-09 ENCOUNTER — TELEPHONE (OUTPATIENT)
Dept: OBGYN | Facility: CLINIC | Age: 44
End: 2019-09-09

## 2019-09-09 VITALS — WEIGHT: 159 LBS | DIASTOLIC BLOOD PRESSURE: 78 MMHG | SYSTOLIC BLOOD PRESSURE: 122 MMHG | BODY MASS INDEX: 27.72 KG/M2

## 2019-09-09 DIAGNOSIS — D25.1 INTRAMURAL AND SUBMUCOUS LEIOMYOMA OF UTERUS: ICD-10-CM

## 2019-09-09 DIAGNOSIS — D25.0 INTRAMURAL AND SUBMUCOUS LEIOMYOMA OF UTERUS: ICD-10-CM

## 2019-09-09 DIAGNOSIS — N93.9 ABNORMAL UTERINE BLEEDING (AUB): Primary | ICD-10-CM

## 2019-09-09 LAB — HGB BLD-MCNC: 10.2 G/DL (ref 11.7–15.7)

## 2019-09-09 PROCEDURE — 99213 OFFICE O/P EST LOW 20 MIN: CPT | Performed by: OBSTETRICS & GYNECOLOGY

## 2019-09-09 PROCEDURE — 85018 HEMOGLOBIN: CPT | Performed by: OBSTETRICS & GYNECOLOGY

## 2019-09-09 PROCEDURE — 36415 COLL VENOUS BLD VENIPUNCTURE: CPT | Performed by: OBSTETRICS & GYNECOLOGY

## 2019-09-09 NOTE — TELEPHONE ENCOUNTER
OV with ME this morning Post-op Visit: 8/21 OPERATIVE HYSTEROSCOPY WITH MYOSURE  Pt requesting records be sent to:  CRM provider - Dr. Warren for review    Sent OV notes from 8/6, 9/4 and 9/9 and procedure summary from 8/21/19 to CRM per pt request and continued care for infertility.    Pt verbalized understanding, in agreement with plan, and voiced no further questions.  Stacy Macedo RN on 9/9/2019 at 2:24 PM

## 2020-02-06 NOTE — PROGRESS NOTES
SUBJECTIVE:                                                   Elizabeth Jerome is a 44 year old female who presents to clinic today for the following health issue(s):  Patient presents with:  Vaginal Discharge: Patient states she's been having clear vaginal discharge since her last procedure which was in 2019. No odor and no itching.         HPI:  Elizabeth presents with sinus congestion and yellow colored drainage for the past 2 weeks. She has since had a repeat sonogram with IVF that showed that the submucosal fibroid was completely resected. She had one embryo transfer with donor eggs that was not successful and she is considering another transfer with two embryos.  She also states that her menses have not improved since the myomectomy and they continue to be heavy. They have been heavy all of her life. She is still taking prenatal vitamins and oral iron.  She has also been having intermittent watery vaginal discharge that is clear that comes and goes. It is not urinary incontinence. The discharge has no odor and there is no pruritus.    Patient's last menstrual period was 2020 (exact date)..     Patient is not sexually active, .  Using none for contraception.    reports that she has never smoked. She has never used smokeless tobacco.    STD testing offered?  Declined    Health maintenance updated:  yes    Today's PHQ-2 Score:   PHQ-2 (  [employer]) 3/7/2016   Q1: Little interest or pleasure in doing things 0   Q2: Feeling down, depressed or hopeless 0   PHQ-2 Score 0     Today's PHQ-9 Score:   PHQ-9 SCORE 2019   PHQ-9 Total Score 5     Today's MILEY-7 Score:   MILEY-7 SCORE 2019   Total Score 0       Problem list and histories reviewed & adjusted, as indicated.  Additional history: as documented.    Patient Active Problem List   Diagnosis     Intramural leiomyoma of uterus     Past Surgical History:   Procedure Laterality Date      SECTION  2003     GYN SURGERY  10/2014,  "08/08/2002    Abdominal Myomectomies x 2     OPERATIVE HYSTEROSCOPY WITH MORCELLATOR N/A 11/28/2018    Procedure: OPERATIVE HYSTEROSCOPY WITH MORCELLATOR FOR MYOMECTOMY(MYOSURE);  Surgeon: Laura Mixon MD;  Location: [facility]     OPERATIVE HYSTEROSCOPY WITH MORCELLATOR N/A 8/21/2019    Procedure: OPERATIVE HYSTEROSCOPY WITH MYOSURE;  Surgeon: Laura Mixon MD;  Location:  OR      Social History     Tobacco Use     Smoking status: Never Smoker     Smokeless tobacco: Never Used   Substance Use Topics     Alcohol use: Yes     Alcohol/week: 0.0 standard drinks     Comment: rare      Problem (# of Occurrences) Relation (Name,Age of Onset)    Diabetes (1) Maternal Grandmother    Hyperlipidemia (2) Mother, Brother    Hypertension (2) Mother, Father    No Known Problems (4) Sister, Maternal Grandfather, Paternal Grandmother, Other            Current Outpatient Medications   Medication Sig     amoxicillin-clavulanate (AUGMENTIN) 875-125 MG tablet Take 1 tablet by mouth 2 times daily for 7 days     Ferrous Sulfate (IRON SUPPLEMENT PO) Take 325 mg by mouth daily      Prenatal Vit-Fe Fumarate-FA (PRENATAL PO)      No current facility-administered medications for this visit.      No Known Allergies    ROS:  12 point review of systems negative other than symptoms noted below or in the HPI.  Head: Nasal Congestion  Genitourinary: Vaginal Discharge  No urinary frequency or dysuria, bladder or kidney problems      OBJECTIVE:     /76 (BP Location: Right arm, Patient Position: Sitting, Cuff Size: Adult Regular)   Pulse 76   Ht 1.613 m (5' 3.5\")   Wt 75.3 kg (166 lb)   LMP 02/08/2020 (Exact Date)   Breastfeeding No   BMI 28.94 kg/m    Body mass index is 28.94 kg/m .    Exam:  Constitutional:  Appearance: Well nourished, well developed alert, in no acute distress  Skin: General Inspection:  No rashes present, no lesions present, no areas of discoloration.  Neurologic:  Mental Status:  Oriented " X3.  Normal strength and tone, sensory exam grossly normal, mentation intact and speech normal.    Psychiatric:  Mentation appears normal and affect normal/bright.  Pelvic Exam:  External Genitalia:     Normal appearance for age, no discharge present, no tenderness present, no inflammatory lesions present, color normal  Vagina:     Menses in the vault without any other discharge appreciated  Cervix:     Appearance healthy, no lesions present, nontender to palpation, ongoing menses present  Genitalia and Groin:     No rashes present, no lesions present, no areas of discoloration, no masses present       In-Clinic Test Results:  Results for orders placed or performed in visit on 02/11/20 (from the past 24 hour(s))   Hemoglobin   Result Value Ref Range    Hemoglobin 11.1 (L) 11.7 - 15.7 g/dL       ASSESSMENT/PLAN:                                                        ICD-10-CM    1. Acute non-recurrent sinusitis of other sinus J01.80 amoxicillin-clavulanate (AUGMENTIN) 875-125 MG tablet   2. Menorrhagia with regular cycle N92.0 Hemoglobin     Ferritin     I recommend oral Augmentin for her sinusitis  Elizabeth was advised to continue to take her oral iron but not at the same time as the prenatal vitamin.  She was counseled that the increased risk from her IVF is mostly due to the increased risk of preeclampsia for which she already has a baseline increased risk due to her race and age. IVF with donor egg further increases this risk and a multiple gestation will further increase this risk as well. She was counseled that at this point my biggest concern would not be about uterine rupture from a twin pregnancy but her cardiovascular health during and after the pregnancy.     Laura Mixon MD  [facility] [place]

## 2020-02-11 ENCOUNTER — OFFICE VISIT (OUTPATIENT)
Dept: OBGYN | Facility: CLINIC | Age: 45
End: 2020-02-11
Payer: COMMERCIAL

## 2020-02-11 VITALS
WEIGHT: 166 LBS | HEIGHT: 64 IN | DIASTOLIC BLOOD PRESSURE: 76 MMHG | SYSTOLIC BLOOD PRESSURE: 112 MMHG | BODY MASS INDEX: 28.34 KG/M2 | HEART RATE: 76 BPM

## 2020-02-11 DIAGNOSIS — J01.80 ACUTE NON-RECURRENT SINUSITIS OF OTHER SINUS: Primary | ICD-10-CM

## 2020-02-11 DIAGNOSIS — N92.0 MENORRHAGIA WITH REGULAR CYCLE: ICD-10-CM

## 2020-02-11 LAB
FERRITIN SERPL-MCNC: 22 NG/ML (ref 12–150)
HGB BLD-MCNC: 11.1 G/DL (ref 11.7–15.7)

## 2020-02-11 PROCEDURE — 99213 OFFICE O/P EST LOW 20 MIN: CPT | Performed by: OBSTETRICS & GYNECOLOGY

## 2020-02-11 PROCEDURE — 36415 COLL VENOUS BLD VENIPUNCTURE: CPT | Performed by: OBSTETRICS & GYNECOLOGY

## 2020-02-11 PROCEDURE — 82728 ASSAY OF FERRITIN: CPT | Performed by: OBSTETRICS & GYNECOLOGY

## 2020-02-11 PROCEDURE — 85018 HEMOGLOBIN: CPT | Performed by: OBSTETRICS & GYNECOLOGY

## 2020-02-11 ASSESSMENT — MIFFLIN-ST. JEOR: SCORE: 1380.03

## 2020-07-20 NOTE — PROGRESS NOTES
SUBJECTIVE:                                                   Elizabeth Jerome is a 45 year old female who presents to clinic today for the following health issue(s):  Patient presents with:  Pelvic Pain      HPI:  Elizabeth had IVF transfer with donor eggs that did not take. No submucosal fibroids were seen but it was suggested that maybe she has some scarring from her two hysteroscopies. No sharp curettage were performed by myself at either times so this possibility is minimal. She is considering using her sister in law as a gestational surrogate as the cost would be prohibitive in the [place].    No LMP recorded. (Menstrual status: Irregular Periods)..   Patient is sexually active, .  Using nothing for contraception.    reports that she has never smoked. She has never used smokeless tobacco.  STD testing offered?  Declined  Health maintenance updated:  yes    Today's PHQ-2 Score:   PHQ-2 (  [employer]) 3/7/2016   Q1: Little interest or pleasure in doing things 0   Q2: Feeling down, depressed or hopeless 0   PHQ-2 Score 0     Today's PHQ-9 Score:   PHQ-9 SCORE 2019   PHQ-9 Total Score 5     Today's MILEY-7 Score:   MILEY-7 SCORE 2019   Total Score 0       Problem list and histories reviewed & adjusted, as indicated.  Additional history: as documented.    Patient Active Problem List   Diagnosis     Intramural leiomyoma of uterus     Past Surgical History:   Procedure Laterality Date      SECTION  2003     GYN SURGERY  10/2014, 2002    Abdominal Myomectomies x 2     OPERATIVE HYSTEROSCOPY WITH MORCELLATOR N/A 2018    Procedure: OPERATIVE HYSTEROSCOPY WITH MORCELLATOR FOR MYOMECTOMY(MYOSURE);  Surgeon: Laura Mixon MD;  Location: [facility]     OPERATIVE HYSTEROSCOPY WITH MORCELLATOR N/A 2019    Procedure: OPERATIVE HYSTEROSCOPY WITH MYOSURE;  Surgeon: Laura Mixon MD;  Location:  OR      Social History     Tobacco Use     Smoking status: Never Smoker  "    Smokeless tobacco: Never Used   Substance Use Topics     Alcohol use: Yes     Alcohol/week: 0.0 standard drinks     Comment: rare      Problem (# of Occurrences) Relation (Name,Age of Onset)    Diabetes (1) Maternal Grandmother    Hyperlipidemia (2) Mother, Brother    Hypertension (2) Mother, Father    No Known Problems (4) Sister, Maternal Grandfather, Paternal Grandmother, Other            Current Outpatient Medications   Medication Sig     Ferrous Sulfate (IRON SUPPLEMENT PO) Take 325 mg by mouth daily      tranexamic acid (LYSTEDA) 650 MG tablet Take 2 tablets (1,300 mg) by mouth 2 times daily for 5 days     No current facility-administered medications for this visit.      No Known Allergies    ROS:  12 point review of systems negative other than symptoms noted below or in the HPI.  Genitourinary: Cramps, Heavy Bleeding with Period and Pelvic Pain  No urinary frequency or dysuria, bladder or kidney problems      OBJECTIVE:     /82   Ht 1.613 m (5' 3.5\")   Wt 76.2 kg (168 lb)   BMI 29.29 kg/m    Body mass index is 29.29 kg/m .    Exam:  Constitutional:  Appearance: Well nourished, well developed alert, in no acute distress  Gastrointestinal:  Abdominal Examination:  Abdomen nontender to palpation, tone normal without rigidity or guarding, no masses present, umbilicus without lesions; Liver/Spleen:  No hepatomegaly present, liver nontender to palpation; Hernias:  No hernias present  Lymphatic: Lymph Nodes:  No other lymphadenopathy present  Skin: General Inspection:  No rashes present, no lesions present, no areas of discoloration.  Neurologic:  Mental Status:  Oriented X3.  Normal strength and tone, sensory exam grossly normal, mentation intact and speech normal.    Psychiatric:  Mentation appears normal and affect normal/bright.  Pelvic Exam:  External Genitalia:     Normal appearance for age, no discharge present, no tenderness present, no inflammatory lesions present, color normal  Vagina:  "    Non-tender  Bladder:     Nontender to palpation  Urethra:   Urethral Body:  Urethra palpation normal, urethra structural support normal   Urethral Meatus:  No erythema or lesions present  Cervix:     Non-tender to palpation  Uterus:     Uterus: enlarged and firm to 12 week size.  Adnexa:     No adnexal tenderness present, no adnexal masses present  Perineum:     Perineum within normal limits, no evidence of trauma, no rashes or skin lesions present    Genitalia and Groin:     No rashes present, no lesions present, no areas of discoloration, no masses present       In-Clinic Test Results:  No results found for this or any previous visit (from the past 24 hour(s)).    ASSESSMENT/PLAN:                                                        ICD-10-CM    1. Abnormal uterine bleeding (AUB)  N93.9 tranexamic acid (LYSTEDA) 650 MG tablet   2. Intramural leiomyoma of uterus  D25.1      We discussed management options for uterine fibroids and painful periods. Her severe pain at time of menses is due to heavy vaginal bleeding. We discussed the intrauterine device, endometrial ablation, uterine fibroid embolization and definitive treatment. At this time she would like to discuss further options with her . I also offered an in office diagnostic hysteroscopy to check for endometrial scarring. She will decide and let me know. A PCP referral was given as well.     Laura Mixon MD  [facility] [place]

## 2020-07-21 ENCOUNTER — OFFICE VISIT (OUTPATIENT)
Dept: OBGYN | Facility: CLINIC | Age: 45
End: 2020-07-21
Payer: COMMERCIAL

## 2020-07-21 VITALS
SYSTOLIC BLOOD PRESSURE: 122 MMHG | BODY MASS INDEX: 28.68 KG/M2 | DIASTOLIC BLOOD PRESSURE: 82 MMHG | WEIGHT: 168 LBS | HEIGHT: 64 IN

## 2020-07-21 DIAGNOSIS — D25.1 INTRAMURAL LEIOMYOMA OF UTERUS: ICD-10-CM

## 2020-07-21 DIAGNOSIS — N93.9 ABNORMAL UTERINE BLEEDING (AUB): Primary | ICD-10-CM

## 2020-07-21 PROCEDURE — 99213 OFFICE O/P EST LOW 20 MIN: CPT | Performed by: OBSTETRICS & GYNECOLOGY

## 2020-07-21 RX ORDER — TRANEXAMIC ACID 650 MG/1
1300 TABLET ORAL 2 TIMES DAILY
Qty: 20 TABLET | Refills: 11 | Status: SHIPPED | OUTPATIENT
Start: 2020-07-21 | End: 2020-07-26

## 2020-07-21 ASSESSMENT — MIFFLIN-ST. JEOR: SCORE: 1384.1

## 2020-09-09 ENCOUNTER — TELEPHONE (OUTPATIENT)
Dept: OBGYN | Facility: CLINIC | Age: 45
End: 2020-09-09

## 2020-09-09 NOTE — TELEPHONE ENCOUNTER
Patient is calling today to ask about a side effect she is having from a medication she takes for bleeding. Please call her back today.

## 2020-09-09 NOTE — TELEPHONE ENCOUNTER
Tranexamic acid will not change the frequency of her cycles. Cycle frequency can change with time and with age.

## 2020-09-09 NOTE — TELEPHONE ENCOUNTER
Taking her Lysteda bleeding is much better  Cycle was every 27-28 days   Now since being on Lysteda cycle is approx every 21 days   Wondering if any concerns with change in cycle would like Dr. Mixon's recommendations.  Katie Samayoa RN on 9/9/2020 at 10:12 AM

## 2020-10-29 ENCOUNTER — TELEPHONE (OUTPATIENT)
Dept: OBGYN | Facility: CLINIC | Age: 45
End: 2020-10-29

## 2020-10-29 NOTE — TELEPHONE ENCOUNTER
Please call Elizabeth to get more information as she is an infertility patient who may be using a surrogate. The [place] will diminish her ovarian reserve

## 2020-10-29 NOTE — TELEPHONE ENCOUNTER
Pt called wanting to leave a message for Dr. Mixon about deciding to go forward with having the uterine fibroid embolization and would like the referral to be placed for this. Please contact pt back once placed

## 2020-10-30 NOTE — TELEPHONE ENCOUNTER
Discussed below recommendations per Dr. Mixon. Patient is still unsure what she really wants to do. Still experiencing pain and bleeding and wanting to do something for this. Is aware of previous options per . Is hoping to find a surrogate however is not sure if she can afford it. States she is really torn between what she wants to do. Dsicussed that she really should make a follow up appointment to discuss with . transferred to scheduling to make an appt.    Griselda England RN on 10/30/2020 at 12:10 PM

## 2020-11-13 NOTE — PROGRESS NOTES
SUBJECTIVE:                                                   Elizabeth Jerome is a 45 year old female who presents to clinic today for the following health issue(s):  Patient presents with:  Abnormal Bleeding Problem: patient's last cycle last for 2 weeks. Has previously discussed treatments for this.      HPI:  Elizabeth presents with worsening pelvic pain and lower back pain with her menses. The menses continue to be heavy. She has the worst pain on the second day and it can last from 1 to 3 hours to the point of tears and is relieved only with heavier bleeding and passage of clots. She has had two prior abdominal myomectomies. The first was in [facility] and the second was after her son was born. She has also had a hysteroscopic removal of the submucosal fibroid. She would like to discuss treatment options. She is currently pursuing surrogacy with her sister in law in [facility]. She has frozen embryos with donor egg that she is interested in sending there for a frozen transfer. She is interested in [place] for relief of the pelvic pain. She does not want another abdominal myomectomy and she is not ready to consider a hysterectomy at this time.      No LMP recorded. (Menstrual status: Irregular Periods)..   Patient is sexually active, .  Using nothing for contraception.    reports that she has never smoked. She has never used smokeless tobacco.  Health maintenance updated:  yes    Today's PHQ-2 Score:   PHQ-2 (  [employer]) 3/7/2016   Q1: Little interest or pleasure in doing things 0   Q2: Feeling down, depressed or hopeless 0   PHQ-2 Score 0     Today's PHQ-9 Score:   PHQ-9 SCORE 2019   PHQ-9 Total Score 5     Today's MILEY-7 Score:   MILEY-7 SCORE 2019   Total Score 0       Problem list and histories reviewed & adjusted, as indicated.  Additional history: as documented.    Patient Active Problem List   Diagnosis     Intramural leiomyoma of uterus     Past Surgical History:   Procedure Laterality Date       "SECTION  08/07/2003     GYN SURGERY  10/2014, 08/08/2002    Abdominal Myomectomies x 2     OPERATIVE HYSTEROSCOPY WITH MORCELLATOR N/A 11/28/2018    Procedure: OPERATIVE HYSTEROSCOPY WITH MORCELLATOR FOR MYOMECTOMY(MYOSURE);  Surgeon: Laura Mixon MD;  Location: [facility]     OPERATIVE HYSTEROSCOPY WITH MORCELLATOR N/A 8/21/2019    Procedure: OPERATIVE HYSTEROSCOPY WITH MYOSURE;  Surgeon: Laura Mixon MD;  Location:  OR      Social History     Tobacco Use     Smoking status: Never Smoker     Smokeless tobacco: Never Used   Substance Use Topics     Alcohol use: Yes     Alcohol/week: 0.0 standard drinks     Comment: rare      Problem (# of Occurrences) Relation (Name,Age of Onset)    Diabetes (1) Maternal Grandmother    Hyperlipidemia (2) Mother, Brother    Hypertension (2) Mother, Father    No Known Problems (4) Sister, Maternal Grandfather, Paternal Grandmother, Other            Current Outpatient Medications   Medication Sig     levonorgestrel (MIRENA) 20 MCG/24HR IUD 1 each (20 mcg) by Intrauterine route once     Ferrous Sulfate (IRON SUPPLEMENT PO) Take 325 mg by mouth daily      Current Facility-Administered Medications   Medication     levonorgestrel (MIRENA) 20 MCG/24HR IUD 20 mcg     No Known Allergies    ROS:  12 point review of systems negative other than symptoms noted below or in the HPI.  Genitourinary: Heavy Bleeding with Period and Irregular Menses  No urinary frequency or dysuria, bladder or kidney problems      OBJECTIVE:     /70   Ht 1.613 m (5' 3.5\")   Wt 74.8 kg (165 lb)   BMI 28.77 kg/m    Body mass index is 28.77 kg/m .    Exam:  Constitutional:  Appearance: Well nourished, well developed alert, in no acute distress  Gastrointestinal:  Abdominal Examination:  Abdomen nontender to palpation, tone normal without rigidity or guarding, no masses present, umbilicus without lesions; Liver/Spleen:  No hepatomegaly present, liver nontender to palpation; Hernias:  " No hernias present  Skin: General Inspection:  No rashes present, no lesions present, no areas of discoloration.  Neurologic:  Mental Status:  Oriented X3.  Normal strength and tone, sensory exam grossly normal, mentation intact and speech normal.    Psychiatric:  Mentation appears normal and affect normal/bright.  Pelvic Exam:  External Genitalia:     Normal appearance for age, no discharge present, no tenderness present, no inflammatory lesions present, color normal  Vagina:     Normal vaginal vault without central or paravaginal defects, no discharge present, no inflammatory lesions present, no masses present  Bladder:     Nontender to palpation  Urethra:   Urethral Body:  Urethra palpation normal, urethra structural support normal   Urethral Meatus:  No erythema or lesions present  Cervix:     Appearance healthy, no lesions present, nontender to palpation, no bleeding present  Uterus:     Uterus: firm, enlarged and nontender, anteverted in position.   Adnexa:     No adnexal tenderness present, no adnexal masses present    Genitalia and Groin:     No rashes present, no lesions present, no areas of discoloration, no masses present       In-Clinic Test Results:  No results found for this or any previous visit (from the past 24 hour(s)).    ASSESSMENT/PLAN:                                                        ICD-10-CM    1. Encounter for insertion of intrauterine contraceptive device  Z30.430 levonorgestrel (MIRENA) 20 MCG/24HR IUD     levonorgestrel (MIRENA) 20 MCG/24HR IUD 20 mcg     INSERTION INTRAUTERINE DEVICE   2. Dysmenorrhea  N94.6    3. Intramural leiomyoma of uterus  D25.1      We discussed management options. She and her  are not ready to decrease her ovarian reserve until her sister in law is pregnant as their gestational carrier. She was counseled about OCPs, the Nuva Ring, Nexplanon or a Mirena IUD. She opted for the Intrauterine device and it was placed as described below.     Laura MARIE  "MD Oral  [facility] [place]      IUD Insertion:    Subjective: Elizabeth Jerome is a 45 year old  presents for IUD and desires Mirena type IUD.    Patient has been given the opportunity to ask questions about all forms of birth control, including all options appropriate for Elizabeth Jerome. Discussed that no method of birth control, except abstinence is 100% effective against pregnancy or sexually transmitted infection.     Elizabeth Jerome understands she may have the IUD removed at any time. IUD should be removed by a health care provider.    The entire insertion procedure was reviewed with the patient, including care after placement.    No LMP recorded. (Menstrual status: Irregular Periods). Last sexual activity: prior to last menses. No allergy to betadine or shellfish. Patient declines STD screening  No results found for: HCG      /70   Ht 1.613 m (5' 3.5\")   Wt 74.8 kg (165 lb)   BMI 28.77 kg/m      Pelvic Exam:   EG/BUS: normal genital architecture without lesions, erythema or abnormal secretions.   Vagina: moist, pink, rugae with physiologic discharge and secretions  Cervix: nulliparous no lesions and pink, moist, closed, without lesion or CMT  Uterus: anteverted position, mobile, no pain  Adnexa: within normal limits and no masses, nodularity, tenderness    PROCEDURE NOTE: -- IUD Insertion    Reason for Insertion: abnormal uterine bleeding    no premedication  Under sterile technique, cervix was visualized with speculum and prepped with Betadine solution swab x 3. Tenaculum was placed for stability. The uterus was gently straightened and sounded to 7.0 cm. IUD prepared for placement, and IUD inserted according to 's instructions without difficulty or significant resitance, and deployed at the fundus. The strings were visualized and trimmed to 3.0 cm from the external os. Tenaculum was removed and hemostasis noted. Speculum removed.  Patient tolerated " procedure well.    Lot # [id number]  NDC: 36025-145-91  Exp: 12/2022    EBL: minimal    Complications: none    ASSESSMENT:     ICD-10-CM    1. Encounter for insertion of intrauterine contraceptive device  Z30.430 levonorgestrel (MIRENA) 20 MCG/24HR IUD     levonorgestrel (MIRENA) 20 MCG/24HR IUD 20 mcg     INSERTION INTRAUTERINE DEVICE   2. Dysmenorrhea  N94.6    3. Intramural leiomyoma of uterus  D25.1         PLAN:    Given 's handouts, including when to have IUD removed, list of danger s/sx, side effects and follow up recommended. Encouraged condom use for prevention of STD. Back up contraception advised for 7 days if progestin method. Advised to call for any fever, for prolonged or severe pain or bleeding, abnormal vaginal discharge, or unable to palpate strings. She was advised to use pain medications (ibuprofen) as needed for mild to moderate pain. Advised to follow-up in clinic in 4-6 weeks for IUD string check if unable to find strings or as directed by provider.    Plan to perform pap smear at the IUD check appointment.    Laura Mixon MD

## 2020-11-16 ENCOUNTER — OFFICE VISIT (OUTPATIENT)
Dept: OBGYN | Facility: CLINIC | Age: 45
End: 2020-11-16
Payer: COMMERCIAL

## 2020-11-16 VITALS
HEIGHT: 64 IN | DIASTOLIC BLOOD PRESSURE: 70 MMHG | SYSTOLIC BLOOD PRESSURE: 112 MMHG | BODY MASS INDEX: 28.17 KG/M2 | WEIGHT: 165 LBS

## 2020-11-16 DIAGNOSIS — D25.1 INTRAMURAL LEIOMYOMA OF UTERUS: ICD-10-CM

## 2020-11-16 DIAGNOSIS — N94.6 DYSMENORRHEA: ICD-10-CM

## 2020-11-16 DIAGNOSIS — Z30.430 ENCOUNTER FOR INSERTION OF INTRAUTERINE CONTRACEPTIVE DEVICE: Primary | ICD-10-CM

## 2020-11-16 PROCEDURE — 58300 INSERT INTRAUTERINE DEVICE: CPT | Performed by: OBSTETRICS & GYNECOLOGY

## 2020-11-16 PROCEDURE — 99213 OFFICE O/P EST LOW 20 MIN: CPT | Mod: 25 | Performed by: OBSTETRICS & GYNECOLOGY

## 2020-11-16 ASSESSMENT — MIFFLIN-ST. JEOR: SCORE: 1370.5

## 2020-11-20 ENCOUNTER — TELEPHONE (OUTPATIENT)
Dept: OBGYN | Facility: CLINIC | Age: 45
End: 2020-11-20

## 2020-11-20 NOTE — TELEPHONE ENCOUNTER
11/16/20 IUD  Started back pain prior to the IUD but tolerable and then increased after the IUD placement  Lower back pain is intolerable despite Tyl/Ibu  Not tried heat/ice  Cannot get in and out of bed, has to help from ; cannot stand up straight.  Denies pelvic pain or cramping or bleeding.    Recommended evaluation from PCP or urgent care/ER as sounds like something that started prior to IUD - musculoskeletal and now worsening. Absence of cramping/bleeding reassuring.    Recommended TYl/IBu alternating and try heat alt w ice and warm tub bath.    Pt verbalized understanding, in agreement with plan, and voiced no further questions.  Stacy Macedo RN on 11/20/2020 at 10:31 AM

## 2020-12-01 ENCOUNTER — ANCILLARY PROCEDURE (OUTPATIENT)
Dept: GENERAL RADIOLOGY | Facility: CLINIC | Age: 45
End: 2020-12-01
Attending: INTERNAL MEDICINE
Payer: COMMERCIAL

## 2020-12-01 ENCOUNTER — OFFICE VISIT (OUTPATIENT)
Dept: PEDIATRICS | Facility: CLINIC | Age: 45
End: 2020-12-01
Payer: COMMERCIAL

## 2020-12-01 VITALS
DIASTOLIC BLOOD PRESSURE: 80 MMHG | HEIGHT: 64 IN | SYSTOLIC BLOOD PRESSURE: 114 MMHG | TEMPERATURE: 97.7 F | HEART RATE: 84 BPM | WEIGHT: 159.8 LBS | OXYGEN SATURATION: 100 % | BODY MASS INDEX: 27.28 KG/M2

## 2020-12-01 DIAGNOSIS — S39.92XA LOWER BACK INJURY, INITIAL ENCOUNTER: Primary | ICD-10-CM

## 2020-12-01 DIAGNOSIS — S39.92XA LOWER BACK INJURY, INITIAL ENCOUNTER: ICD-10-CM

## 2020-12-01 DIAGNOSIS — K21.9 GASTROESOPHAGEAL REFLUX DISEASE WITHOUT ESOPHAGITIS: ICD-10-CM

## 2020-12-01 PROCEDURE — 99204 OFFICE O/P NEW MOD 45 MIN: CPT | Performed by: INTERNAL MEDICINE

## 2020-12-01 PROCEDURE — 72100 X-RAY EXAM L-S SPINE 2/3 VWS: CPT | Performed by: RADIOLOGY

## 2020-12-01 RX ORDER — CYCLOBENZAPRINE HCL 10 MG
10 TABLET ORAL 3 TIMES DAILY PRN
Qty: 15 TABLET | Refills: 0 | Status: SHIPPED | OUTPATIENT
Start: 2020-12-01 | End: 2020-12-16

## 2020-12-01 RX ORDER — NAPROXEN 500 MG/1
500 TABLET ORAL 2 TIMES DAILY WITH MEALS
Qty: 30 TABLET | Refills: 0 | Status: SHIPPED | OUTPATIENT
Start: 2020-12-01 | End: 2020-12-16

## 2020-12-01 ASSESSMENT — MIFFLIN-ST. JEOR: SCORE: 1346.91

## 2020-12-01 NOTE — PROGRESS NOTES
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  On 10/28/2020 she was helping transfer the patient when the patient lost balance and she ended up holding them up and turning in.  She is felt some ripping type discomfort in her left low back and hip area.  She did not think much of it and it did not bother her too much.  When she was home for about 10 days related to Covid quarantine and being have to be working so she felt good.  But as soon as she went back to work pain returned and has been persistent since.  Pain is mostly in the left low back buttock area and radiates down to the left knee.  She has continued to work.  No tingling numbness of the lower extremities.  No bladder or bowel problem.  Back Pain  Onset/Duration: in October hurt back at work,Transfering pt   Description:   Location of pain: low back bilateral  Character of pain: sharp, stabbing and intermittent  Pain radiation: radiates into the left buttocks and radiates into the left leg  New numbness or weakness in legs, not attributed to pain: no   Intensity: Currently 6/10, At its worst 10/10  Progression of Symptoms: improving and but still having issues  History:   Specific cause: work-related  Pain interferes with job: YES  History of back problems: no prior back problems  Any previous MRI or X-rays: None  Sees a specialist for back pain: No  Alleviating factors:   Improved by: Has tried everything over the counter even patches gives relief for short period    Precipitating factors:  Worsened by: Lifting, Bending, Sitting, Walking and sleeping laying down  Therapies tried and outcome: Has tried everything over the counter even patches gives relief for short period    Accompanying Signs & Symptoms:  Risk of Fracture: None  Risk of Cauda Equina: None  Risk of Infection: None  Risk of Cancer: None  Risk of Ankylosing  "Spondylitis: Onset at age <35, male, AND morning back stiffness no            Review of Systems   Constitutional, HEENT, cardiovascular, pulmonary, GI, , musculoskeletal, neuro, skin, endocrine and psych systems are negative, except as otherwise noted.      Objective    There were no vitals taken for this visit.  There is no height or weight on file to calculate BMI.  Physical Exam   GENERAL: healthy, alert and no distress  MS: No tenderness of the lumbosacral spine.  There is tenderness to the left SI joint.  Is tenderness of the left contact bursa region.  Range of motion left hip is good.  NEURO: Normal strength and tone, mentation intact and speech normal.  Pain and touch is very symmetrical.  Straight leg raising was limited at least 30 degrees on the left compared to right.  Distally the strength appears generally symmetrical.    Xray -  X-ray lumbar spine performed and reviewed.  It looks normal to me.    Assessment & Plan     1.  Low back injury initial encounter.  I do not see a neurological deficit which is good.  But she could be having radicular pain to the left knee.  There is possibility of some element of sacroiliitis because she is tender over the left sacroiliac joint.  Given she does not have clinical evidence of neurological deficit I recommend conservative treatment.  I treat her with Naprosyn 5 mg twice a day for a couple of weeks.  Side effects discussed.  She does have a history of heartburn so I will put her on omeprazole 20 mg daily as well.  Also Flexeril 10 mg at bedtime for 2 weeks.  She has not better than consider physical therapy.  We may need to consider MRI of the lumbar spine down the road if her symptoms do not improve.    BMI:   Estimated body mass index is 27.86 kg/m  as calculated from the following:    Height as of this encounter: 1.613 m (5' 3.5\").    Weight as of this encounter: 72.5 kg (159 lb 12.8 oz).            Rajat Peace MD  [facility] " [place]

## 2020-12-23 NOTE — PROGRESS NOTES
SUBJECTIVE:                                                   Elizabeth Jerome is a 45 year old female who presents to clinic today for the following health issue(s):  Patient presents with:  Follow Up: IUD check      Additional information:       HPI:  Persistent bleeding since the IUD was placed. States that she called about severe back pain that started shortly after the IUD and was told that it wasn't the IUD.    Patient's last menstrual period was 2020 (exact date)..     Patient is not sexually active, .  Using IUD for contraception.    reports that she has never smoked. She has never used smokeless tobacco.    STD testing offered?  Declined    Health maintenance updated:  yes    Today's PHQ-2 Score:   PHQ-2 (  [employer]) 3/7/2016   Q1: Little interest or pleasure in doing things 0   Q2: Feeling down, depressed or hopeless 0   PHQ-2 Score 0     Today's PHQ-9 Score:   PHQ-9 SCORE 2019   PHQ-9 Total Score 5     Today's MILEY-7 Score:   MILEY-7 SCORE 2019   Total Score 0       Problem list and histories reviewed & adjusted, as indicated.  Additional history: as documented.    Patient Active Problem List   Diagnosis     Intramural leiomyoma of uterus     Gastroesophageal reflux disease without esophagitis     Past Surgical History:   Procedure Laterality Date      SECTION  2003     GYN SURGERY  10/2014, 2002    Abdominal Myomectomies x 2     OPERATIVE HYSTEROSCOPY WITH MORCELLATOR N/A 2018    Procedure: OPERATIVE HYSTEROSCOPY WITH MORCELLATOR FOR MYOMECTOMY(MYOSURE);  Surgeon: Laura Mixon MD;  Location: [facility]     OPERATIVE HYSTEROSCOPY WITH MORCELLATOR N/A 2019    Procedure: OPERATIVE HYSTEROSCOPY WITH MYOSURE;  Surgeon: Laura Mixno MD;  Location:  OR      Social History     Tobacco Use     Smoking status: Never Smoker     Smokeless tobacco: Never Used   Substance Use Topics     Alcohol use: Yes     Alcohol/week: 0.0 standard  "drinks     Comment: rare      Problem (# of Occurrences) Relation (Name,Age of Onset)    Diabetes (1) Maternal Grandmother    Hyperlipidemia (2) Mother, Brother    Hypertension (2) Mother, Father    No Known Problems (4) Sister, Maternal Grandfather, Paternal Grandmother, Other            Current Outpatient Medications   Medication Sig     levonorgestrel (MIRENA) 20 MCG/24HR IUD 1 each (20 mcg) by Intrauterine route once     omeprazole (PRILOSEC) 20 MG DR capsule Take 1 capsule (20 mg) by mouth daily     Ferrous Sulfate (IRON SUPPLEMENT PO) Take 325 mg by mouth daily      Current Facility-Administered Medications   Medication     levonorgestrel (MIRENA) 20 MCG/24HR IUD 20 mcg     No Known Allergies    ROS:  12 point review of systems negative other than symptoms noted below or in the HPI.  No urinary frequency or dysuria, bladder or kidney problems      OBJECTIVE:     /72   Ht 1.613 m (5' 3.5\")   Wt 74.8 kg (165 lb)   LMP 12/18/2020 (Exact Date)   Breastfeeding No   BMI 28.77 kg/m    Body mass index is 28.77 kg/m .    Exam:  Constitutional:  Appearance: Well nourished, well developed alert, in no acute distress  Gastrointestinal:  Abdominal Examination:  Abdomen nontender to palpation, tone normal without rigidity or guarding, no masses present, umbilicus without lesions; Liver/Spleen:  No hepatomegaly present, liver nontender to palpation; Hernias:  No hernias present  Neurologic:  Mental Status:  Oriented X3.  Normal strength and tone, sensory exam grossly normal, mentation intact and speech normal.    Psychiatric:  Mentation appears normal and affect normal/bright.  Cervix: Strings not seen. Not able to be teased out of the cervix.  Uterus: enlarged to 18 week size.     In-Clinic Test Results:  No results found for this or any previous visit (from the past 24 hour(s)).    ASSESSMENT/PLAN:                                                        ICD-10-CM    1. Intrauterine contraceptive device threads " lost, initial encounter  T83.32XA US Transvaginal Non OB     Hearing of her severe pain after the IUD for the first time. Given her exam and bleeding history I have a significant concern for a perforated IUD. I recommend starting with a pelvic sonogram and then a pelvic xray. Although she has heavy bleeding since the IUD was placed we have to assume it is abdominal in location.  Also, her uterus is enlarged to 18 week size. We discussed surgical management of her uterine fibroids.     Laura Mixon MD  [facility] [place]

## 2020-12-28 ENCOUNTER — OFFICE VISIT (OUTPATIENT)
Dept: OBGYN | Facility: CLINIC | Age: 45
End: 2020-12-28
Payer: COMMERCIAL

## 2020-12-28 VITALS
DIASTOLIC BLOOD PRESSURE: 72 MMHG | SYSTOLIC BLOOD PRESSURE: 128 MMHG | WEIGHT: 165 LBS | BODY MASS INDEX: 28.17 KG/M2 | HEIGHT: 64 IN

## 2020-12-28 DIAGNOSIS — T83.32XA INTRAUTERINE CONTRACEPTIVE DEVICE THREADS LOST, INITIAL ENCOUNTER: Primary | ICD-10-CM

## 2020-12-28 PROCEDURE — 99213 OFFICE O/P EST LOW 20 MIN: CPT | Performed by: OBSTETRICS & GYNECOLOGY

## 2020-12-28 ASSESSMENT — MIFFLIN-ST. JEOR: SCORE: 1370.5

## 2021-01-12 NOTE — PROGRESS NOTES
SUBJECTIVE:                                                   Elizabeth Jerome is a 45 year old female who presents to clinic today for the following health issue(s):  Patient presents with:  Ultrasound      Additional information: Mirena inserted on 2020.     HPI:  Elizabeth presents for follow up after her pelvic ultrasound was performed. She would like the IUD removed. She has not gotten any relief from the incessant bleeding. She is due for an annual exam and mammogram and plans to return for this with screening fasting labs as well.     Patient's last menstrual period was 2020..     Patient is not sexually active, .  Using IUD for contraception.    reports that she has never smoked. She has never used smokeless tobacco.    STD testing offered?  Declined    Health maintenance updated:  no, over due for a mammogram.     Today's PHQ-2 Score:   PHQ-2 (  [employer]) 3/7/2016   Q1: Little interest or pleasure in doing things 0   Q2: Feeling down, depressed or hopeless 0   PHQ-2 Score 0     Today's PHQ-9 Score:   PHQ-9 SCORE 2019   PHQ-9 Total Score 5     Today's MILEY-7 Score:   MILEY-7 SCORE 2019   Total Score 0       Problem list and histories reviewed & adjusted, as indicated.  Additional history: as documented.    Patient Active Problem List   Diagnosis     Intramural leiomyoma of uterus     Gastroesophageal reflux disease without esophagitis     Past Surgical History:   Procedure Laterality Date      SECTION  2003     GYN SURGERY  10/2014, 2002    Abdominal Myomectomies x 2     OPERATIVE HYSTEROSCOPY WITH MORCELLATOR N/A 2018    Procedure: OPERATIVE HYSTEROSCOPY WITH MORCELLATOR FOR MYOMECTOMY(MYOSURE);  Surgeon: Laura Mixon MD;  Location: [facility]     OPERATIVE HYSTEROSCOPY WITH MORCELLATOR N/A 2019    Procedure: OPERATIVE HYSTEROSCOPY WITH MYOSURE;  Surgeon: Laura Mixon MD;  Location:  OR      Social History     Tobacco Use  "    Smoking status: Never Smoker     Smokeless tobacco: Never Used   Substance Use Topics     Alcohol use: Yes     Alcohol/week: 0.0 standard drinks     Comment: rare      Problem (# of Occurrences) Relation (Name,Age of Onset)    Diabetes (1) Maternal Grandmother    Hyperlipidemia (2) Mother, Brother    Hypertension (2) Mother, Father    No Known Problems (4) Sister, Maternal Grandfather, Paternal Grandmother, Other            Current Outpatient Medications   Medication Sig     Ferrous Sulfate (IRON SUPPLEMENT PO) Take 325 mg by mouth daily      levonorgestrel (MIRENA) 20 MCG/24HR IUD 1 each (20 mcg) by Intrauterine route once     metFORMIN (GLUCOPHAGE) 500 MG tablet Take 1 tablet (500 mg) by mouth 2 times daily (with meals)     omeprazole (PRILOSEC) 20 MG DR capsule Take 1 capsule (20 mg) by mouth daily     tranexamic acid (LYSTEDA) 650 MG tablet Take 2 tablets (1,300 mg) by mouth 2 times daily for 5 days     No current facility-administered medications for this visit.      No Known Allergies    ROS:  12 point review of systems negative other than symptoms noted below or in the HPI.  No urinary frequency or dysuria, bladder or kidney problems      OBJECTIVE:     /70 (BP Location: Right arm, Patient Position: Sitting, Cuff Size: Adult Regular)   Pulse 76   Ht 1.613 m (5' 3.5\")   Wt 75.3 kg (166 lb)   LMP 12/29/2020   Breastfeeding No   BMI 28.94 kg/m    Body mass index is 28.94 kg/m .    Exam:  Constitutional:  Appearance: Well nourished, well developed alert, in no acute distress  Skin: General Inspection:  No rashes present, no lesions present, no areas of discoloration.  Neurologic:  Mental Status:  Oriented X3.  Normal strength and tone, sensory exam grossly normal, mentation intact and speech normal.    Psychiatric:  Mentation appears normal and affect normal/bright.  Cervix: IUD strings not seen    In-Clinic Test Results:  Results for orders placed or performed in visit on 01/14/21 (from the past " 24 hour(s))   US Transvaginal Non OB    Narrative    US Transvaginal Non OB  Order #: [id number] Accession #: [id number]  Study Notes     RoachAna ANA M on 1/14/2021  2:45 PM      Gynecological Ultrasound Report  Pelvic U/S - Transabdominal and Transvaginal  [facility]  Referring Provider: Laura Mixon MD  Sonographer:  Ana Roach RDMS  Indication: IUD strings lost  LMP: Patient's last menstrual period was 12/18/2020 (exact date).     Gynecological Ultrasonography:   Uterus: anteverted. Contour is irregular w/ myomata: 1 Subserosal 5.4 x   6.1 x 4.1 cm, 2 Intramural 7.1 x 5.8 x 5.1 cm, 3 Intramural 4.2 x 4.2 x   2.9 cm and others smaller.  Size: 14.1 x 13.1 x 10.3 cm  Endometrium: Thickness Total 9.6 mm, indiscrete borders of endometrium.  Findings: Enlarged uterus with numerous fibroids. Difficult visualization   of endometrium due to fibroids.  Right Ovary: 4.0 x 2.8 x 3.2 cm. Wnl  Left Ovary: Not visualized   Cul de Sac Free Fluid: No free fluid     Impression:    Anteverted fibroid uterus. Intracavitary intrauterine device but fundal   placement is difficult to ascertain due to anatomical distortion from   multiple intramural fibroids as measured above. Right ovary seen and   within normal limits.  Laura Mixon MD                ASSESSMENT/PLAN:                                                        ICD-10-CM    1. Encounter for IUD removal  Z30.432    2. Intramural leiomyoma of uterus  D25.1 tranexamic acid (LYSTEDA) 650 MG tablet   3. Overweight (BMI 25.0-29.9)  E66.3 metFORMIN (GLUCOPHAGE) 500 MG tablet     IUD strings removed as noted below. Pap smear was not done as her last pap smear timing was not realized prior to cleaning her cervix with betadine.  Metformin prescribed after discussion with Elizabeth.  Lysteda also prescribed due to concern for heavy bleeding now that the IUD has been removed. She is still deciding between uterine fibroid embolization and a  hysterectomy.    Laura Mixon MD  [facility] [place]    INDICATIONS:                                                      Is a pregnancy test required: No.  Was a consent obtained?  Yes    Elizabeth Jerome is a 45 year old female,, Patient's last menstrual period was 2020. who presents today for IUD removal. Her current IUD was placed 2 months ago. She has had problems including cramping, abdominal pain and bleeding with the IUD. She requests removal of the IUD because she wants to change her method of contraception    Today's PHQ-2 Score:   PHQ-2 (  [employer]) 3/7/2016   Q1: Little interest or pleasure in doing things 0   Q2: Feeling down, depressed or hopeless 0   PHQ-2 Score 0       PROCEDURE:                                                      A speculum exam was performed and the cervix was visualized. The IUD string was not visualized. The cervix was cleaned with betadine X 3 swabs. The anterior lip of the cervix was grasped with a single toothed tenaculum, long polyp forceps were introduced into the endometrium and the IUD was removed intact.    POST PROCEDURE:                                                      The patient tolerated the procedure well. Patient was discharged in stable condition.    Call if bleeding, pain or fever occur.    Laura Mixon MD

## 2021-01-14 ENCOUNTER — ANCILLARY PROCEDURE (OUTPATIENT)
Dept: ULTRASOUND IMAGING | Facility: CLINIC | Age: 46
End: 2021-01-14
Attending: OBSTETRICS & GYNECOLOGY
Payer: COMMERCIAL

## 2021-01-14 ENCOUNTER — OFFICE VISIT (OUTPATIENT)
Dept: OBGYN | Facility: CLINIC | Age: 46
End: 2021-01-14
Attending: OBSTETRICS & GYNECOLOGY
Payer: COMMERCIAL

## 2021-01-14 VITALS
HEIGHT: 64 IN | WEIGHT: 166 LBS | DIASTOLIC BLOOD PRESSURE: 70 MMHG | HEART RATE: 76 BPM | SYSTOLIC BLOOD PRESSURE: 126 MMHG | BODY MASS INDEX: 28.34 KG/M2

## 2021-01-14 DIAGNOSIS — Z30.432 ENCOUNTER FOR IUD REMOVAL: Primary | ICD-10-CM

## 2021-01-14 DIAGNOSIS — T83.32XA INTRAUTERINE CONTRACEPTIVE DEVICE THREADS LOST, INITIAL ENCOUNTER: ICD-10-CM

## 2021-01-14 DIAGNOSIS — E66.3 OVERWEIGHT (BMI 25.0-29.9): ICD-10-CM

## 2021-01-14 DIAGNOSIS — D25.1 INTRAMURAL LEIOMYOMA OF UTERUS: ICD-10-CM

## 2021-01-14 PROCEDURE — 99213 OFFICE O/P EST LOW 20 MIN: CPT | Mod: 25 | Performed by: OBSTETRICS & GYNECOLOGY

## 2021-01-14 PROCEDURE — 76830 TRANSVAGINAL US NON-OB: CPT | Performed by: OBSTETRICS & GYNECOLOGY

## 2021-01-14 PROCEDURE — 58301 REMOVE INTRAUTERINE DEVICE: CPT | Performed by: OBSTETRICS & GYNECOLOGY

## 2021-01-14 RX ORDER — TRANEXAMIC ACID 650 MG/1
1300 TABLET ORAL 2 TIMES DAILY
Qty: 20 TABLET | Refills: 0 | Status: SHIPPED | OUTPATIENT
Start: 2021-01-14 | End: 2021-01-19

## 2021-01-14 ASSESSMENT — MIFFLIN-ST. JEOR: SCORE: 1375.03

## 2021-02-05 NOTE — PROGRESS NOTES
Elizabeth is a 45 year old  female who presents for annual exam.     Besides routine health maintenance, she has no other health concerns today .    HPI:  The patient's PCP is  [facility].  Elizabeth presents for an annual exam. She has not had any bleeding since the IUD was removed and she has had unprotected intercourse. Overall she is less bloated but she continues to suffer from bloating and abdominal pain associated with constipation which can be up to 1 week. She has used Miralax in the past with no relief. She has also used Senna and did not tolerate it. She has not tried Milk of Magnesia. She did not tolerate the Metformin. She had worse abdominal pain and bloating from the Metformin. She stopped it after 2 weeks.       GYNECOLOGIC HISTORY:    Patient's last menstrual period was 2021 (exact date).    Regular menses? no    Length of menses: 4-5 days    Her current contraception method is: none.  She  reports that she has never smoked. She has never used smokeless tobacco.    Patient is sexually active.  STD testing offered?  Declined  Last PHQ-9 score on record =   PHQ-9 SCORE 2021   PHQ-9 Total Score 0     Last GAD7 score on record =   MILEY-7 SCORE 2021   Total Score 0     Alcohol Score = 0    HEALTH MAINTENANCE:  Cholesterol:   Recent Labs   Lab Test 19  1139 18  1602   CHOL 173 187   HDL 56 61   * 116*   TRIG 51 52       Last Mammo: 2021, Result: Normal, Next Mammo: Today  Pap:   Lab Results   Component Value Date    PAP NIL 2017     Colonoscopy:  None, Result: Not applicable, Next Colonoscopy: age 45-50 years.  Dexa:  None    Health maintenance updated:  no    HISTORY:  OB History    Para Term  AB Living   4 1 1 0 3 1   SAB TAB Ectopic Multiple Live Births   0 3 0 0 1      # Outcome Date GA Lbr Rosales/2nd Weight Sex Delivery Anes PTL Lv   4 Term 03    M -SEC   CHRISTINE   3 TAB            2 TAB            1 TAB     "            Patient Active Problem List   Diagnosis     Intramural leiomyoma of uterus     Gastroesophageal reflux disease without esophagitis     Past Surgical History:   Procedure Laterality Date      SECTION  2003     GYN SURGERY  10/2014, 2002    Abdominal Myomectomies x 2     OPERATIVE HYSTEROSCOPY WITH MORCELLATOR N/A 2018    Procedure: OPERATIVE HYSTEROSCOPY WITH MORCELLATOR FOR MYOMECTOMY(MYOSURE);  Surgeon: Laura Mixon MD;  Location: [facility]     OPERATIVE HYSTEROSCOPY WITH MORCELLATOR N/A 2019    Procedure: OPERATIVE HYSTEROSCOPY WITH MYOSURE;  Surgeon: Laura Mixon MD;  Location:  OR      Social History     Tobacco Use     Smoking status: Never Smoker     Smokeless tobacco: Never Used   Substance Use Topics     Alcohol use: Yes     Alcohol/week: 0.0 standard drinks     Comment: rare      Problem (# of Occurrences) Relation (Name,Age of Onset)    Diabetes (1) Maternal Grandmother    Hyperlipidemia (2) Mother, Brother    Hypertension (2) Mother, Father    No Known Problems (4) Sister, Maternal Grandfather, Paternal Grandmother, Other            Current Outpatient Medications   Medication Sig     omeprazole (PRILOSEC) 20 MG DR capsule Take 1 capsule (20 mg) by mouth daily     Ferrous Sulfate (IRON SUPPLEMENT PO) Take 325 mg by mouth daily      metFORMIN (GLUCOPHAGE) 500 MG tablet Take 1 tablet (500 mg) by mouth 2 times daily (with meals) (Patient not taking: Reported on 2021)     No current facility-administered medications for this visit.      No Known Allergies    Past medical, surgical, social and family histories were reviewed and updated in EPIC.    ROS:   12 point review of systems negative other than symptoms noted below or in the HPI.  No urinary frequency or dysuria, bladder or kidney problems    EXAM:  /66   Ht 1.626 m (5' 4\")   Wt 74.5 kg (164 lb 3.2 oz)   LMP 2021 (Exact Date)   Breastfeeding No   BMI 28.18 kg/m   "   BMI: Body mass index is 28.18 kg/m .    PHYSICAL EXAM:  Constitutional:   Appearance: Well nourished, well developed, alert, in no acute distress  Neck:  Lymph Nodes:  No lymphadenopathy present    Thyroid:  Gland size normal, nontender, no nodules or masses present on palpation  Chest:  Respiratory Effort:  Breathing unlabored, CTAB  Cardiovascular:    Heart: Auscultation:  Regular rate, normal rhythm, no murmurs present  Breasts: Inspection of Breasts:  No lymphadenopathy present., Palpation of Breasts and Axillae:  No masses present on palpation, no breast tenderness., Axillary Lymph Nodes:  No lymphadenopathy present. and No nodularity, asymmetry or nipple discharge bilaterally.  Gastrointestinal:   Abdominal Examination:  Abdomen nontender to palpation, tone normal without rigidity or guarding, no masses present, umbilicus without lesions. Well healed vertical skin scar.    Liver and Spleen:  No hepatomegaly present, liver nontender to palpation    Hernias:  No hernias present  Lymphatic: Lymph Nodes:  No other lymphadenopathy present  Skin:  General Inspection:  No rashes present, no lesions present, no areas of  discoloration  Neurologic:    Mental Status:  Oriented X3.  Normal strength and tone, sensory exam                grossly normal, mentation intact and speech normal.    Psychiatric:   Mentation appears normal and affect normal/bright.         Pelvic Exam:  External Genitalia:     Normal appearance for age, no discharge present, no tenderness present, no inflammatory lesions present, color normal  Vagina:     Normal vaginal vault without central or paravaginal defects, no discharge present, no inflammatory lesions present, no masses present  Bladder:     Nontender to palpation  Urethra:   Urethral Body:  Urethra palpation normal, urethra structural support normal   Urethral Meatus:  No erythema or lesions present  Cervix:     Appearance healthy, no lesions present, nontender to palpation, no bleeding  present  Uterus:     Uterus: fibroid and enlarged to 10 week size.   Adnexa:     No adnexal tenderness present, no adnexal masses present  Perineum:     Perineum within normal limits, no evidence of trauma, no rashes or skin lesions present    Genitalia and Groin:     No rashes present, no lesions present, no areas of discoloration, no masses present      COUNSELING:   Reviewed preventive health counseling, as reflected in patient instructions    BMI: Body mass index is 28.18 kg/m .      ASSESSMENT:  45 year old female with satisfactory annual exam.    ICD-10-CM    1. Encounter for gynecological examination without abnormal finding  Z01.419    2. Pre-procedure lab exam  Z01.812 HCG Qual, Urine ([id number])   3. Screening for cervical cancer  Z12.4 Pap imaged thin layer screen with HPV - recommended age 30 - 65     HPV High Risk Types DNA Cervical   4. Constipation, unspecified constipation type  K59.00 GASTROENTEROLOGY ADULT REF CONSULT ONLY   5. Encounter for vitamin deficiency screening  Z13.21 Vitamin D Deficiency   6. Screening, anemia, deficiency, iron  Z13.0 CBC with platelets     Ferritin   7. Lipid screening  Z13.220 Lipid Profile (Chol, Trig, HDL, LDL calc)   8. Screening for endocrine, metabolic and immunity disorder  Z13.29 Comprehensive metabolic panel (BMP + Alb, Alk Phos, ALT, AST, Total. Bili, TP)    Z13.228     Z13.0    9. Screening for thyroid disorder  Z13.29 TSH with free T4 reflex   10. Screening for diabetes mellitus  Z13.1 Hemoglobin A1c       PLAN:  Pap smear performed  Screening labs ordered. TSH, Lipid, CMP, Hemoglobin A1C  Referral given to GI  Mammogram today. HCG negative    Laura Mixon MD   36.8

## 2021-02-08 ENCOUNTER — OFFICE VISIT (OUTPATIENT)
Dept: OBGYN | Facility: CLINIC | Age: 46
End: 2021-02-08
Payer: COMMERCIAL

## 2021-02-08 ENCOUNTER — ANCILLARY PROCEDURE (OUTPATIENT)
Dept: MAMMOGRAPHY | Facility: CLINIC | Age: 46
End: 2021-02-08
Payer: COMMERCIAL

## 2021-02-08 VITALS
SYSTOLIC BLOOD PRESSURE: 104 MMHG | WEIGHT: 164.2 LBS | BODY MASS INDEX: 28.03 KG/M2 | HEIGHT: 64 IN | DIASTOLIC BLOOD PRESSURE: 66 MMHG

## 2021-02-08 DIAGNOSIS — Z13.0 SCREENING, ANEMIA, DEFICIENCY, IRON: ICD-10-CM

## 2021-02-08 DIAGNOSIS — Z13.29 SCREENING FOR ENDOCRINE, METABOLIC AND IMMUNITY DISORDER: ICD-10-CM

## 2021-02-08 DIAGNOSIS — Z01.812 PRE-PROCEDURE LAB EXAM: ICD-10-CM

## 2021-02-08 DIAGNOSIS — Z12.4 SCREENING FOR CERVICAL CANCER: ICD-10-CM

## 2021-02-08 DIAGNOSIS — D50.8 OTHER IRON DEFICIENCY ANEMIA: ICD-10-CM

## 2021-02-08 DIAGNOSIS — Z13.228 SCREENING FOR ENDOCRINE, METABOLIC AND IMMUNITY DISORDER: ICD-10-CM

## 2021-02-08 DIAGNOSIS — Z13.0 SCREENING FOR ENDOCRINE, METABOLIC AND IMMUNITY DISORDER: ICD-10-CM

## 2021-02-08 DIAGNOSIS — Z13.1 SCREENING FOR DIABETES MELLITUS: ICD-10-CM

## 2021-02-08 DIAGNOSIS — Z13.220 LIPID SCREENING: ICD-10-CM

## 2021-02-08 DIAGNOSIS — Z01.419 ENCOUNTER FOR GYNECOLOGICAL EXAMINATION WITHOUT ABNORMAL FINDING: Primary | ICD-10-CM

## 2021-02-08 DIAGNOSIS — Z13.29 SCREENING FOR THYROID DISORDER: ICD-10-CM

## 2021-02-08 DIAGNOSIS — Z12.31 VISIT FOR SCREENING MAMMOGRAM: ICD-10-CM

## 2021-02-08 DIAGNOSIS — K59.00 CONSTIPATION, UNSPECIFIED CONSTIPATION TYPE: ICD-10-CM

## 2021-02-08 DIAGNOSIS — E55.9 VITAMIN D DEFICIENCY: ICD-10-CM

## 2021-02-08 DIAGNOSIS — Z13.21 ENCOUNTER FOR VITAMIN DEFICIENCY SCREENING: ICD-10-CM

## 2021-02-08 DIAGNOSIS — Z20.822 ENCOUNTER FOR LABORATORY TESTING FOR COVID-19 VIRUS: Primary | ICD-10-CM

## 2021-02-08 PROBLEM — D50.9 IRON DEFICIENCY ANEMIA: Status: ACTIVE | Noted: 2021-02-08

## 2021-02-08 LAB
DEPRECATED CALCIDIOL+CALCIFEROL SERPL-MC: 12 UG/L (ref 20–75)
ERYTHROCYTE [DISTWIDTH] IN BLOOD BY AUTOMATED COUNT: 20.5 % (ref 10–15)
FERRITIN SERPL-MCNC: 3 NG/ML (ref 8–252)
HBA1C MFR BLD: 4.8 % (ref 0–5.6)
HCG UR QL: NEGATIVE
HCT VFR BLD AUTO: 28.4 % (ref 35–47)
HGB BLD-MCNC: 7.5 G/DL (ref 11.7–15.7)
MCH RBC QN AUTO: 17.1 PG (ref 26.5–33)
MCHC RBC AUTO-ENTMCNC: 26.4 G/DL (ref 31.5–36.5)
MCV RBC AUTO: 65 FL (ref 78–100)
PLATELET # BLD AUTO: 360 10E9/L (ref 150–450)
RBC # BLD AUTO: 4.38 10E12/L (ref 3.8–5.2)
WBC # BLD AUTO: 3.6 10E9/L (ref 4–11)

## 2021-02-08 PROCEDURE — 85027 COMPLETE CBC AUTOMATED: CPT | Performed by: OBSTETRICS & GYNECOLOGY

## 2021-02-08 PROCEDURE — 99396 PREV VISIT EST AGE 40-64: CPT | Performed by: OBSTETRICS & GYNECOLOGY

## 2021-02-08 PROCEDURE — 82728 ASSAY OF FERRITIN: CPT | Performed by: OBSTETRICS & GYNECOLOGY

## 2021-02-08 PROCEDURE — 81025 URINE PREGNANCY TEST: CPT | Performed by: OBSTETRICS & GYNECOLOGY

## 2021-02-08 PROCEDURE — 77067 SCR MAMMO BI INCL CAD: CPT | Mod: TC | Performed by: RADIOLOGY

## 2021-02-08 PROCEDURE — 87624 HPV HI-RISK TYP POOLED RSLT: CPT | Performed by: OBSTETRICS & GYNECOLOGY

## 2021-02-08 PROCEDURE — 84443 ASSAY THYROID STIM HORMONE: CPT | Performed by: OBSTETRICS & GYNECOLOGY

## 2021-02-08 PROCEDURE — G0145 SCR C/V CYTO,THINLAYER,RESCR: HCPCS | Performed by: OBSTETRICS & GYNECOLOGY

## 2021-02-08 PROCEDURE — 82306 VITAMIN D 25 HYDROXY: CPT | Performed by: OBSTETRICS & GYNECOLOGY

## 2021-02-08 PROCEDURE — 83036 HEMOGLOBIN GLYCOSYLATED A1C: CPT | Performed by: OBSTETRICS & GYNECOLOGY

## 2021-02-08 PROCEDURE — 36415 COLL VENOUS BLD VENIPUNCTURE: CPT | Performed by: OBSTETRICS & GYNECOLOGY

## 2021-02-08 PROCEDURE — 80053 COMPREHEN METABOLIC PANEL: CPT | Performed by: OBSTETRICS & GYNECOLOGY

## 2021-02-08 PROCEDURE — 80061 LIPID PANEL: CPT | Performed by: OBSTETRICS & GYNECOLOGY

## 2021-02-08 ASSESSMENT — ANXIETY QUESTIONNAIRES
2. NOT BEING ABLE TO STOP OR CONTROL WORRYING: NOT AT ALL
GAD7 TOTAL SCORE: 0
7. FEELING AFRAID AS IF SOMETHING AWFUL MIGHT HAPPEN: NOT AT ALL
6. BECOMING EASILY ANNOYED OR IRRITABLE: NOT AT ALL
5. BEING SO RESTLESS THAT IT IS HARD TO SIT STILL: NOT AT ALL
3. WORRYING TOO MUCH ABOUT DIFFERENT THINGS: NOT AT ALL
1. FEELING NERVOUS, ANXIOUS, OR ON EDGE: NOT AT ALL
IF YOU CHECKED OFF ANY PROBLEMS ON THIS QUESTIONNAIRE, HOW DIFFICULT HAVE THESE PROBLEMS MADE IT FOR YOU TO DO YOUR WORK, TAKE CARE OF THINGS AT HOME, OR GET ALONG WITH OTHER PEOPLE: NOT DIFFICULT AT ALL

## 2021-02-08 ASSESSMENT — MIFFLIN-ST. JEOR: SCORE: 1374.81

## 2021-02-08 ASSESSMENT — PATIENT HEALTH QUESTIONNAIRE - PHQ9
5. POOR APPETITE OR OVEREATING: NOT AT ALL
SUM OF ALL RESPONSES TO PHQ QUESTIONS 1-9: 0

## 2021-02-09 LAB
ALBUMIN SERPL-MCNC: 3.9 G/DL (ref 3.4–5)
ALP SERPL-CCNC: 77 U/L (ref 40–150)
ALT SERPL W P-5'-P-CCNC: 77 U/L (ref 0–50)
ANION GAP SERPL CALCULATED.3IONS-SCNC: 6 MMOL/L (ref 3–14)
AST SERPL W P-5'-P-CCNC: 68 U/L (ref 0–45)
BILIRUB SERPL-MCNC: 0.8 MG/DL (ref 0.2–1.3)
BUN SERPL-MCNC: 7 MG/DL (ref 7–30)
CALCIUM SERPL-MCNC: 8.9 MG/DL (ref 8.5–10.1)
CHLORIDE SERPL-SCNC: 107 MMOL/L (ref 94–109)
CHOLEST SERPL-MCNC: 185 MG/DL
CO2 SERPL-SCNC: 24 MMOL/L (ref 20–32)
CREAT SERPL-MCNC: 0.77 MG/DL (ref 0.52–1.04)
GFR SERPL CREATININE-BSD FRML MDRD: >90 ML/MIN/{1.73_M2}
GLUCOSE SERPL-MCNC: 89 MG/DL (ref 70–99)
HDLC SERPL-MCNC: 65 MG/DL
LDLC SERPL CALC-MCNC: 102 MG/DL
NONHDLC SERPL-MCNC: 120 MG/DL
POTASSIUM SERPL-SCNC: 3.9 MMOL/L (ref 3.4–5.3)
PROT SERPL-MCNC: 7.9 G/DL (ref 6.8–8.8)
SODIUM SERPL-SCNC: 137 MMOL/L (ref 133–144)
TRIGL SERPL-MCNC: 89 MG/DL
TSH SERPL DL<=0.005 MIU/L-ACNC: 2.16 MU/L (ref 0.4–4)

## 2021-02-09 ASSESSMENT — ANXIETY QUESTIONNAIRES: GAD7 TOTAL SCORE: 0

## 2021-02-10 LAB
COPATH REPORT: NORMAL
PAP: NORMAL

## 2021-02-11 LAB
FINAL DIAGNOSIS: NORMAL
HPV HR 12 DNA CVX QL NAA+PROBE: NEGATIVE
HPV16 DNA SPEC QL NAA+PROBE: NEGATIVE
HPV18 DNA SPEC QL NAA+PROBE: NEGATIVE
SPECIMEN DESCRIPTION: NORMAL
SPECIMEN SOURCE CVX/VAG CYTO: NORMAL

## 2021-02-15 ENCOUNTER — TELEPHONE (OUTPATIENT)
Dept: INFUSION THERAPY | Facility: CLINIC | Age: 46
End: 2021-02-15

## 2021-02-15 NOTE — TELEPHONE ENCOUNTER
Left voicemail message for patient requesting a return call regarding scheduling a Covid Test and infusion appointments.  NEW ORDER in Epic

## 2021-02-17 ENCOUNTER — EXTERNAL ORDER RESULTS (OUTPATIENT)
Dept: TRANSPLANT | Facility: CLINIC | Age: 46
End: 2021-02-17

## 2021-02-22 ENCOUNTER — INFUSION THERAPY VISIT (OUTPATIENT)
Dept: INFUSION THERAPY | Facility: CLINIC | Age: 46
End: 2021-02-22
Attending: OBSTETRICS & GYNECOLOGY
Payer: COMMERCIAL

## 2021-02-22 VITALS
OXYGEN SATURATION: 100 % | HEART RATE: 84 BPM | SYSTOLIC BLOOD PRESSURE: 125 MMHG | DIASTOLIC BLOOD PRESSURE: 79 MMHG | TEMPERATURE: 98.1 F | RESPIRATION RATE: 16 BRPM

## 2021-02-22 DIAGNOSIS — D50.8 OTHER IRON DEFICIENCY ANEMIA: Primary | ICD-10-CM

## 2021-02-22 PROCEDURE — 258N000003 HC RX IP 258 OP 636: Performed by: OBSTETRICS & GYNECOLOGY

## 2021-02-22 PROCEDURE — 96365 THER/PROPH/DIAG IV INF INIT: CPT

## 2021-02-22 PROCEDURE — 250N000011 HC RX IP 250 OP 636: Performed by: OBSTETRICS & GYNECOLOGY

## 2021-02-22 PROCEDURE — 96366 THER/PROPH/DIAG IV INF ADDON: CPT

## 2021-02-22 RX ADMIN — IRON SUCROSE 300 MG: 20 INJECTION, SOLUTION INTRAVENOUS at 12:23

## 2021-02-22 NOTE — NURSING NOTE
Infusion Nursing Note:  Elizabeth Jerome presents today for Venofer.    Patient seen by provider today: No   present during visit today: Not Applicable.    Note: N/A.  Patient did not meet criteria for an asymptomatic covid-19 PCR test in infusion today.    Intravenous Access:  Peripheral IV placed.    Treatment Conditions:  Not Applicable.      Post Infusion Assessment:  Patient tolerated infusion without incident.  Site patent and intact, free from redness, edema or discomfort.  No evidence of extravasations.  Access discontinued per protocol.       Discharge Plan:   Patient declined prescription refills.  Discharge instructions reviewed with: Patient.  Patient and/or family verbalized understanding of discharge instructions and all questions answered.  AVS to patient via [employer].  Patient will return 2/26/21 for next appointment.   Patient discharged in stable condition accompanied by: self.  Departure Mode: Ambulatory.    Shamika Madsen RN

## 2021-02-26 ENCOUNTER — INFUSION THERAPY VISIT (OUTPATIENT)
Dept: INFUSION THERAPY | Facility: CLINIC | Age: 46
End: 2021-02-26
Attending: OBSTETRICS & GYNECOLOGY
Payer: COMMERCIAL

## 2021-02-26 VITALS
RESPIRATION RATE: 18 BRPM | SYSTOLIC BLOOD PRESSURE: 120 MMHG | DIASTOLIC BLOOD PRESSURE: 77 MMHG | HEART RATE: 82 BPM | OXYGEN SATURATION: 100 % | TEMPERATURE: 98.5 F

## 2021-02-26 DIAGNOSIS — D50.8 OTHER IRON DEFICIENCY ANEMIA: Primary | ICD-10-CM

## 2021-02-26 PROCEDURE — 96365 THER/PROPH/DIAG IV INF INIT: CPT

## 2021-02-26 PROCEDURE — 96366 THER/PROPH/DIAG IV INF ADDON: CPT

## 2021-02-26 PROCEDURE — 250N000011 HC RX IP 250 OP 636: Performed by: OBSTETRICS & GYNECOLOGY

## 2021-02-26 PROCEDURE — 258N000003 HC RX IP 258 OP 636: Performed by: OBSTETRICS & GYNECOLOGY

## 2021-02-26 RX ADMIN — IRON SUCROSE 300 MG: 20 INJECTION, SOLUTION INTRAVENOUS at 12:32

## 2021-02-26 ASSESSMENT — PAIN SCALES - GENERAL: PAINLEVEL: NO PAIN (0)

## 2021-02-26 NOTE — PROGRESS NOTES
Infusion Nursing Note:  Elizabeth Jerome presents today for venofer  Patient seen by provider today: No   present during visit today: Not Applicable.    Note: N/A.    Intravenous Access:  Peripheral IV placed.    Treatment Conditions:  Not Applicable.      Post Infusion Assessment:  Patient tolerated infusion without incident.  Site patent and intact, free from redness, edema or discomfort.  No evidence of extravasations.  Access discontinued per protocol.       Discharge Plan:   AVS to patient via MYCHART.  Patient will return as prev simon for next appointment.   Patient discharged in stable condition accompanied by: self.  Departure Mode: Ambulatory.    Jose Roberto Kimball RN

## 2021-03-02 ENCOUNTER — INFUSION THERAPY VISIT (OUTPATIENT)
Dept: INFUSION THERAPY | Facility: CLINIC | Age: 46
End: 2021-03-02
Attending: OBSTETRICS & GYNECOLOGY
Payer: COMMERCIAL

## 2021-03-02 VITALS
HEART RATE: 79 BPM | OXYGEN SATURATION: 100 % | SYSTOLIC BLOOD PRESSURE: 120 MMHG | DIASTOLIC BLOOD PRESSURE: 75 MMHG | RESPIRATION RATE: 18 BRPM | TEMPERATURE: 98.4 F

## 2021-03-02 DIAGNOSIS — D50.8 OTHER IRON DEFICIENCY ANEMIA: Primary | ICD-10-CM

## 2021-03-02 PROCEDURE — 258N000003 HC RX IP 258 OP 636: Performed by: OBSTETRICS & GYNECOLOGY

## 2021-03-02 PROCEDURE — 96366 THER/PROPH/DIAG IV INF ADDON: CPT

## 2021-03-02 PROCEDURE — 96365 THER/PROPH/DIAG IV INF INIT: CPT

## 2021-03-02 PROCEDURE — 250N000011 HC RX IP 250 OP 636: Performed by: OBSTETRICS & GYNECOLOGY

## 2021-03-02 RX ADMIN — IRON SUCROSE 300 MG: 20 INJECTION, SOLUTION INTRAVENOUS at 12:26

## 2021-03-02 NOTE — PROGRESS NOTES
Infusion Nursing Note:  Elizabeth Jerome presents today for venofer 3/3.    Patient seen by provider today: No   present during visit today: Not Applicable.    Note: N/A.  Patient did not meet criteria for an asymptomatic covid-19 PCR test in infusion today.     Intravenous Access:  Peripheral IV placed.    Treatment Conditions:  Not Applicable.      Post Infusion Assessment:  Patient tolerated infusion without incident.  Site patent and intact, free from redness, edema or discomfort.  No evidence of extravasations.  Access discontinued per protocol.       Discharge Plan:   AVS to patient via MYCHART.  Patient will return prn for next appointment.   Patient discharged in stable condition accompanied by: self.  Departure Mode: Ambulatory.    Jose Roberto Kimball RN

## 2021-03-10 DIAGNOSIS — E55.9 VITAMIN D DEFICIENCY: ICD-10-CM

## 2021-03-10 NOTE — TELEPHONE ENCOUNTER
Requested Prescriptions   Pending Prescriptions Disp Refills     vitamin D3 (CHOLECALCIFEROL) 1.25 MG (36970 UT) capsule 6 capsule 0     Sig: Take 1 capsule (50,000 Units) by mouth every 7 days       There is no refill protocol information for this order        Last Written Prescription Date:  2/9/21  Last Fill Quantity: 6,  # refills: 0   Last office visit: 2/8/2021 with prescribing provider:  Dr Mixon   Future Office Visit:  None

## 2021-03-10 NOTE — TELEPHONE ENCOUNTER
"2/9/21  Vitamin D deficiency. I am prescribing 50K units of D3 weekly for 6 weeks and I recommend taking 2000 international unit(s) daily after that  Refill request refused due to :   [] Refills available at pharmacy.  Request sent back to pharmacy as \"duplicate\"  [x] Patient report no longer needing it  [] Medication discontinued    Mojgan Joshi RN on 3/10/2021 at 2:42 PM            "

## 2021-06-14 NOTE — PROGRESS NOTES
SUBJECTIVE:                                                   Elizabeth Jerome is a 46 year old female who presents to clinic today for the following health issue(s):  Patient presents with:  RECHECK: c/o bloating, weight gain, nausea after eating, and abdominal/pelvic pain that sometimes radiates down thighs and butt. Discuss ongoing fibroid issue & thyroid and what to do next. States never went to GI because didn't want to do virtual appt and that's all they offered at time      HPI:  Elizabeth presents with her  about her ongoing heavy and painful periods. She has known uterine fibroids and was previously counseled about uterine fibroid embolization versus a hysterectomy. Elizabeth has had two prior abdominal myomectomies and one  section. She has had two hysteroscopies with one hysteroscopic myomectomy. Given her prolonged course with infertility she wanted to secure a surrogate before pursuing definitive treatment. She tried the progesterone intrauterine device but it did not improve her bleeding and increased her pelvic pain. She was lastly treated with IV Iron 6 months prior due to severe iron deficiency anemia with a hemoglobin of 7.5. She experiences bloating and nausea. She was not able to see GI in person since her last visit. She is ready for definitive treatment at this time. She has experienced weight gain despite monitoring her intake. She states that she moves around a lot at her job but she admits to not exercising.    Patient's last menstrual period was 2021 (exact date)..     Patient is sexually active, .  Using none for contraception.    reports that she has never smoked. She has never used smokeless tobacco.    STD testing offered?  Declined    Health maintenance updated:  yes    Today's PHQ-2 Score:   PHQ-2 (  [employer]) 3/7/2016   Q1: Little interest or pleasure in doing things 0   Q2: Feeling down, depressed or hopeless 0   PHQ-2 Score 0     Today's PHQ-9 Score:    PHQ-9 SCORE 2021   PHQ-9 Total Score 0     Today's MILEY-7 Score:   MILEY-7 SCORE 2021   Total Score 0       Problem list and histories reviewed & adjusted, as indicated.  Additional history: as documented.    Patient Active Problem List   Diagnosis     Intramural leiomyoma of uterus     Gastroesophageal reflux disease without esophagitis     Iron deficiency anemia     Past Surgical History:   Procedure Laterality Date      SECTION  2003     GYN SURGERY  10/2014, 2002    Abdominal Myomectomies x 2     OPERATIVE HYSTEROSCOPY WITH MORCELLATOR N/A 2018    Procedure: OPERATIVE HYSTEROSCOPY WITH MORCELLATOR FOR MYOMECTOMY(MYOSURE);  Surgeon: Laura Mixon MD;  Location: [facility]     OPERATIVE HYSTEROSCOPY WITH MORCELLATOR N/A 2019    Procedure: OPERATIVE HYSTEROSCOPY WITH MYOSURE;  Surgeon: Laura Mixon MD;  Location:  OR      Social History     Tobacco Use     Smoking status: Never Smoker     Smokeless tobacco: Never Used   Substance Use Topics     Alcohol use: Yes     Alcohol/week: 0.0 standard drinks     Comment: rare      Problem (# of Occurrences) Relation (Name,Age of Onset)    Diabetes (1) Maternal Grandmother    Hyperlipidemia (2) Mother, Brother    Hypertension (2) Mother, Father    No Known Problems (4) Sister, Maternal Grandfather, Paternal Grandmother, Other            Current Outpatient Medications   Medication Sig     Ferrous Sulfate (IRON SUPPLEMENT PO) Take 325 mg by mouth daily      metFORMIN (GLUCOPHAGE) 500 MG tablet Take 1 tablet (500 mg) by mouth 2 times daily (with meals)     omeprazole (PRILOSEC) 20 MG DR capsule Take 1 capsule (20 mg) by mouth daily     vitamin D3 (CHOLECALCIFEROL) 1.25 MG (93783 UT) capsule Take 1 capsule (50,000 Units) by mouth every 7 days     No current facility-administered medications for this visit.     No Known Allergies    ROS:  12 point review of systems negative other than symptoms noted below or in the  "HPI.  Constitutional: Fatigue and Weight Gain  Gastrointestinal: Abdominal Pain, Bloating and Constipation  Genitourinary: Heavy Bleeding with Period and Pelvic Pain  No urinary frequency or dysuria, bladder or kidney problems      OBJECTIVE:     /74   Ht 1.626 m (5' 4\")   Wt 78.8 kg (173 lb 12.8 oz)   LMP 07/08/2021 (Exact Date)   BMI 29.83 kg/m    Body mass index is 29.83 kg/m .    Exam:  Constitutional:  Appearance: Well nourished, well developed alert, in no acute distress  Chest:  Respiratory Effort:  Breathing unlabored. Clear to auscultation bilaterally.   Cardiovascular: Heart: Auscultation:  Regular rate, normal rhythm, no murmurs present  Gastrointestinal:  Well healed pfannenstiel and vertical skin scars. Pelvic mass palpable to the level of the umbilicus.  Skin: General Inspection:  No rashes present, no lesions present, no areas of discoloration.  Neurologic:  Mental Status:  Oriented X3.  Normal strength and tone, sensory exam grossly normal, mentation intact and speech normal.    Psychiatric:  Mentation appears normal and affect normal/bright.  Pelvic Exam:  External Genitalia:     Normal appearance for age, no discharge present, no tenderness present, no inflammatory lesions present, color normal  Vagina:     No lesions. Dark red blood present.  Bladder:     Nontender to palpation  Urethra:   Urethral Body:  Urethra palpation normal, urethra structural support normal   Urethral Meatus:  No erythema or lesions present  Cervix:     Appearance healthy, no lesions present, nontender to palpation, minimal bleeding present  Uterus:     Uterus: enlarged to the level of the umbilicus, broad but mobile.   Adnexa:     No adnexal tenderness present, no adnexal masses present  Perineum:     Perineum within normal limits, no evidence of trauma, no rashes or skin lesions present  Genitalia and Groin:     No rashes present, no lesions present, no areas of discoloration, no masses present   "     ASSESSMENT/PLAN:                                                        ICD-10-CM    1. Intramural leiomyoma of uterus  D25.1    2. Iron deficiency anemia due to chronic blood loss  D50.0 CBC with platelets     Ferritin     CBC with platelets     Ferritin   3. Screening for endocrine, metabolic and immunity disorder  Z13.29 Comprehensive metabolic panel (BMP + Alb, Alk Phos, ALT, AST, Total. Bili, TP)    Z13.228 Comprehensive metabolic panel (BMP + Alb, Alk Phos, ALT, AST, Total. Bili, TP)    Z13.0    4. Screening for thyroid disorder  Z13.29 TSH with free T4 reflex     TSH with free T4 reflex   5. Vitamin D deficiency  E55.9 Vitamin D Deficiency     Vitamin D Deficiency     We discussed in great detail the next steps. The various routes of a hysterectomy were discussed. Given the large size of her uterus it would have to be morcellated to be removed vaginally. Elizabeth and her  were counseled about the risks of surgery including but not limited to bleeding, damage to nearby structures including but not limited to blood vessels and the bladder and ureters. The need for a diagnostic cystoscopy was discussed. I do not recommend an oophorectomy at this time. The pros and cons of a total versus subtotal (supracervical) hysterectomy were discussed today as well. Elizabeth has had a remote history of an abnormal pap smear but her subsequent pap smears have been normal. She is opting for a supracervical hysterectomy. We discussed the possibility of an open abdominal procedure if unable to visualize the anatomy sufficiently due to uterine size and due to her prior surgeries. Elizabeth voiced understanding and would like to proceed. Will send labs and recheck hemoglobin and ferritin due to recent IV Iron infusion and continued blood loss.    Laura Mixon MD  [facility] [place]

## 2021-07-12 ENCOUNTER — OFFICE VISIT (OUTPATIENT)
Dept: OBGYN | Facility: CLINIC | Age: 46
End: 2021-07-12
Payer: COMMERCIAL

## 2021-07-12 ENCOUNTER — PREP FOR PROCEDURE (OUTPATIENT)
Dept: OBGYN | Facility: CLINIC | Age: 46
End: 2021-07-12

## 2021-07-12 VITALS
WEIGHT: 173.8 LBS | BODY MASS INDEX: 29.67 KG/M2 | DIASTOLIC BLOOD PRESSURE: 74 MMHG | SYSTOLIC BLOOD PRESSURE: 110 MMHG | HEIGHT: 64 IN

## 2021-07-12 DIAGNOSIS — D50.0 IRON DEFICIENCY ANEMIA DUE TO CHRONIC BLOOD LOSS: ICD-10-CM

## 2021-07-12 DIAGNOSIS — Z13.228 SCREENING FOR ENDOCRINE, METABOLIC AND IMMUNITY DISORDER: ICD-10-CM

## 2021-07-12 DIAGNOSIS — D25.1 INTRAMURAL LEIOMYOMA OF UTERUS: Primary | ICD-10-CM

## 2021-07-12 DIAGNOSIS — N93.9 ABNORMAL UTERINE BLEEDING DUE TO INTRAMURAL LEIOMYOMA: Primary | ICD-10-CM

## 2021-07-12 DIAGNOSIS — Z13.0 SCREENING FOR ENDOCRINE, METABOLIC AND IMMUNITY DISORDER: ICD-10-CM

## 2021-07-12 DIAGNOSIS — Z13.29 SCREENING FOR ENDOCRINE, METABOLIC AND IMMUNITY DISORDER: ICD-10-CM

## 2021-07-12 DIAGNOSIS — E55.9 VITAMIN D DEFICIENCY: ICD-10-CM

## 2021-07-12 DIAGNOSIS — Z13.29 SCREENING FOR THYROID DISORDER: ICD-10-CM

## 2021-07-12 DIAGNOSIS — D25.1 ABNORMAL UTERINE BLEEDING DUE TO INTRAMURAL LEIOMYOMA: Primary | ICD-10-CM

## 2021-07-12 LAB
ERYTHROCYTE [DISTWIDTH] IN BLOOD BY AUTOMATED COUNT: 13.5 % (ref 10–15)
HCT VFR BLD AUTO: 32.7 % (ref 35–47)
HGB BLD-MCNC: 10.1 G/DL (ref 11.7–15.7)
MCH RBC QN AUTO: 26.9 PG (ref 26.5–33)
MCHC RBC AUTO-ENTMCNC: 30.9 G/DL (ref 31.5–36.5)
MCV RBC AUTO: 87 FL (ref 78–100)
PLATELET # BLD AUTO: 144 10E3/UL (ref 150–450)
RBC # BLD AUTO: 3.75 10E6/UL (ref 3.8–5.2)
WBC # BLD AUTO: 3.3 10E3/UL (ref 4–11)

## 2021-07-12 PROCEDURE — 82306 VITAMIN D 25 HYDROXY: CPT | Performed by: OBSTETRICS & GYNECOLOGY

## 2021-07-12 PROCEDURE — 36415 COLL VENOUS BLD VENIPUNCTURE: CPT | Performed by: OBSTETRICS & GYNECOLOGY

## 2021-07-12 PROCEDURE — 85027 COMPLETE CBC AUTOMATED: CPT | Performed by: OBSTETRICS & GYNECOLOGY

## 2021-07-12 PROCEDURE — 84443 ASSAY THYROID STIM HORMONE: CPT | Performed by: OBSTETRICS & GYNECOLOGY

## 2021-07-12 PROCEDURE — 82728 ASSAY OF FERRITIN: CPT | Performed by: OBSTETRICS & GYNECOLOGY

## 2021-07-12 PROCEDURE — 99214 OFFICE O/P EST MOD 30 MIN: CPT | Performed by: OBSTETRICS & GYNECOLOGY

## 2021-07-12 PROCEDURE — 80053 COMPREHEN METABOLIC PANEL: CPT | Performed by: OBSTETRICS & GYNECOLOGY

## 2021-07-12 PROCEDURE — 84439 ASSAY OF FREE THYROXINE: CPT | Performed by: OBSTETRICS & GYNECOLOGY

## 2021-07-12 ASSESSMENT — MIFFLIN-ST. JEOR: SCORE: 1413.35

## 2021-07-13 ENCOUNTER — TELEPHONE (OUTPATIENT)
Dept: OBGYN | Facility: CLINIC | Age: 46
End: 2021-07-13

## 2021-07-13 PROBLEM — D25.1 ABNORMAL UTERINE BLEEDING DUE TO INTRAMURAL LEIOMYOMA: Status: ACTIVE | Noted: 2021-07-13

## 2021-07-13 PROBLEM — N93.9 ABNORMAL UTERINE BLEEDING DUE TO INTRAMURAL LEIOMYOMA: Status: ACTIVE | Noted: 2021-07-13

## 2021-07-13 LAB
ALBUMIN SERPL-MCNC: 3.7 G/DL (ref 3.4–5)
ALP SERPL-CCNC: 75 U/L (ref 40–150)
ALT SERPL W P-5'-P-CCNC: 16 U/L
ANION GAP SERPL CALCULATED.3IONS-SCNC: 8 MMOL/L (ref 3–14)
AST SERPL W P-5'-P-CCNC: 20 U/L (ref 0–45)
BILIRUB SERPL-MCNC: 0.4 MG/DL (ref 0.2–1.3)
BUN SERPL-MCNC: 13 MG/DL (ref 7–30)
CALCIUM SERPL-MCNC: 8.6 MG/DL (ref 8.5–10.1)
CHLORIDE BLD-SCNC: 105 MMOL/L
CO2 SERPL-SCNC: 25 MMOL/L (ref 20–32)
CREAT SERPL-MCNC: 0.97 MG/DL
DEPRECATED CALCIDIOL+CALCIFEROL SERPL-MC: 37 UG/L (ref 20–75)
FERRITIN SERPL-MCNC: 6 NG/ML
GFR SERPL CREATININE-BSD FRML MDRD: 70 ML/MIN/1.73M2
GLUCOSE BLD-MCNC: 87 MG/DL (ref 70–99)
POTASSIUM BLD-SCNC: 4.1 MMOL/L (ref 3.4–5.3)
PROT SERPL-MCNC: 7.1 G/DL (ref 6.8–8.8)
SODIUM SERPL-SCNC: 138 MMOL/L (ref 133–144)
T4 FREE SERPL-MCNC: 1.01 NG/DL (ref 0.76–1.46)
TSH SERPL DL<=0.005 MIU/L-ACNC: 4.87 MU/L (ref 0.4–4)

## 2021-07-13 NOTE — TELEPHONE ENCOUNTER
Additional Instructions for the Case  ERAS BAG GIVEN Yes  ERAS INSTRUCTIONS EXPLAINED Yes    ASSIST: Dr. Yudith Lloyd    H&P TO BE COMPLETED BY:   Surgeon  FOR H&P TO BE DONE BY SURGEON   ALREADY DONE:  DATE  7/12/2021  SAME DAY/OBSERVATION/INPATIENT: STRAIGHT SAME DAY  EQUIPMENT: Pneumoliner in the room but have abdominal hysterectomy tray as well  VENDOR NEEDED AT CASE: n/a  IF IUD WHAT BRAND n/a  LABS/SPECIAL INSTRUCTIONS: CBC type and screen on admission.  TIME OFF WORK: 4-8 weeks depending on surgery done.

## 2021-07-13 NOTE — TELEPHONE ENCOUNTER
Type of surgery: LSH BS VS OPEN ABD HYST CYSTO  Location of surgery: [facility] OR  Date and time of surgery: 8/18/2021 7:30a  Surgeon: PAMELA  Pre-Op Appt Date: 7/12/2021 Update at hospital  Post-Op Appt Date: TBD   Packet sent out: MAILED 7/13/2021  Pre-cert/Authorization completed:  TBD  Date: 7/13/2021 Margot w/Katina KINGSTON TEST 8/15/2021 1:15p NEHEMIAS Mccord  Surgery Scheduler    CPT 68180 vs 64893

## 2021-07-13 NOTE — RESULT ENCOUNTER NOTE
Patient tests results reviewed. Please call patient.  1. Still has anemia although it is slightly better than before. Start taking oral iron. Ferrous sulfate 325 mg twice a day  2. Subclinical hypothyroidism. Does not need medication at this time but thyroid should be rechecked in 6 months  3. Normal vitamin D level  4. Normal Kidney and liver function

## 2021-07-15 DIAGNOSIS — Z11.59 ENCOUNTER FOR SCREENING FOR OTHER VIRAL DISEASES: ICD-10-CM

## 2021-08-03 ENCOUNTER — PREP FOR PROCEDURE (OUTPATIENT)
Dept: OBGYN | Facility: CLINIC | Age: 46
End: 2021-08-03

## 2021-08-03 DIAGNOSIS — D50.0 IRON DEFICIENCY ANEMIA DUE TO CHRONIC BLOOD LOSS: ICD-10-CM

## 2021-08-03 DIAGNOSIS — D25.1 ABNORMAL UTERINE BLEEDING DUE TO INTRAMURAL LEIOMYOMA: Primary | ICD-10-CM

## 2021-08-03 DIAGNOSIS — N93.9 ABNORMAL UTERINE BLEEDING DUE TO INTRAMURAL LEIOMYOMA: Primary | ICD-10-CM

## 2021-08-03 RX ORDER — ACETAMINOPHEN 325 MG/1
975 TABLET ORAL ONCE
Status: CANCELLED | OUTPATIENT
Start: 2021-08-03 | End: 2021-08-03

## 2021-08-03 RX ORDER — PHENAZOPYRIDINE HYDROCHLORIDE 100 MG/1
200 TABLET, FILM COATED ORAL ONCE
Status: CANCELLED | OUTPATIENT
Start: 2021-08-03 | End: 2021-08-03

## 2021-08-05 ENCOUNTER — TELEPHONE (OUTPATIENT)
Dept: OBGYN | Facility: CLINIC | Age: 46
End: 2021-08-05

## 2021-08-05 NOTE — LETTER
Elizabeth Jerome, female, 1975  [id number] [facility] [address]        To Whom It May Concern:      Elizabeth will need to be off of work from 8/18/21-9/29/21.      Please contact our office with any questions or concerns.         MD:  Laura Mixon MD

## 2021-08-10 NOTE — TELEPHONE ENCOUNTER
Patient returned call. Says her employeer still has not received the paperwork since she called on 8/5. Please return call.

## 2021-08-12 ENCOUNTER — TELEPHONE (OUTPATIENT)
Dept: OBGYN | Facility: CLINIC | Age: 46
End: 2021-08-12

## 2021-08-12 NOTE — TELEPHONE ENCOUNTER
Type of Paperwork received:  fmla     Date Rcvd:  8/12/2021    Rcvd From (Company name): [employer]     Provider:  Oral Luevano on Provider Cart Date:  8/12/2021

## 2021-08-15 ENCOUNTER — LAB (OUTPATIENT)
Dept: LAB | Facility: CLINIC | Age: 46
End: 2021-08-15
Payer: COMMERCIAL

## 2021-08-15 DIAGNOSIS — Z11.59 ENCOUNTER FOR SCREENING FOR OTHER VIRAL DISEASES: ICD-10-CM

## 2021-08-15 PROCEDURE — U0005 INFEC AGEN DETEC AMPLI PROBE: HCPCS

## 2021-08-15 PROCEDURE — U0003 INFECTIOUS AGENT DETECTION BY NUCLEIC ACID (DNA OR RNA); SEVERE ACUTE RESPIRATORY SYNDROME CORONAVIRUS 2 (SARS-COV-2) (CORONAVIRUS DISEASE [COVID-19]), AMPLIFIED PROBE TECHNIQUE, MAKING USE OF HIGH THROUGHPUT TECHNOLOGIES AS DESCRIBED BY CMS-2020-01-R: HCPCS

## 2021-08-16 LAB — SARS-COV-2 RNA RESP QL NAA+PROBE: NEGATIVE

## 2021-08-18 ENCOUNTER — ANESTHESIA EVENT (OUTPATIENT)
Dept: SURGERY | Facility: CLINIC | Age: 46
DRG: 742 | End: 2021-08-18
Payer: COMMERCIAL

## 2021-08-18 ENCOUNTER — ANESTHESIA (OUTPATIENT)
Dept: SURGERY | Facility: CLINIC | Age: 46
DRG: 742 | End: 2021-08-18
Payer: COMMERCIAL

## 2021-08-18 ENCOUNTER — HOSPITAL ENCOUNTER (INPATIENT)
Facility: CLINIC | Age: 46
LOS: 2 days | Discharge: HOME | DRG: 742 | End: 2021-08-20
Attending: OBSTETRICS & GYNECOLOGY | Admitting: OBSTETRICS & GYNECOLOGY
Payer: COMMERCIAL

## 2021-08-18 DIAGNOSIS — I10 BENIGN ESSENTIAL HYPERTENSION: ICD-10-CM

## 2021-08-18 DIAGNOSIS — N93.9 ABNORMAL UTERINE BLEEDING DUE TO INTRAMURAL LEIOMYOMA: ICD-10-CM

## 2021-08-18 DIAGNOSIS — D25.1 ABNORMAL UTERINE BLEEDING DUE TO INTRAMURAL LEIOMYOMA: ICD-10-CM

## 2021-08-18 DIAGNOSIS — Z90.710 S/P HYSTERECTOMY: Primary | ICD-10-CM

## 2021-08-18 DIAGNOSIS — D50.0 IRON DEFICIENCY ANEMIA DUE TO CHRONIC BLOOD LOSS: ICD-10-CM

## 2021-08-18 LAB
ABO/RH(D): NORMAL
ANTIBODY SCREEN: NEGATIVE
B-HCG SERPL-ACNC: <1 IU/L (ref 0–5)
CREAT SERPL-MCNC: 0.82 MG/DL (ref 0.52–1.04)
ERYTHROCYTE [DISTWIDTH] IN BLOOD BY AUTOMATED COUNT: 15.5 % (ref 10–15)
GFR SERPL CREATININE-BSD FRML MDRD: 86 ML/MIN/1.73M2
HCT VFR BLD AUTO: 36.3 % (ref 35–47)
HGB BLD-MCNC: 11.2 G/DL (ref 11.7–15.7)
MCH RBC QN AUTO: 27.5 PG (ref 26.5–33)
MCHC RBC AUTO-ENTMCNC: 30.9 G/DL (ref 31.5–36.5)
MCV RBC AUTO: 89 FL (ref 78–100)
PLATELET # BLD AUTO: 227 10E3/UL (ref 150–450)
RBC # BLD AUTO: 4.08 10E6/UL (ref 3.8–5.2)
SPECIMEN EXPIRATION DATE: NORMAL
WBC # BLD AUTO: 4 10E3/UL (ref 4–11)

## 2021-08-18 PROCEDURE — 250N000025 HC SEVOFLURANE, PER MIN: Performed by: OBSTETRICS & GYNECOLOGY

## 2021-08-18 PROCEDURE — 36415 COLL VENOUS BLD VENIPUNCTURE: CPT | Performed by: OBSTETRICS & GYNECOLOGY

## 2021-08-18 PROCEDURE — 58180 PARTIAL HYSTERECTOMY: CPT | Mod: 80 | Performed by: OBSTETRICS & GYNECOLOGY

## 2021-08-18 PROCEDURE — 999N000141 HC STATISTIC PRE-PROCEDURE NURSING ASSESSMENT: Performed by: OBSTETRICS & GYNECOLOGY

## 2021-08-18 PROCEDURE — 250N000011 HC RX IP 250 OP 636: Performed by: OBSTETRICS & GYNECOLOGY

## 2021-08-18 PROCEDURE — 0UN90ZZ RELEASE UTERUS, OPEN APPROACH: ICD-10-PCS | Performed by: OBSTETRICS & GYNECOLOGY

## 2021-08-18 PROCEDURE — 0TJB8ZZ INSPECTION OF BLADDER, VIA NATURAL OR ARTIFICIAL OPENING ENDOSCOPIC: ICD-10-PCS | Performed by: OBSTETRICS & GYNECOLOGY

## 2021-08-18 PROCEDURE — 85027 COMPLETE CBC AUTOMATED: CPT | Performed by: OBSTETRICS & GYNECOLOGY

## 2021-08-18 PROCEDURE — 84702 CHORIONIC GONADOTROPIN TEST: CPT | Performed by: OBSTETRICS & GYNECOLOGY

## 2021-08-18 PROCEDURE — 710N000009 HC RECOVERY PHASE 1, LEVEL 1, PER MIN: Performed by: OBSTETRICS & GYNECOLOGY

## 2021-08-18 PROCEDURE — 250N000011 HC RX IP 250 OP 636: Performed by: SURGERY

## 2021-08-18 PROCEDURE — 258N000001 HC RX 258: Performed by: OBSTETRICS & GYNECOLOGY

## 2021-08-18 PROCEDURE — 120N000001 HC R&B MED SURG/OB

## 2021-08-18 PROCEDURE — 258N000003 HC RX IP 258 OP 636: Performed by: NURSE ANESTHETIST, CERTIFIED REGISTERED

## 2021-08-18 PROCEDURE — 58180 PARTIAL HYSTERECTOMY: CPT | Performed by: OBSTETRICS & GYNECOLOGY

## 2021-08-18 PROCEDURE — 258N000003 HC RX IP 258 OP 636: Performed by: OBSTETRICS & GYNECOLOGY

## 2021-08-18 PROCEDURE — P9041 ALBUMIN (HUMAN),5%, 50ML: HCPCS | Performed by: NURSE ANESTHETIST, CERTIFIED REGISTERED

## 2021-08-18 PROCEDURE — 370N000017 HC ANESTHESIA TECHNICAL FEE, PER MIN: Performed by: OBSTETRICS & GYNECOLOGY

## 2021-08-18 PROCEDURE — 250N000009 HC RX 250: Performed by: NURSE ANESTHETIST, CERTIFIED REGISTERED

## 2021-08-18 PROCEDURE — 86901 BLOOD TYPING SEROLOGIC RH(D): CPT | Performed by: OBSTETRICS & GYNECOLOGY

## 2021-08-18 PROCEDURE — 250N000011 HC RX IP 250 OP 636: Performed by: NURSE ANESTHETIST, CERTIFIED REGISTERED

## 2021-08-18 PROCEDURE — 272N000001 HC OR GENERAL SUPPLY STERILE: Performed by: OBSTETRICS & GYNECOLOGY

## 2021-08-18 PROCEDURE — 250N000013 HC RX MED GY IP 250 OP 250 PS 637: Performed by: OBSTETRICS & GYNECOLOGY

## 2021-08-18 PROCEDURE — 0UT90ZL RESECTION OF UTERUS, SUPRACERVICAL, OPEN APPROACH: ICD-10-PCS | Performed by: OBSTETRICS & GYNECOLOGY

## 2021-08-18 PROCEDURE — 0WJG4ZZ INSPECTION OF PERITONEAL CAVITY, PERCUTANEOUS ENDOSCOPIC APPROACH: ICD-10-PCS | Performed by: OBSTETRICS & GYNECOLOGY

## 2021-08-18 PROCEDURE — 88307 TISSUE EXAM BY PATHOLOGIST: CPT | Mod: TC | Performed by: OBSTETRICS & GYNECOLOGY

## 2021-08-18 PROCEDURE — 360N000076 HC SURGERY LEVEL 3, PER MIN: Performed by: OBSTETRICS & GYNECOLOGY

## 2021-08-18 PROCEDURE — 0UT70ZZ RESECTION OF BILATERAL FALLOPIAN TUBES, OPEN APPROACH: ICD-10-PCS | Performed by: OBSTETRICS & GYNECOLOGY

## 2021-08-18 PROCEDURE — 250N000009 HC RX 250: Performed by: OBSTETRICS & GYNECOLOGY

## 2021-08-18 PROCEDURE — 82565 ASSAY OF CREATININE: CPT | Performed by: OBSTETRICS & GYNECOLOGY

## 2021-08-18 RX ORDER — SODIUM CHLORIDE, SODIUM LACTATE, POTASSIUM CHLORIDE, CALCIUM CHLORIDE 600; 310; 30; 20 MG/100ML; MG/100ML; MG/100ML; MG/100ML
INJECTION, SOLUTION INTRAVENOUS CONTINUOUS
Status: DISCONTINUED | OUTPATIENT
Start: 2021-08-18 | End: 2021-08-18 | Stop reason: HOSPADM

## 2021-08-18 RX ORDER — PROCHLORPERAZINE MALEATE 10 MG
10 TABLET ORAL EVERY 6 HOURS PRN
Status: DISCONTINUED | OUTPATIENT
Start: 2021-08-18 | End: 2021-08-18

## 2021-08-18 RX ORDER — PHENAZOPYRIDINE HYDROCHLORIDE 200 MG/1
200 TABLET, FILM COATED ORAL ONCE
Status: COMPLETED | OUTPATIENT
Start: 2021-08-18 | End: 2021-08-18

## 2021-08-18 RX ORDER — NEOSTIGMINE METHYLSULFATE 1 MG/ML
VIAL (ML) INJECTION PRN
Status: DISCONTINUED | OUTPATIENT
Start: 2021-08-18 | End: 2021-08-18

## 2021-08-18 RX ORDER — MEPERIDINE HYDROCHLORIDE 25 MG/ML
12.5 INJECTION INTRAMUSCULAR; INTRAVENOUS; SUBCUTANEOUS
Status: DISCONTINUED | OUTPATIENT
Start: 2021-08-18 | End: 2021-08-18 | Stop reason: HOSPADM

## 2021-08-18 RX ORDER — ALBUMIN, HUMAN INJ 5% 5 %
SOLUTION INTRAVENOUS CONTINUOUS PRN
Status: DISCONTINUED | OUTPATIENT
Start: 2021-08-18 | End: 2021-08-18

## 2021-08-18 RX ORDER — PANTOPRAZOLE SODIUM 40 MG/1
40 TABLET, DELAYED RELEASE ORAL DAILY
Status: DISCONTINUED | OUTPATIENT
Start: 2021-08-18 | End: 2021-08-20 | Stop reason: HOSPADM

## 2021-08-18 RX ORDER — ONDANSETRON 2 MG/ML
4 INJECTION INTRAMUSCULAR; INTRAVENOUS EVERY 6 HOURS PRN
Status: DISCONTINUED | OUTPATIENT
Start: 2021-08-18 | End: 2021-08-20 | Stop reason: HOSPADM

## 2021-08-18 RX ORDER — CHOLECALCIFEROL (VITAMIN D3) 1250 MCG
50000 CAPSULE ORAL
Status: DISCONTINUED | OUTPATIENT
Start: 2021-08-24 | End: 2021-08-20 | Stop reason: HOSPADM

## 2021-08-18 RX ORDER — ACETAMINOPHEN 325 MG/1
975 TABLET ORAL EVERY 6 HOURS
Status: DISCONTINUED | OUTPATIENT
Start: 2021-08-18 | End: 2021-08-20 | Stop reason: HOSPADM

## 2021-08-18 RX ORDER — AMOXICILLIN 250 MG
2 CAPSULE ORAL 2 TIMES DAILY
Status: DISCONTINUED | OUTPATIENT
Start: 2021-08-18 | End: 2021-08-20 | Stop reason: HOSPADM

## 2021-08-18 RX ORDER — BISACODYL 10 MG
10 SUPPOSITORY, RECTAL RECTAL DAILY PRN
Status: DISCONTINUED | OUTPATIENT
Start: 2021-08-18 | End: 2021-08-20 | Stop reason: HOSPADM

## 2021-08-18 RX ORDER — ACETAMINOPHEN 325 MG/1
650 TABLET ORAL EVERY 6 HOURS
Status: DISCONTINUED | OUTPATIENT
Start: 2021-08-21 | End: 2021-08-20 | Stop reason: HOSPADM

## 2021-08-18 RX ORDER — PROPOFOL 10 MG/ML
INJECTION, EMULSION INTRAVENOUS CONTINUOUS PRN
Status: DISCONTINUED | OUTPATIENT
Start: 2021-08-18 | End: 2021-08-18

## 2021-08-18 RX ORDER — AMOXICILLIN 250 MG
1 CAPSULE ORAL 2 TIMES DAILY
Status: DISCONTINUED | OUTPATIENT
Start: 2021-08-18 | End: 2021-08-20 | Stop reason: HOSPADM

## 2021-08-18 RX ORDER — LIDOCAINE HYDROCHLORIDE 20 MG/ML
INJECTION, SOLUTION INFILTRATION; PERINEURAL PRN
Status: DISCONTINUED | OUTPATIENT
Start: 2021-08-18 | End: 2021-08-18

## 2021-08-18 RX ORDER — HYDRALAZINE HYDROCHLORIDE 20 MG/ML
2.5-5 INJECTION INTRAMUSCULAR; INTRAVENOUS EVERY 10 MIN PRN
Status: DISCONTINUED | OUTPATIENT
Start: 2021-08-18 | End: 2021-08-18 | Stop reason: HOSPADM

## 2021-08-18 RX ORDER — PROCHLORPERAZINE MALEATE 10 MG
10 TABLET ORAL EVERY 6 HOURS PRN
Status: DISCONTINUED | OUTPATIENT
Start: 2021-08-18 | End: 2021-08-20 | Stop reason: HOSPADM

## 2021-08-18 RX ORDER — ONDANSETRON 4 MG/1
4 TABLET, ORALLY DISINTEGRATING ORAL EVERY 30 MIN PRN
Status: DISCONTINUED | OUTPATIENT
Start: 2021-08-18 | End: 2021-08-18 | Stop reason: HOSPADM

## 2021-08-18 RX ORDER — FENTANYL CITRATE 0.05 MG/ML
25 INJECTION, SOLUTION INTRAMUSCULAR; INTRAVENOUS EVERY 5 MIN PRN
Status: DISCONTINUED | OUTPATIENT
Start: 2021-08-18 | End: 2021-08-18 | Stop reason: HOSPADM

## 2021-08-18 RX ORDER — NALOXONE HYDROCHLORIDE 0.4 MG/ML
0.4 INJECTION, SOLUTION INTRAMUSCULAR; INTRAVENOUS; SUBCUTANEOUS
Status: DISCONTINUED | OUTPATIENT
Start: 2021-08-18 | End: 2021-08-20 | Stop reason: HOSPADM

## 2021-08-18 RX ORDER — HYDROMORPHONE HCL IN WATER/PF 6 MG/30 ML
0.2 PATIENT CONTROLLED ANALGESIA SYRINGE INTRAVENOUS EVERY 5 MIN PRN
Status: DISCONTINUED | OUTPATIENT
Start: 2021-08-18 | End: 2021-08-18 | Stop reason: HOSPADM

## 2021-08-18 RX ORDER — CEFAZOLIN SODIUM 2 G/100ML
2 INJECTION, SOLUTION INTRAVENOUS
Status: COMPLETED | OUTPATIENT
Start: 2021-08-18 | End: 2021-08-18

## 2021-08-18 RX ORDER — BUPIVACAINE HYDROCHLORIDE AND EPINEPHRINE 5; 5 MG/ML; UG/ML
INJECTION, SOLUTION EPIDURAL; INTRACAUDAL; PERINEURAL
Status: DISCONTINUED
Start: 2021-08-18 | End: 2021-08-18 | Stop reason: HOSPADM

## 2021-08-18 RX ORDER — SODIUM CHLORIDE, SODIUM LACTATE, POTASSIUM CHLORIDE, CALCIUM CHLORIDE 600; 310; 30; 20 MG/100ML; MG/100ML; MG/100ML; MG/100ML
INJECTION, SOLUTION INTRAVENOUS CONTINUOUS PRN
Status: DISCONTINUED | OUTPATIENT
Start: 2021-08-18 | End: 2021-08-18

## 2021-08-18 RX ORDER — MAGNESIUM HYDROXIDE/ALUMINUM HYDROXICE/SIMETHICONE 120; 1200; 1200 MG/30ML; MG/30ML; MG/30ML
30 SUSPENSION ORAL EVERY 4 HOURS PRN
Status: DISCONTINUED | OUTPATIENT
Start: 2021-08-18 | End: 2021-08-20 | Stop reason: HOSPADM

## 2021-08-18 RX ORDER — DEXAMETHASONE SODIUM PHOSPHATE 4 MG/ML
INJECTION, SOLUTION INTRA-ARTICULAR; INTRALESIONAL; INTRAMUSCULAR; INTRAVENOUS; SOFT TISSUE PRN
Status: DISCONTINUED | OUTPATIENT
Start: 2021-08-18 | End: 2021-08-18

## 2021-08-18 RX ORDER — SODIUM CHLORIDE, SODIUM LACTATE, POTASSIUM CHLORIDE, CALCIUM CHLORIDE 600; 310; 30; 20 MG/100ML; MG/100ML; MG/100ML; MG/100ML
INJECTION, SOLUTION INTRAVENOUS CONTINUOUS
Status: DISCONTINUED | OUTPATIENT
Start: 2021-08-18 | End: 2021-08-20 | Stop reason: HOSPADM

## 2021-08-18 RX ORDER — KETOROLAC TROMETHAMINE 15 MG/ML
15 INJECTION, SOLUTION INTRAMUSCULAR; INTRAVENOUS EVERY 6 HOURS
Status: DISCONTINUED | OUTPATIENT
Start: 2021-08-18 | End: 2021-08-20 | Stop reason: HOSPADM

## 2021-08-18 RX ORDER — KETOROLAC TROMETHAMINE 15 MG/ML
15 INJECTION, SOLUTION INTRAMUSCULAR; INTRAVENOUS EVERY 6 HOURS PRN
Status: DISCONTINUED | OUTPATIENT
Start: 2021-08-18 | End: 2021-08-18 | Stop reason: HOSPADM

## 2021-08-18 RX ORDER — ONDANSETRON 2 MG/ML
4 INJECTION INTRAMUSCULAR; INTRAVENOUS EVERY 30 MIN PRN
Status: DISCONTINUED | OUTPATIENT
Start: 2021-08-18 | End: 2021-08-18 | Stop reason: HOSPADM

## 2021-08-18 RX ORDER — METOPROLOL SUCCINATE 25 MG/1
25 TABLET, EXTENDED RELEASE ORAL DAILY
Status: DISCONTINUED | OUTPATIENT
Start: 2021-08-18 | End: 2021-08-20 | Stop reason: HOSPADM

## 2021-08-18 RX ORDER — DOCUSATE SODIUM 100 MG/1
100 CAPSULE, LIQUID FILLED ORAL 2 TIMES DAILY PRN
Status: DISCONTINUED | OUTPATIENT
Start: 2021-08-18 | End: 2021-08-20 | Stop reason: HOSPADM

## 2021-08-18 RX ORDER — ONDANSETRON 4 MG/1
4 TABLET, ORALLY DISINTEGRATING ORAL EVERY 6 HOURS PRN
Status: DISCONTINUED | OUTPATIENT
Start: 2021-08-18 | End: 2021-08-18

## 2021-08-18 RX ORDER — VASOPRESSIN 20 U/ML
INJECTION PARENTERAL
Status: DISCONTINUED
Start: 2021-08-18 | End: 2021-08-18 | Stop reason: HOSPADM

## 2021-08-18 RX ORDER — ONDANSETRON 4 MG/1
4 TABLET, ORALLY DISINTEGRATING ORAL EVERY 6 HOURS PRN
Status: DISCONTINUED | OUTPATIENT
Start: 2021-08-18 | End: 2021-08-20 | Stop reason: HOSPADM

## 2021-08-18 RX ORDER — MAGNESIUM HYDROXIDE 1200 MG/15ML
LIQUID ORAL PRN
Status: DISCONTINUED | OUTPATIENT
Start: 2021-08-18 | End: 2021-08-18 | Stop reason: HOSPADM

## 2021-08-18 RX ORDER — DIPHENHYDRAMINE HCL 25 MG
25 CAPSULE ORAL EVERY 6 HOURS PRN
Status: DISCONTINUED | OUTPATIENT
Start: 2021-08-18 | End: 2021-08-20 | Stop reason: HOSPADM

## 2021-08-18 RX ORDER — FAMOTIDINE 20 MG/1
20 TABLET, FILM COATED ORAL 2 TIMES DAILY
Status: DISCONTINUED | OUTPATIENT
Start: 2021-08-18 | End: 2021-08-20 | Stop reason: HOSPADM

## 2021-08-18 RX ORDER — PROCHLORPERAZINE 25 MG
25 SUPPOSITORY, RECTAL RECTAL EVERY 12 HOURS PRN
Status: DISCONTINUED | OUTPATIENT
Start: 2021-08-18 | End: 2021-08-20 | Stop reason: HOSPADM

## 2021-08-18 RX ORDER — ONDANSETRON 2 MG/ML
4 INJECTION INTRAMUSCULAR; INTRAVENOUS EVERY 6 HOURS PRN
Status: DISCONTINUED | OUTPATIENT
Start: 2021-08-18 | End: 2021-08-18

## 2021-08-18 RX ORDER — NALOXONE HYDROCHLORIDE 0.4 MG/ML
0.2 INJECTION, SOLUTION INTRAMUSCULAR; INTRAVENOUS; SUBCUTANEOUS
Status: DISCONTINUED | OUTPATIENT
Start: 2021-08-18 | End: 2021-08-20 | Stop reason: HOSPADM

## 2021-08-18 RX ORDER — GLYCOPYRROLATE 0.2 MG/ML
INJECTION, SOLUTION INTRAMUSCULAR; INTRAVENOUS PRN
Status: DISCONTINUED | OUTPATIENT
Start: 2021-08-18 | End: 2021-08-18

## 2021-08-18 RX ORDER — CEFAZOLIN SODIUM 2 G/100ML
2 INJECTION, SOLUTION INTRAVENOUS SEE ADMIN INSTRUCTIONS
Status: DISCONTINUED | OUTPATIENT
Start: 2021-08-18 | End: 2021-08-18 | Stop reason: HOSPADM

## 2021-08-18 RX ORDER — IBUPROFEN 400 MG/1
800 TABLET, FILM COATED ORAL EVERY 6 HOURS
Status: DISCONTINUED | OUTPATIENT
Start: 2021-08-18 | End: 2021-08-20 | Stop reason: HOSPADM

## 2021-08-18 RX ORDER — PROPOFOL 10 MG/ML
INJECTION, EMULSION INTRAVENOUS PRN
Status: DISCONTINUED | OUTPATIENT
Start: 2021-08-18 | End: 2021-08-18

## 2021-08-18 RX ORDER — DIPHENHYDRAMINE HYDROCHLORIDE 50 MG/ML
25 INJECTION INTRAMUSCULAR; INTRAVENOUS EVERY 6 HOURS PRN
Status: DISCONTINUED | OUTPATIENT
Start: 2021-08-18 | End: 2021-08-20 | Stop reason: HOSPADM

## 2021-08-18 RX ORDER — HYDROXYZINE HYDROCHLORIDE 25 MG/1
25 TABLET, FILM COATED ORAL EVERY 6 HOURS PRN
Status: DISCONTINUED | OUTPATIENT
Start: 2021-08-18 | End: 2021-08-20 | Stop reason: HOSPADM

## 2021-08-18 RX ORDER — ONDANSETRON 2 MG/ML
INJECTION INTRAMUSCULAR; INTRAVENOUS PRN
Status: DISCONTINUED | OUTPATIENT
Start: 2021-08-18 | End: 2021-08-18

## 2021-08-18 RX ORDER — LIDOCAINE 40 MG/G
CREAM TOPICAL
Status: DISCONTINUED | OUTPATIENT
Start: 2021-08-18 | End: 2021-08-20 | Stop reason: HOSPADM

## 2021-08-18 RX ORDER — POLYETHYLENE GLYCOL 3350 17 G/17G
17 POWDER, FOR SOLUTION ORAL DAILY
Status: DISCONTINUED | OUTPATIENT
Start: 2021-08-18 | End: 2021-08-20 | Stop reason: HOSPADM

## 2021-08-18 RX ORDER — OXYCODONE HYDROCHLORIDE 5 MG/1
5 TABLET ORAL EVERY 4 HOURS PRN
Status: DISCONTINUED | OUTPATIENT
Start: 2021-08-18 | End: 2021-08-19

## 2021-08-18 RX ORDER — ACETAMINOPHEN 325 MG/1
975 TABLET ORAL ONCE
Status: COMPLETED | OUTPATIENT
Start: 2021-08-18 | End: 2021-08-18

## 2021-08-18 RX ORDER — FENTANYL CITRATE 50 UG/ML
INJECTION, SOLUTION INTRAMUSCULAR; INTRAVENOUS PRN
Status: DISCONTINUED | OUTPATIENT
Start: 2021-08-18 | End: 2021-08-18

## 2021-08-18 RX ORDER — POLYETHYLENE GLYCOL 3350 17 G/17G
17 POWDER, FOR SOLUTION ORAL DAILY PRN
Status: DISCONTINUED | OUTPATIENT
Start: 2021-08-19 | End: 2021-08-20 | Stop reason: HOSPADM

## 2021-08-18 RX ADMIN — HYDROMORPHONE HYDROCHLORIDE 0.2 MG: 0.2 INJECTION, SOLUTION INTRAMUSCULAR; INTRAVENOUS; SUBCUTANEOUS at 12:42

## 2021-08-18 RX ADMIN — PANTOPRAZOLE SODIUM 40 MG: 40 TABLET, DELAYED RELEASE ORAL at 14:45

## 2021-08-18 RX ADMIN — ROCURONIUM BROMIDE 10 MG: 10 INJECTION INTRAVENOUS at 10:34

## 2021-08-18 RX ADMIN — HYDROMORPHONE HYDROCHLORIDE 0.2 MG: 0.2 INJECTION, SOLUTION INTRAMUSCULAR; INTRAVENOUS; SUBCUTANEOUS at 12:05

## 2021-08-18 RX ADMIN — HYDROMORPHONE HYDROCHLORIDE 0.5 MG: 1 INJECTION, SOLUTION INTRAMUSCULAR; INTRAVENOUS; SUBCUTANEOUS at 08:56

## 2021-08-18 RX ADMIN — SODIUM CHLORIDE, POTASSIUM CHLORIDE, SODIUM LACTATE AND CALCIUM CHLORIDE: 600; 310; 30; 20 INJECTION, SOLUTION INTRAVENOUS at 23:55

## 2021-08-18 RX ADMIN — PROPOFOL 200 MG: 10 INJECTION, EMULSION INTRAVENOUS at 08:12

## 2021-08-18 RX ADMIN — SODIUM CHLORIDE, POTASSIUM CHLORIDE, SODIUM LACTATE AND CALCIUM CHLORIDE: 600; 310; 30; 20 INJECTION, SOLUTION INTRAVENOUS at 07:45

## 2021-08-18 RX ADMIN — HYDROMORPHONE HYDROCHLORIDE 0.5 MG: 1 INJECTION, SOLUTION INTRAMUSCULAR; INTRAVENOUS; SUBCUTANEOUS at 08:31

## 2021-08-18 RX ADMIN — HYDROMORPHONE HYDROCHLORIDE 0.3 MG: 1 INJECTION, SOLUTION INTRAMUSCULAR; INTRAVENOUS; SUBCUTANEOUS at 09:18

## 2021-08-18 RX ADMIN — DEXMEDETOMIDINE 8 MCG: 100 INJECTION, SOLUTION, CONCENTRATE INTRAVENOUS at 08:24

## 2021-08-18 RX ADMIN — MIDAZOLAM 2 MG: 1 INJECTION INTRAMUSCULAR; INTRAVENOUS at 07:46

## 2021-08-18 RX ADMIN — PHENYLEPHRINE HYDROCHLORIDE 100 MCG: 10 INJECTION INTRAVENOUS at 10:46

## 2021-08-18 RX ADMIN — FAMOTIDINE 20 MG: 20 TABLET ORAL at 21:37

## 2021-08-18 RX ADMIN — PHENAZOPYRIDINE HYDROCHLORIDE 200 MG: 200 TABLET ORAL at 06:30

## 2021-08-18 RX ADMIN — CEFAZOLIN SODIUM 2 G: 2 INJECTION, SOLUTION INTRAVENOUS at 07:46

## 2021-08-18 RX ADMIN — KETOROLAC TROMETHAMINE 15 MG: 15 INJECTION, SOLUTION INTRAMUSCULAR; INTRAVENOUS at 12:27

## 2021-08-18 RX ADMIN — Medication: at 14:47

## 2021-08-18 RX ADMIN — DEXAMETHASONE SODIUM PHOSPHATE 4 MG: 4 INJECTION, SOLUTION INTRA-ARTICULAR; INTRALESIONAL; INTRAMUSCULAR; INTRAVENOUS; SOFT TISSUE at 08:22

## 2021-08-18 RX ADMIN — FENTANYL CITRATE 25 MCG: 50 INJECTION, SOLUTION INTRAMUSCULAR; INTRAVENOUS at 12:05

## 2021-08-18 RX ADMIN — PHENYLEPHRINE HYDROCHLORIDE 100 MCG: 10 INJECTION INTRAVENOUS at 10:24

## 2021-08-18 RX ADMIN — HYDROMORPHONE HYDROCHLORIDE 0.2 MG: 1 INJECTION, SOLUTION INTRAMUSCULAR; INTRAVENOUS; SUBCUTANEOUS at 09:10

## 2021-08-18 RX ADMIN — SENNOSIDES AND DOCUSATE SODIUM 1 TABLET: 8.6; 5 TABLET ORAL at 21:37

## 2021-08-18 RX ADMIN — FENTANYL CITRATE 25 MCG: 50 INJECTION, SOLUTION INTRAMUSCULAR; INTRAVENOUS at 12:24

## 2021-08-18 RX ADMIN — METOPROLOL SUCCINATE 25 MG: 25 TABLET, EXTENDED RELEASE ORAL at 14:45

## 2021-08-18 RX ADMIN — HYDROMORPHONE HYDROCHLORIDE 0.2 MG: 0.2 INJECTION, SOLUTION INTRAMUSCULAR; INTRAVENOUS; SUBCUTANEOUS at 13:05

## 2021-08-18 RX ADMIN — NEOSTIGMINE METHYLSULFATE 4 MG: 1 INJECTION, SOLUTION INTRAVENOUS at 11:18

## 2021-08-18 RX ADMIN — DEXMEDETOMIDINE 12 MCG: 100 INJECTION, SOLUTION, CONCENTRATE INTRAVENOUS at 07:48

## 2021-08-18 RX ADMIN — PROPOFOL 50 MCG/KG/MIN: 10 INJECTION, EMULSION INTRAVENOUS at 08:17

## 2021-08-18 RX ADMIN — LIDOCAINE HYDROCHLORIDE 80 MG: 20 INJECTION, SOLUTION INFILTRATION; PERINEURAL at 08:12

## 2021-08-18 RX ADMIN — ONDANSETRON 4 MG: 2 INJECTION INTRAMUSCULAR; INTRAVENOUS at 17:07

## 2021-08-18 RX ADMIN — ROCURONIUM BROMIDE 50 MG: 10 INJECTION INTRAVENOUS at 08:12

## 2021-08-18 RX ADMIN — ALBUMIN HUMAN: 0.05 INJECTION, SOLUTION INTRAVENOUS at 10:26

## 2021-08-18 RX ADMIN — ROCURONIUM BROMIDE 10 MG: 10 INJECTION INTRAVENOUS at 09:49

## 2021-08-18 RX ADMIN — IBUPROFEN 800 MG: 400 TABLET ORAL at 23:51

## 2021-08-18 RX ADMIN — ROCURONIUM BROMIDE 10 MG: 10 INJECTION INTRAVENOUS at 10:13

## 2021-08-18 RX ADMIN — FENTANYL CITRATE 50 MCG: 50 INJECTION, SOLUTION INTRAMUSCULAR; INTRAVENOUS at 08:12

## 2021-08-18 RX ADMIN — SODIUM CHLORIDE, POTASSIUM CHLORIDE, SODIUM LACTATE AND CALCIUM CHLORIDE: 600; 310; 30; 20 INJECTION, SOLUTION INTRAVENOUS at 14:45

## 2021-08-18 RX ADMIN — ACETAMINOPHEN 975 MG: 325 TABLET, FILM COATED ORAL at 23:51

## 2021-08-18 RX ADMIN — GLYCOPYRROLATE 0.6 MG: 0.2 INJECTION, SOLUTION INTRAMUSCULAR; INTRAVENOUS at 11:18

## 2021-08-18 RX ADMIN — ACETAMINOPHEN 975 MG: 325 TABLET, FILM COATED ORAL at 06:30

## 2021-08-18 RX ADMIN — ONDANSETRON 4 MG: 2 INJECTION INTRAMUSCULAR; INTRAVENOUS at 11:10

## 2021-08-18 RX ADMIN — FENTANYL CITRATE 50 MCG: 50 INJECTION, SOLUTION INTRAMUSCULAR; INTRAVENOUS at 07:48

## 2021-08-18 RX ADMIN — KETOROLAC TROMETHAMINE 15 MG: 15 INJECTION, SOLUTION INTRAMUSCULAR; INTRAVENOUS at 17:09

## 2021-08-18 RX ADMIN — ROCURONIUM BROMIDE 10 MG: 10 INJECTION INTRAVENOUS at 09:22

## 2021-08-18 ASSESSMENT — ENCOUNTER SYMPTOMS: DYSRHYTHMIAS: 0

## 2021-08-18 ASSESSMENT — ACTIVITIES OF DAILY LIVING (ADL)
ADLS_ACUITY_SCORE: 17
ADLS_ACUITY_SCORE: 17

## 2021-08-18 ASSESSMENT — MIFFLIN-ST. JEOR: SCORE: 1427.87

## 2021-08-18 NOTE — BRIEF OP NOTE
[facility]    Brief Operative Note    Pre-operative diagnosis: Abnormal uterine bleeding due to intramural leiomyoma [N93.9, D25.1]  Iron deficiency anemia due to chronic blood loss [D50.0]  Post-operative diagnosis Uterine fibroids. Intra-abdominal adhesions    Procedure: Procedure(s):  DIAGNOSTIC LAPAROSCOPY  OPEN ABDOMINAL SUPRACERVICAL HYSTERECTOMY AND BILATERAL SALPINGECTOMY. LYSIS OF ABDOMINAL ADHESIONS  DIAGNOSTIC CYSTOSCOPY  Surgeon: Surgeon(s) and Role:     * Laura Mixon MD - Primary     * Yudith Lloyd MD - Assisting  Anesthesia: General   Estimated blood loss: 600 ml  Urine Output: 600 ml  Specimens:   ID Type Source Tests Collected by Time Destination   1 : UTERUS, BILATERAL FALLOPIAN TUBES Tissue Uterus, Bilateral Fallopian Tubes SURGICAL PATHOLOGY EXAM Laura Mixon MD 8/18/2021 10:50 AM      Findings:   Multiple fibroid uterus. normal bilteral fallopian tubes with adhesion of the left fallopian tube to the uterus, normal bilateral ovaries, hemorrhagic cyst present on the left ovary. Adhesions of sigmoid colon to the uterine fundus and posterior uterine surface. Dense adhesions of the anterior uterine surface to the anterior peritoneum. Bilateral ureteral jets seen on cystoscopy..  Complications: None.  Dictation Reference Number: [id number]

## 2021-08-18 NOTE — OP NOTE
Procedure Date: 2021    PREOPERATIVE DIAGNOSES:    1.  Abnormal uterine bleeding due to intramural leiomyoma.  2.  Iron deficiency anemia due to chronic blood loss.    POSTOPERATIVE DIAGNOSES:    1.  Abnormal uterine bleeding due to intramural leiomyoma.  2.  Iron deficiency anemia due to chronic blood loss.  3.  Significant intraabdominal adhesions.    PROCEDURE PERFORMED:    1.  Diagnostic laparoscopy.  2.  Open abdominal supracervical hysterectomy, bilateral salpingectomy, lysis of abdominal adhesions.  3.  Diagnostic cystoscopy.    PRIMARY SURGEON:  Laura Mixon MD    ASSISTANT SURGEON:  Yudith Lloyd MD.  Dr. Lloyd was asked to assist with the procedure due to the complicated nature of the procedure and need for an advanced assistant.    ANESTHESIA:  General.    ESTIMATED BLOOD LOSS:  600 mL    URINE OUTPUT:  600 mL    SPECIMENS REMOVED:  Uterus, fibroids and bilateral fallopian tubes.    OPERATIVE FINDINGS:  Multiple uterine fibroids of the uterus.  Normal liver capsule, normal gallbladder dome, significant adhesions between the sigmoid colon and the uterine fundus and the posterior uterine surface.  Significant adhesions between the anterior uterine surface extending to bilateral round ligaments and the anterior peritoneum.  Left hemorrhagic cyst in the left ovary, otherwise normal left and right ovaries, normal bilateral fallopian tubes.  Left fallopian tube adhered to be of uterus and bilateral ureteral jets seen on cystoscopy.    COMPLICATIONS:  None.    INDICATIONS:  Elizabeth is a 46-year-old para 1 who had a long history of uterine fibroids.  She had 2 prior abdominal myomectomies as well as a prior  section.  She had secondary infertility and has had 2 Hysteroscopic myomectomies and she continued to have heavy menses with iron deficiency anemia due to chronic blood loss.  She also did not do well with the Mirena intrauterine device that although was normally located did not stop  the pain or the heavy bleeding.  She desired definitive treatment with a hysterectomy.    DESCRIPTION OF PROCEDURE:  The patient was taken to the operating room.  She received Ancef infection prophylaxis.  She was placed in dorsal supine position in lithotomy.  She was then prepped and draped in the usual sterile fashion with arms tucked.  After final timeout was obtained, a Montez catheter was placed to gravity.  A bivalve speculum was placed in the vaginal vault.  The anterior lip of the cervix was grasped. The cervicovaginal junction was then injected with dilute vasopressin as well as with lidocaine plus epinephrine.  The tenaculum as well as the speculum were removed after the Evin Baltazaron uterine manipulator was placed.  The patient was then placed in low lithotomy.  Sterile technique was obtained.  The skin above the umbilicus was infiltrated with lidocaine and direct cutdown was then performed to the abdominal cavity.  Of note, the fascia was significantly attenuated where after the skin was opened the S retractor was able to enter the abdominal cavity without any cutting needed.  No trauma was noted at the site of entry.  The PneumoLiner trocar was then placed.  The abdomen was insufflated and the patient was placed in Trendelenburg position.  Upon examination, the liver capsule as well as the gallbladder dome appeared to be normal.  The right ovary was able to be visualized.  The left ovary was significantly obscured by adhesions between the anterior uterine surface to the anterior peritoneum.  There was epiploica and sigmoid colon adhered to the uterine fundus as well as the posterior uterine surface.  Because of the significantly obscured anatomy the uterine size and the inability to successfully and safely lysed the adhesions laparoscopically decision was made to perform an open procedure.  After the trocars were removed, the abdomen was desufflated and the trocar sites were closed the supraumbilical was  closed at the level of the fascia with 0 Vicryl and the skin was reapproximated using 4-0 Vicryl for 2 port sites.  At the site of her prior vertical skin scar the prior scar was excised.  This was then carried down to the level of the fascia.  The peritoneum was entered into very carefully. With peritoneal entry initially, the Barrett O retractor was placed.  When this was not giving adequate visualization due to significant scar tissue that was present the Joshua retractor was placed and with a bladder blade as well.  Firstly, the bowel was sequentially dissected using a combination of sharp and blunt dissection to dissect to the level of the filmy adhesions from the uterine fundus and the posterior uterine surface.  This took a significant portion of the operative time due to the amount of adhesions that were noted.  No bowel injury was noted at the end of the dissection.  Next, the anatomy was significantly obscured due to the anterior adhesions where the vesicouterine reflection was not able to be clearly visualized.  Therefore, the right adnexa, which was much more easily identifiable was identified.  The fallopian tube was  from the uterus.  The right uteroovarian was identified, ligated and cut with the LigaSure device.  Next, the round ligament was able to be identified and a bladder flap was created on the right side and carried towards the left side with significant development of the bladder flap on the right and ensuring to be as medial as possible to the uterus.  The right uterine vessels were ligated and cut.  The bladder flap was then carried further to the left side, which aided in further dissection on that left side.  Upon further dissection on the left side, the left adnexa was noted to be retroperitonealized.  This peritoneum was entered into and the left fallopian tube was actually ligated away from the left ovary as it was significantly adhered to the uterus.  Next, the bladder flap  was further developed in sequential fashion on the left side using the already established plane from the right and upon freeing up the uterus anteriorly and posteriorly, the uterus was able to be exteriorized out of the abdomen and this allowed the left adnexa to become more visible.  The left uteroovarian was then identified and ligated.  Upon further development of the left bladder flap the left uterine vessels were identified and ligated as well.  At this point, the uterus was then transected from the cervix using the Bovie.  The cervix was then oversewn with 2-0 Vicryl in a running locked fashion for hemostasis.  Next, the right fallopian tube was identified and able to be dissected away from the right ovary.  The left fallopian tube was already on the specimen itself.  The peritoneum surrounding the left ovary was noted to be bleeding; therefore, this was oversewn using 3-0 Vicryl.  The abdomen was irrigated and with good hemostasis noted at the pedicles, Surgicel powder was applied to the cervical stump that had been oversewn as well as the right and left ovarian peritoneum.  Next, all sponges and instruments were then removed from the abdominal cavity with sufficient hemostasis.  The fascia was reapproximated using 0 looped PDS in a running fashion.  The subcutaneous tissue was reapproximated using 2-0 plain gut in interrupted fashion.  The skin was then reapproximated using 3-0 Monocryl in a subcuticular fashion.  Attention was turned back to the perineum.  The Montez catheter was initially removed and kept sterile.  The 30-degree cystoscope was then introduced into the bladder dome.  The bladder was distended with saline and bilateral ureteral jets were seen from bilateral ureteral orifices several times.  At this point, the cystoscope was removed and the Montez catheter was replaced.  The patient tolerated the procedure well.  The case was made significantly challenging due to significant abdominal  adhesions.  All instrument counts were noted to be correct.  The patient was taken to recovery room in stable condition.    Laura Mixon MD        D: 2021   T: 2021   MT: [id number]    Name:     ZACHARY FLORES  MRN:      [id number]        Account:        [id number]   :      1975           Procedure Date: 2021     Document: [id number]

## 2021-08-18 NOTE — OR NURSING
Updated Dr Schneider, patient progressing, BP and capno improving, patient remains drowsy but when I wake her rating pain 8/10.  Will continue to monitor.

## 2021-08-18 NOTE — ANESTHESIA CARE TRANSFER NOTE
Patient: Elizabeth Rivero    Procedure(s):  DIAGNOSTIC LAPAROSCOPY  OPEN ABDOMINAL HYSTERECTOMY AND BILATERAL SALPINGECTOMY  DIAGNOSTIC CYSTOSCOPY    Diagnosis: Abnormal uterine bleeding due to intramural leiomyoma [N93.9, D25.1]  Iron deficiency anemia due to chronic blood loss [D50.0]  Diagnosis Additional Information: No value filed.    Anesthesia Type:   General     Note:    Oropharynx: oropharynx clear of all foreign objects and spontaneously breathing  Level of Consciousness: awake  Oxygen Supplementation: face mask  Level of Supplemental Oxygen (L/min / FiO2): 6  Independent Airway: airway patency satisfactory and stable  Dentition: dentition unchanged  Vital Signs Stable: post-procedure vital signs reviewed and stable  Report to RN Given: handoff report given  Patient transferred to: PACU  Comments: Suctioned, spont resp ,lifts head  > 5 sec. Extubated with immediate exchange, to PACU, report to RN.  Handoff Report: Identifed the Patient, Identified the Reponsible Provider, Reviewed the pertinent medical history, Discussed the surgical course, Reviewed Intra-OP anesthesia mangement and issues during anesthesia, Set expectations for post-procedure period and Allowed opportunity for questions and acknowledgement of understanding      Vitals:  Vitals Value Taken Time   BP     Temp     Pulse     Resp     SpO2 99 % 08/18/21 1151   Vitals shown include unvalidated device data.    Electronically Signed By: JEAN CARLOS Martinez CRNA  August 18, 2021  11:52 AM

## 2021-08-18 NOTE — ANESTHESIA POSTPROCEDURE EVALUATION
Patient: Elizabeth Rivero    Procedure(s):  DIAGNOSTIC LAPAROSCOPY  OPEN ABDOMINAL HYSTERECTOMY AND BILATERAL SALPINGECTOMY  DIAGNOSTIC CYSTOSCOPY    Diagnosis:Abnormal uterine bleeding due to intramural leiomyoma [N93.9, D25.1]  Iron deficiency anemia due to chronic blood loss [D50.0]  Diagnosis Additional Information: No value filed.    Anesthesia Type:  General    Note:  Disposition: Inpatient   Postop Pain Control: Uneventful            Sign Out: Well controlled pain   PONV: No   Neuro/Psych: Uneventful            Sign Out: Acceptable/Baseline neuro status   Airway/Respiratory: Uneventful            Sign Out: Acceptable/Baseline resp. status   CV/Hemodynamics: Uneventful            Sign Out: Acceptable CV status   Other NRE: NONE   DID A NON-ROUTINE EVENT OCCUR? No           Last vitals:  Vitals Value Taken Time   /94 08/18/21 1400   Temp 36.6  C (97.8  F) 08/18/21 1250   Pulse 92 08/18/21 1400   Resp 15 08/18/21 1400   SpO2 99 % 08/18/21 1401   Vitals shown include unvalidated device data.    Electronically Signed By: Emmett Schneider MD  August 18, 2021  3:20 PM

## 2021-08-18 NOTE — PLAN OF CARE
Alert and oriented x4. VSS. Pain controled with PCA. Nausea- Zofran given x1. Has not gotten out of bed yet. Montez patent. Incision covered with pressure dressing. Has not passed flatus yet. Plan to continue to monitor tonight.

## 2021-08-18 NOTE — ANESTHESIA PROCEDURE NOTES
Airway       Patient location during procedure: OR       Procedure Start/Stop Times: 8/18/2021 8:15 AM  Staff -        Anesthesiologist:  Emmett Schneider MD       CRNA: Laura Dalton APRN CRNA       Other Anesthesia Staff: Carol Nelson       Performed By: SRNA  Consent for Airway        Urgency: elective  Indications and Patient Condition       Indications for airway management: esteban-procedural       Induction type:intravenous       Mask difficulty assessment: 2 - vent by mask + OA or adjuvant +/- NMBA    Final Airway Details       Final airway type: endotracheal airway       Successful airway: ETT - single  Endotracheal Airway Details        ETT size (mm): 7.0       Cuffed: yes       Successful intubation technique: direct laryngoscopy       DL Blade Type: Patel 2       Grade View of Cords: 2       Adjucts: stylet       Measured from: gums/teeth       Secured at (cm): 21       Bite block used: None    Post intubation assessment        Placement verified by: capnometry, equal breath sounds and chest rise        Number of attempts at approach: 1       Secured with: pink tape       Ease of procedure: easy       Dentition: Intact and Unchanged

## 2021-08-18 NOTE — DISCHARGE INSTRUCTIONS
Today you were given 975 mg of Tylenol at 6:30 AM. The recommended daily maximum dose is 4000 mg.

## 2021-08-18 NOTE — ANESTHESIA PREPROCEDURE EVALUATION
Anesthesia Pre-Procedure Evaluation    Patient: Elizabeth Rivero   MRN: [id number] : 1975        Preoperative Diagnosis: Abnormal uterine bleeding due to intramural leiomyoma [N93.9, D25.1]  Iron deficiency anemia due to chronic blood loss [D50.0]   Procedure : Procedure(s):  LAPAROSCOPIC SUPRACERVICAL HYSTERECTOMY AND BILATERAL SALPINGECTOMY  POSSIBLE OPEN ABDOMINAL HYSTERECTOMY AND BILATERAL SALPINGECTOMY  DIAGNOSTIC CYSTOSCOPY     Past Medical History:   Diagnosis Date     Anemia      External hemorrhoids      Gastroesophageal reflux disease without esophagitis 2020     Hemorrhoids      Infertility, female      Leiomyoma of uterus      Skin lesion       Past Surgical History:   Procedure Laterality Date      SECTION  2003     GYN SURGERY  10/2014, 2002    Abdominal Myomectomies x 2     HYSTEROSCOPY       OPERATIVE HYSTEROSCOPY WITH MORCELLATOR N/A 2018    Procedure: OPERATIVE HYSTEROSCOPY WITH MORCELLATOR FOR MYOMECTOMY(MYOSURE);  Surgeon: Laura Mixon MD;  Location: [facility]     OPERATIVE HYSTEROSCOPY WITH MORCELLATOR N/A 2019    Procedure: OPERATIVE HYSTEROSCOPY WITH MYOSURE;  Surgeon: Laura Mixon MD;  Location:  OR      No Known Allergies   Social History     Tobacco Use     Smoking status: Never Smoker     Smokeless tobacco: Never Used   Substance Use Topics     Alcohol use: Yes     Alcohol/week: 0.0 standard drinks     Comment: rare      Wt Readings from Last 1 Encounters:   21 80.3 kg (177 lb)        Anesthesia Evaluation            ROS/MED HX  ENT/Pulmonary:  - neg pulmonary ROS     Neurologic:  - neg neurologic ROS     Cardiovascular:  - neg cardiovascular ROS  (-) FLORES and arrhythmias   METS/Exercise Tolerance: >4 METS    Hematologic:     (+) anemia,     Musculoskeletal:       GI/Hepatic:     (+) GERD,     Renal/Genitourinary:  - neg Renal ROS     Endo:  - neg endo ROS     Psychiatric/Substance Use:       Infectious Disease:  -  neg infectious disease ROS     Malignancy:       Other:            Physical Exam    Airway        Mallampati: I   TM distance: > 3 FB   Neck ROM: full   Mouth opening: > 3 cm    Respiratory Devices and Support         Dental  no notable dental history         Cardiovascular   cardiovascular exam normal          Pulmonary   pulmonary exam normal                OUTSIDE LABS:  CBC:   Lab Results   Component Value Date    WBC 4.0 08/18/2021    WBC 3.3 (L) 07/12/2021    HGB 11.2 (L) 08/18/2021    HGB 10.1 (L) 07/12/2021    HCT 36.3 08/18/2021    HCT 32.7 (L) 07/12/2021     08/18/2021     (L) 07/12/2021     BMP:   Lab Results   Component Value Date     07/12/2021     02/08/2021    POTASSIUM 4.1 07/12/2021    POTASSIUM 3.9 02/08/2021    CHLORIDE 105 07/12/2021    CHLORIDE 107 02/08/2021    CO2 25 07/12/2021    CO2 24 02/08/2021    BUN 13 07/12/2021    BUN 7 02/08/2021    CR 0.97 07/12/2021    CR 0.77 02/08/2021    GLC 87 07/12/2021    GLC 89 02/08/2021     COAGS: No results found for: PTT, INR, FIBR  POC:   Lab Results   Component Value Date    HCG Negative 02/08/2021     HEPATIC:   Lab Results   Component Value Date    ALBUMIN 3.7 07/12/2021    PROTTOTAL 7.1 07/12/2021    ALT 16 07/12/2021    AST 20 07/12/2021    ALKPHOS 75 07/12/2021    BILITOTAL 0.4 07/12/2021     OTHER:   Lab Results   Component Value Date    A1C 4.8 02/08/2021    JANNA 8.6 07/12/2021    TSH 4.87 (H) 07/12/2021    T4 1.01 07/12/2021       Anesthesia Plan    ASA Status:  2   NPO Status:  NPO Appropriate    Anesthesia Type: General.     - Airway: ETT   Induction: Intravenous, Propofol.   Maintenance: Balanced.        Consents    Anesthesia Plan(s) and associated risks, benefits, and realistic alternatives discussed. Questions answered and patient/representative(s) expressed understanding.     - Discussed with:  Patient         Postoperative Care    Pain management: IV analgesics, Multi-modal analgesia.   PONV prophylaxis:  Ondansetron (or other 5HT-3), Dexamethasone or Solumedrol, Background Propofol Infusion     Comments:         H&P reviewed: Unable to attach H&P to encounter due to EHR limitations. H&P Update: appropriate H&P reviewed, patient examined. No interval changes since H&P (within 30 days).         Emmett Schneider MD

## 2021-08-19 LAB
ANION GAP SERPL CALCULATED.3IONS-SCNC: 2 MMOL/L (ref 3–14)
BUN SERPL-MCNC: 5 MG/DL (ref 7–30)
CALCIUM SERPL-MCNC: 8.5 MG/DL (ref 8.5–10.1)
CHLORIDE BLD-SCNC: 109 MMOL/L (ref 94–109)
CO2 SERPL-SCNC: 29 MMOL/L (ref 20–32)
CREAT SERPL-MCNC: 0.88 MG/DL (ref 0.52–1.04)
GFR SERPL CREATININE-BSD FRML MDRD: 79 ML/MIN/1.73M2
GLUCOSE BLD-MCNC: 103 MG/DL (ref 70–99)
GLUCOSE BLDC GLUCOMTR-MCNC: 100 MG/DL (ref 70–99)
HGB BLD-MCNC: 9.6 G/DL (ref 11.7–15.7)
PATH REPORT.COMMENTS IMP SPEC: NORMAL
PATH REPORT.COMMENTS IMP SPEC: NORMAL
PATH REPORT.FINAL DX SPEC: NORMAL
PATH REPORT.GROSS SPEC: NORMAL
PHOTO IMAGE: NORMAL
POTASSIUM BLD-SCNC: 3.6 MMOL/L (ref 3.4–5.3)
SODIUM SERPL-SCNC: 140 MMOL/L (ref 133–144)

## 2021-08-19 PROCEDURE — 36415 COLL VENOUS BLD VENIPUNCTURE: CPT | Performed by: OBSTETRICS & GYNECOLOGY

## 2021-08-19 PROCEDURE — 120N000001 HC R&B MED SURG/OB

## 2021-08-19 PROCEDURE — 250N000013 HC RX MED GY IP 250 OP 250 PS 637: Performed by: OBSTETRICS & GYNECOLOGY

## 2021-08-19 PROCEDURE — 80048 BASIC METABOLIC PNL TOTAL CA: CPT | Performed by: OBSTETRICS & GYNECOLOGY

## 2021-08-19 PROCEDURE — 258N000003 HC RX IP 258 OP 636: Performed by: OBSTETRICS & GYNECOLOGY

## 2021-08-19 PROCEDURE — 85018 HEMOGLOBIN: CPT | Performed by: OBSTETRICS & GYNECOLOGY

## 2021-08-19 PROCEDURE — 88307 TISSUE EXAM BY PATHOLOGIST: CPT | Mod: 26 | Performed by: PATHOLOGY

## 2021-08-19 PROCEDURE — 250N000011 HC RX IP 250 OP 636: Performed by: OBSTETRICS & GYNECOLOGY

## 2021-08-19 RX ORDER — OXYCODONE HYDROCHLORIDE 5 MG/1
5-10 TABLET ORAL EVERY 4 HOURS PRN
Status: DISCONTINUED | OUTPATIENT
Start: 2021-08-19 | End: 2021-08-20 | Stop reason: HOSPADM

## 2021-08-19 RX ORDER — DOCUSATE SODIUM 100 MG/1
100 CAPSULE, LIQUID FILLED ORAL 2 TIMES DAILY PRN
Qty: 60 CAPSULE | Refills: 0 | Status: SHIPPED | OUTPATIENT
Start: 2021-08-19 | End: 2022-05-19

## 2021-08-19 RX ORDER — METOPROLOL SUCCINATE 25 MG/1
25 TABLET, EXTENDED RELEASE ORAL DAILY
Qty: 60 TABLET | Refills: 0 | Status: SHIPPED | OUTPATIENT
Start: 2021-08-20 | End: 2022-05-19

## 2021-08-19 RX ORDER — OXYCODONE HYDROCHLORIDE 5 MG/1
5-10 TABLET ORAL EVERY 4 HOURS PRN
Qty: 20 TABLET | Refills: 0 | Status: SHIPPED | OUTPATIENT
Start: 2021-08-19 | End: 2022-05-19

## 2021-08-19 RX ORDER — IBUPROFEN 800 MG/1
800 TABLET, FILM COATED ORAL EVERY 6 HOURS
Qty: 90 TABLET | Refills: 0 | Status: SHIPPED | OUTPATIENT
Start: 2021-08-19 | End: 2022-05-19

## 2021-08-19 RX ORDER — ACETAMINOPHEN 500 MG
1000 TABLET ORAL EVERY 8 HOURS PRN
Qty: 60 TABLET | Refills: 0 | Status: SHIPPED | OUTPATIENT
Start: 2021-08-19 | End: 2022-05-19

## 2021-08-19 RX ADMIN — ACETAMINOPHEN 975 MG: 325 TABLET, FILM COATED ORAL at 05:41

## 2021-08-19 RX ADMIN — IBUPROFEN 800 MG: 400 TABLET ORAL at 11:52

## 2021-08-19 RX ADMIN — PANTOPRAZOLE SODIUM 40 MG: 40 TABLET, DELAYED RELEASE ORAL at 08:00

## 2021-08-19 RX ADMIN — FAMOTIDINE 20 MG: 20 TABLET ORAL at 21:52

## 2021-08-19 RX ADMIN — POLYETHYLENE GLYCOL 3350 17 G: 17 POWDER, FOR SOLUTION ORAL at 09:31

## 2021-08-19 RX ADMIN — ACETAMINOPHEN 975 MG: 325 TABLET, FILM COATED ORAL at 17:44

## 2021-08-19 RX ADMIN — OXYCODONE HYDROCHLORIDE 10 MG: 5 TABLET ORAL at 14:30

## 2021-08-19 RX ADMIN — OXYCODONE HYDROCHLORIDE 5 MG: 5 TABLET ORAL at 08:00

## 2021-08-19 RX ADMIN — OXYCODONE HYDROCHLORIDE 10 MG: 5 TABLET ORAL at 19:35

## 2021-08-19 RX ADMIN — FAMOTIDINE 20 MG: 20 TABLET ORAL at 08:00

## 2021-08-19 RX ADMIN — ONDANSETRON 4 MG: 4 TABLET, ORALLY DISINTEGRATING ORAL at 17:47

## 2021-08-19 RX ADMIN — IBUPROFEN 800 MG: 400 TABLET ORAL at 17:44

## 2021-08-19 RX ADMIN — SENNOSIDES AND DOCUSATE SODIUM 1 TABLET: 8.6; 5 TABLET ORAL at 08:00

## 2021-08-19 RX ADMIN — SENNOSIDES AND DOCUSATE SODIUM 1 TABLET: 8.6; 5 TABLET ORAL at 21:52

## 2021-08-19 RX ADMIN — ACETAMINOPHEN 975 MG: 325 TABLET, FILM COATED ORAL at 11:52

## 2021-08-19 RX ADMIN — METOPROLOL SUCCINATE 25 MG: 25 TABLET, EXTENDED RELEASE ORAL at 08:00

## 2021-08-19 RX ADMIN — IBUPROFEN 800 MG: 400 TABLET ORAL at 05:40

## 2021-08-19 RX ADMIN — OXYCODONE HYDROCHLORIDE 5 MG: 5 TABLET ORAL at 09:31

## 2021-08-19 RX ADMIN — IRON SUCROSE 300 MG: 20 INJECTION, SOLUTION INTRAVENOUS at 10:04

## 2021-08-19 ASSESSMENT — ACTIVITIES OF DAILY LIVING (ADL)
ADLS_ACUITY_SCORE: 16

## 2021-08-19 NOTE — PROGRESS NOTES
[facility]ne[facility]          Assessment and Plan:   Assessment:   POD#1 from diagnostic laparoscopy abdominal supracervical hysterectomy, bilateral salpingectomy, cystoscopy and extensive lysis of adhesions  Acute blood loss anemia from surgery--stable  Pain is well controlled. Good urine output no concern for intraabdominal bleeding      Plan:   Advance cares. PCA discontinued. Use oral pain medication  Continue oral metoprolol for stage 1 HTN  Awaiting void.  Encouraged IS use  IV Venofer X 1 today.  Anticipate discharge to home on POD#2            Interval History:   Pain is well controlled despite IV infiltration. Reviewed surgery. No flatus yet. Denies chest pain or shortness of breath. No emesis. No flatus yet          Significant Problems:   None          Review of Systems:   The Review of Systems is negative other than noted in the HPI          Medications:     Current Facility-Administered Medications   Medication     acetaminophen (TYLENOL) tablet 975 mg    Followed by     [START ON 8/21/2021] acetaminophen (TYLENOL) tablet 650 mg     alum & mag hydroxide-simethicone (MAALOX) suspension 30 mL     benzocaine-menthol (CHLORASEPTIC) 6-10 MG lozenge 1 lozenge     bisacodyl (DULCOLAX) Suppository 10 mg     [START ON 8/24/2021] cholecalciferol (VITAMIN D3) 1250 mcg (81354 units) capsule 50,000 Units     diphenhydrAMINE (BENADRYL) capsule 25 mg    Or     diphenhydrAMINE (BENADRYL) injection 25 mg     docusate sodium (COLACE) capsule 100 mg     famotidine (PEPCID) tablet 20 mg    Or     famotidine (PEPCID) injection 20 mg     hydrOXYzine (ATARAX) tablet 25 mg     ketorolac (TORADOL) injection 15 mg    Or     ibuprofen (ADVIL/MOTRIN) tablet 800 mg     iron sucrose (VENOFER) 300 mg in sodium chloride 0.9 % 250 mL intermittent infusion     lactated ringers infusion     lidocaine (LMX4) cream     lidocaine 1 % 0.1-1 mL     magnesium hydroxide (MILK OF MAGNESIA) suspension 30 mL     metoprolol  succinate ER (TOPROL-XL) 24 hr tablet 25 mg     naloxone (NARCAN) injection 0.2 mg    Or     naloxone (NARCAN) injection 0.4 mg    Or     naloxone (NARCAN) injection 0.2 mg    Or     naloxone (NARCAN) injection 0.4 mg     ondansetron (ZOFRAN-ODT) ODT tab 4 mg    Or     ondansetron (ZOFRAN) injection 4 mg     oxyCODONE (ROXICODONE) tablet 5-10 mg     pantoprazole (PROTONIX) EC tablet 40 mg     polyethylene glycol (MIRALAX) Packet 17 g     polyethylene glycol (MIRALAX) Packet 17 g     prochlorperazine (COMPAZINE) injection 10 mg    Or     prochlorperazine (COMPAZINE) tablet 10 mg    Or     prochlorperazine (COMPAZINE) suppository 25 mg     senna-docusate (SENOKOT-S/PERICOLACE) 8.6-50 MG per tablet 1 tablet     senna-docusate (SENOKOT-S/PERICOLACE) 8.6-50 MG per tablet 1 tablet    Or     senna-docusate (SENOKOT-S/PERICOLACE) 8.6-50 MG per tablet 2 tablet     sodium chloride (PF) 0.9% PF flush 3 mL     sodium chloride (PF) 0.9% PF flush 3 mL     sodium phosphate (FLEET ENEMA) 1 enema             Physical Exam:     Patient Vitals for the past 24 hrs:   BP Temp Temp src Pulse Resp SpO2   08/19/21 0757 120/71 -- Oral 70 16 97 %   08/19/21 0000 115/71 99  F (37.2  C) Oral 61 18 99 %   08/18/21 1936 125/76 97.8  F (36.6  C) Oral 82 16 99 %   08/18/21 1550 138/81 -- -- 107 18 96 %   08/18/21 1445 122/75 -- -- 97 16 98 %   08/18/21 1416 137/81 97.9  F (36.6  C) Oral 70 16 95 %   08/18/21 1400 (!) 148/94 -- -- 92 15 99 %   08/18/21 1350 136/86 -- -- 80 11 100 %   08/18/21 1340 (!) 124/95 -- -- 86 12 99 %   08/18/21 1330 137/80 -- -- 75 11 99 %   08/18/21 1320 139/89 -- -- 86 11 100 %   08/18/21 1310 (!) 141/84 -- -- 91 12 99 %   08/18/21 1300 132/86 -- -- 86 13 99 %   08/18/21 1250 133/88 97.8  F (36.6  C) Temporal 87 14 98 %   08/18/21 1240 (!) 142/90 -- -- 76 11 99 %   08/18/21 1230 (!) 141/98 -- -- 78 14 99 %   08/18/21 1220 (!) 138/97 97.9  F (36.6  C) Temporal 89 17 100 %   08/18/21 1210 133/82 97.7  F (36.5  C) Temporal  73 12 97 %   08/18/21 1207 (!) 152/89 -- -- 101 17 98 %   08/18/21 1200 (!) 146/103 -- -- 86 17 100 %   08/18/21 1157 (!) 148/82 97.9  F (36.6  C) -- 98 19 100 %   08/18/21 1152 (!) 162/96 -- -- 98 28 99 %   08/18/21 1150 (!) 173/105 99.2  F (37.3  C) Temporal 100 -- 99 %   GEN: NAD  GI: soft, non-distended. Incision sites are clean, dry and intact with dressing.   MSK: Right forearm and hand edema from infiltration  Ext: non-tender          Data:     Lab Results   Component Value Date    CR 0.82 08/18/2021    CR 0.97 07/12/2021    CR 0.77 02/08/2021     Lab Results   Component Value Date    HGB 9.6 (L) 08/19/2021    HGB 11.2 (L) 08/18/2021    HGB 10.1 (L) 07/12/2021          Laura Mixon MD

## 2021-08-19 NOTE — PLAN OF CARE
VSS on RA. A/O x4. Lung sounds clear. Bowel sounds hypoactive. IV saline locked. Up with SBA. Pt c/o abdominal pain, given scheduled tylenol and ibuprofen and PRN oxycodone. Voiding. Regular diet. IV Iron given.

## 2021-08-19 NOTE — PLAN OF CARE
Current Problem: Pain r/t sx procedure  Management Provided: Scheduled ibuprofen and tylenol given. On PCA pump  Patient response: Verbalized decreased in pain level. Rested well during the night.    Addendum:  Pt loss her IV access. Nurse had to stop the PCA pump. Tried few times to get a peripheral IV access but pt was hard stick. Float resource nurse working at the moment to get PIV access on her. Writer told pt that she has oxycodone orally that she could get if ever needed a strong pain medication. Pt verbalized understanding and told writer she'd call if needed. Writer also removed today her surgical dressing, no active bleeding noted when removed, steri-strips that were left are CDI. FC was also removed, pt tolerated the procedure. Pt due to void.

## 2021-08-20 VITALS
WEIGHT: 177 LBS | TEMPERATURE: 98.6 F | BODY MASS INDEX: 30.22 KG/M2 | HEIGHT: 64 IN | RESPIRATION RATE: 16 BRPM | OXYGEN SATURATION: 96 % | SYSTOLIC BLOOD PRESSURE: 125 MMHG | DIASTOLIC BLOOD PRESSURE: 78 MMHG | HEART RATE: 69 BPM

## 2021-08-20 LAB — GLUCOSE BLDC GLUCOMTR-MCNC: 82 MG/DL (ref 70–99)

## 2021-08-20 PROCEDURE — 250N000013 HC RX MED GY IP 250 OP 250 PS 637: Performed by: OBSTETRICS & GYNECOLOGY

## 2021-08-20 RX ADMIN — FAMOTIDINE 20 MG: 20 TABLET ORAL at 08:02

## 2021-08-20 RX ADMIN — IBUPROFEN 800 MG: 400 TABLET ORAL at 00:01

## 2021-08-20 RX ADMIN — ACETAMINOPHEN 975 MG: 325 TABLET, FILM COATED ORAL at 06:22

## 2021-08-20 RX ADMIN — POLYETHYLENE GLYCOL 3350 17 G: 17 POWDER, FOR SOLUTION ORAL at 08:03

## 2021-08-20 RX ADMIN — SENNOSIDES AND DOCUSATE SODIUM 1 TABLET: 8.6; 5 TABLET ORAL at 08:02

## 2021-08-20 RX ADMIN — ACETAMINOPHEN 975 MG: 325 TABLET, FILM COATED ORAL at 00:01

## 2021-08-20 RX ADMIN — METOPROLOL SUCCINATE 25 MG: 25 TABLET, EXTENDED RELEASE ORAL at 08:02

## 2021-08-20 RX ADMIN — SENNOSIDES AND DOCUSATE SODIUM 1 TABLET: 8.6; 5 TABLET ORAL at 08:01

## 2021-08-20 RX ADMIN — IBUPROFEN 800 MG: 400 TABLET ORAL at 06:22

## 2021-08-20 RX ADMIN — PANTOPRAZOLE SODIUM 40 MG: 40 TABLET, DELAYED RELEASE ORAL at 08:02

## 2021-08-20 RX ADMIN — OXYCODONE HYDROCHLORIDE 10 MG: 5 TABLET ORAL at 07:55

## 2021-08-20 ASSESSMENT — ACTIVITIES OF DAILY LIVING (ADL)
ADLS_ACUITY_SCORE: 16

## 2021-08-20 NOTE — PROGRESS NOTES
Doing well although having some nausea. Voiding, ambulating and pain is well controlled with oral medication. Eating without emesis.   Vital signs reviewed and stable  GEN: NAD  GI: soft and non distended  Anticipate discharge on POD#2 as long as there is no emesis.  Laura Mixon MD

## 2021-08-20 NOTE — PROGRESS NOTES
[facility] Gynecology          Assessment and Plan:   Assessment:   POD#2 from a diagnostic laparoscopy, open abdominal supracervical hysterectomy and bilateral salpingectomy, lysis of adhesions and cystoscopy  Acute blood loss anemia from surgery stable      Plan:   S/P IV Iron X 1. Continue Iron at home for another month.  No flatus, but no concern for ileus. Ok for discharge today as long as there is no emesis with breakfast  Follow up in 2-3 weeks with me in the office            Interval History:   Doing well. Pain is well controlled. Ambulating. Has not passed flatus but feels it rumbling. No emesis. Tolerating meals. Would really like to go home today.          Significant Problems:   None          Review of Systems:   The Review of Systems is negative other than noted in the HPI          Medications:     Current Facility-Administered Medications   Medication     acetaminophen (TYLENOL) tablet 975 mg    Followed by     [START ON 8/21/2021] acetaminophen (TYLENOL) tablet 650 mg     alum & mag hydroxide-simethicone (MAALOX) suspension 30 mL     benzocaine-menthol (CHLORASEPTIC) 6-10 MG lozenge 1 lozenge     bisacodyl (DULCOLAX) Suppository 10 mg     [START ON 8/24/2021] cholecalciferol (VITAMIN D3) 1250 mcg (28382 units) capsule 50,000 Units     diphenhydrAMINE (BENADRYL) capsule 25 mg    Or     diphenhydrAMINE (BENADRYL) injection 25 mg     docusate sodium (COLACE) capsule 100 mg     famotidine (PEPCID) tablet 20 mg    Or     famotidine (PEPCID) injection 20 mg     hydrOXYzine (ATARAX) tablet 25 mg     ketorolac (TORADOL) injection 15 mg    Or     ibuprofen (ADVIL/MOTRIN) tablet 800 mg     lactated ringers infusion     lidocaine (LMX4) cream     lidocaine 1 % 0.1-1 mL     magnesium hydroxide (MILK OF MAGNESIA) suspension 30 mL     metoprolol succinate ER (TOPROL-XL) 24 hr tablet 25 mg     naloxone (NARCAN) injection 0.2 mg    Or     naloxone (NARCAN) injection 0.4 mg    Or     naloxone (NARCAN)  injection 0.2 mg    Or     naloxone (NARCAN) injection 0.4 mg     ondansetron (ZOFRAN-ODT) ODT tab 4 mg    Or     ondansetron (ZOFRAN) injection 4 mg     oxyCODONE (ROXICODONE) tablet 5-10 mg     pantoprazole (PROTONIX) EC tablet 40 mg     polyethylene glycol (MIRALAX) Packet 17 g     polyethylene glycol (MIRALAX) Packet 17 g     prochlorperazine (COMPAZINE) injection 10 mg    Or     prochlorperazine (COMPAZINE) tablet 10 mg    Or     prochlorperazine (COMPAZINE) suppository 25 mg     senna-docusate (SENOKOT-S/PERICOLACE) 8.6-50 MG per tablet 1 tablet     senna-docusate (SENOKOT-S/PERICOLACE) 8.6-50 MG per tablet 1 tablet    Or     senna-docusate (SENOKOT-S/PERICOLACE) 8.6-50 MG per tablet 2 tablet     sodium chloride (PF) 0.9% PF flush 3 mL     sodium chloride (PF) 0.9% PF flush 3 mL     sodium phosphate (FLEET ENEMA) 1 enema     GEN: NAD  GI: soft, non-distended. Incision sites are clean, dry and intact  Ext: non-tender          Data:   All laboratory data reviewed  -  -     Laura Mixon MD

## 2021-08-20 NOTE — PROGRESS NOTES
VS WNL. A/Ox4. Incisions with dried blood, unchanged throughout shift. Pain controlled with Tylenol and ibuprofen. Up independently. Diet regular. BS hypoactive. Flatus negative. Voiding adequately. LS CTA. Patient initially declined nausea, then reported intermittent nausea at 0500. Dianna aromatherapy appeared to be therapeutic. Abdomen soft, rounded, and tender in the midline.

## 2021-08-20 NOTE — PLAN OF CARE
A/O x4. AVSS. Pain managed with oxycodone. Discussed discharge instructions with pt and spouse, verbalized understanding. Pt discharged to home with belongings. Transportation provided by spouse.

## 2021-08-23 NOTE — DISCHARGE SUMMARY
[facility] Discharge Summary    Elizabeth Rivero MRN# [id number]   Age: 46 year old YOB: 1975     Date of Admission:  8/18/2021  Date of Discharge::  8/20/2021 11:08 AM  Admitting Physician:  Laura Mixon MD  Discharge Physician:  Laura Mixon MD     Home clinic: [facility]          Admission Diagnoses:   Symptomatic Uterine fibroids  Iron Deficiency Anemia          Discharge Diagnosis:   Post operative          Procedures:   Abdominal supracervical Hysterectomy and bilateral salpingectomy and cystoscopy  Diagnostic Laparoscopy           Medications Prior to Admission:   Oral Iron        Discharge Medications:     Discharge Medication List as of 8/20/2021 10:36 AM      START taking these medications    Details   acetaminophen (TYLENOL) 500 MG tablet Take 2 tablets (1,000 mg) by mouth every 8 hours as needed for mild pain, Disp-60 tablet, R-0, E-Prescribe      docusate sodium (COLACE) 100 MG capsule Take 1 capsule (100 mg) by mouth 2 times daily as needed for constipation, Disp-60 capsule, R-0, E-Prescribe      ibuprofen (ADVIL/MOTRIN) 800 MG tablet Take 1 tablet (800 mg) by mouth every 6 hours, Disp-90 tablet, R-0, E-Prescribe      metoprolol succinate ER (TOPROL-XL) 25 MG 24 hr tablet Take 1 tablet (25 mg) by mouth daily, Disp-60 tablet, R-0, E-Prescribe      oxyCODONE (ROXICODONE) 5 MG tablet Take 1-2 tablets (5-10 mg) by mouth every 4 hours as needed for moderate to severe pain, Disp-20 tablet, R-0, E-Prescribe         CONTINUE these medications which have NOT CHANGED    Details   Bioflavonoid Products (VITAMIN C) CHEW Historical      Ferrous Sulfate (IRON SUPPLEMENT PO) Take 325 mg by mouth daily , Historical      omeprazole (PRILOSEC) 20 MG DR capsule Take 1 capsule (20 mg) by mouth daily, Disp-30 capsule, R-3, E-Prescribe      vitamin D3 (CHOLECALCIFEROL) 1.25 MG (60050 UT) capsule Take 1 capsule (50,000 Units) by mouth every 7 days, Disp-6  capsule, R-0, E-Prescribe                   Consultations:   No consultations were requested during this admission          Brief History of  Admission:   Elizabeth was admitted for a scheduled hysterectomy           Hospital Course:   The patient's hospital course was unremarkable.  She recovered as anticipated and experienced no post-operative complications.  On discharge, her pain was well controlled. Voiding without difficulty.  Ambulating well and tolerating a normal diet.  No fever or significant wound drainage.  Discharged on POD#2    Hemoglobin   Date Value Ref Range Status   08/19/2021 9.6 (L) 11.7 - 15.7 g/dL Final   08/18/2021 11.2 (L) 11.7 - 15.7 g/dL Final   02/08/2021 7.5 (LL) 11.7 - 15.7 g/dL Final     Comment:     Critical Value called to and read back by  Dr. Mixon at 1504 on [id number] by Deborah PALOMO MLT  Results confirmed by repeat test     02/11/2020 11.1 (L) 11.7 - 15.7 g/dL Final           Discharge Instructions and Follow-Up:   Discharge diet: Regular   Discharge activity: Lifting restricted to 25 pounds   Discharge follow-up: Follow up with me in 3 weeks   Wound care: Keep wound clean and dry           Discharge Disposition:   Discharged to home        Laura Mixon MD

## 2021-08-30 ENCOUNTER — TELEPHONE (OUTPATIENT)
Dept: OBGYN | Facility: CLINIC | Age: 46
End: 2021-08-30

## 2021-08-30 NOTE — TELEPHONE ENCOUNTER
There was extensive scar tissue resection during her surgery so this is likely referred pain. There are no vaginal stiches so it's all coming from her bowel. If not already using, I recommend using docusate twice daily scheduled rather than as needed. She can come in for a pelvic exam to be on the safe side.

## 2021-08-30 NOTE — TELEPHONE ENCOUNTER
Informed pt of Dr Mixon's response below.  Pt is taking the docusate sodium BID every day not as prn. Stools soft. She will continue BID.  Discussed the healing process and then scheduled for office visit for pelvic exam per DR ACOSTA's recommendations below for tomorrow.    Stacy Macedo RN on 8/30/2021 at 2:28 PM

## 2021-08-30 NOTE — TELEPHONE ENCOUNTER
Patient's  with patient on speaker phone calling for post op questions. Surgery was 8/18 for:    DIAGNOSTIC LAPAROSCOPY (N/A Abdomen)   OPEN ABDOMINAL HYSTERECTOMY AND BILATERAL SALPINGECTOMY (N/A Abdomen)   DIAGNOSTIC CYSTOSCOPY (N/A Urethra)  Procedures         Post Op Concerns: When patient has a BM she has extreme pain in her vagina. Urinating seems to be okay. This has been going on since 8/22. Patient denies constipation symptoms including hard stools or blood. Pain is otherwise manageable aside from this. Patient said the pain is every time she has a BM and describes this as crampy. As soon as she is done with BM the pain goes away.       Post Op appt is scheduled for: 9/23

## 2021-08-30 NOTE — TELEPHONE ENCOUNTER
Denies bleeding. Pain is controlled between stools. Denies experiencing constipation or hard stools. Stools pass without straining and are soft. Experiencing a vaginal pain w stool passage and even when passing gas.  O/w no other complaints but asking Dr Mixon if normal or any recommendations.    tSacy Macedo RN on 8/30/2021 at 1:56 PM

## 2021-08-31 ENCOUNTER — OFFICE VISIT (OUTPATIENT)
Dept: OBGYN | Facility: CLINIC | Age: 46
End: 2021-08-31
Payer: COMMERCIAL

## 2021-08-31 ENCOUNTER — TRANSFERRED RECORDS (OUTPATIENT)
Dept: HEALTH INFORMATION MANAGEMENT | Facility: CLINIC | Age: 46
End: 2021-08-31

## 2021-08-31 VITALS
WEIGHT: 168 LBS | HEIGHT: 64 IN | BODY MASS INDEX: 28.68 KG/M2 | DIASTOLIC BLOOD PRESSURE: 68 MMHG | SYSTOLIC BLOOD PRESSURE: 94 MMHG | HEART RATE: 76 BPM

## 2021-08-31 DIAGNOSIS — Z90.710 S/P HYSTERECTOMY: ICD-10-CM

## 2021-08-31 DIAGNOSIS — R19.8 PAIN WITH BOWEL MOVEMENTS: ICD-10-CM

## 2021-08-31 DIAGNOSIS — R30.0 DYSURIA: Primary | ICD-10-CM

## 2021-08-31 LAB
ALBUMIN UR-MCNC: NEGATIVE MG/DL
APPEARANCE UR: CLEAR
BACTERIA #/AREA URNS HPF: ABNORMAL /HPF
BILIRUB UR QL STRIP: NEGATIVE
COLOR UR AUTO: YELLOW
GLUCOSE UR STRIP-MCNC: NEGATIVE MG/DL
HGB UR QL STRIP: NEGATIVE
KETONES UR STRIP-MCNC: NEGATIVE MG/DL
LEUKOCYTE ESTERASE UR QL STRIP: NEGATIVE
NITRATE UR QL: NEGATIVE
PH UR STRIP: 5.5 [PH] (ref 5–7)
RBC #/AREA URNS AUTO: ABNORMAL /HPF
SP GR UR STRIP: 1.02 (ref 1–1.03)
SQUAMOUS #/AREA URNS AUTO: ABNORMAL /LPF
UROBILINOGEN UR STRIP-ACNC: 0.2 E.U./DL
WBC #/AREA URNS AUTO: ABNORMAL /HPF

## 2021-08-31 PROCEDURE — 99024 POSTOP FOLLOW-UP VISIT: CPT | Performed by: OBSTETRICS & GYNECOLOGY

## 2021-08-31 PROCEDURE — 87086 URINE CULTURE/COLONY COUNT: CPT | Performed by: OBSTETRICS & GYNECOLOGY

## 2021-08-31 PROCEDURE — 81001 URINALYSIS AUTO W/SCOPE: CPT | Performed by: OBSTETRICS & GYNECOLOGY

## 2021-08-31 RX ORDER — NITROFURANTOIN 25; 75 MG/1; MG/1
100 CAPSULE ORAL 2 TIMES DAILY
Qty: 14 CAPSULE | Refills: 0 | Status: SHIPPED | OUTPATIENT
Start: 2021-08-31 | End: 2021-09-07

## 2021-08-31 ASSESSMENT — MIFFLIN-ST. JEOR: SCORE: 1387.04

## 2021-08-31 NOTE — PROGRESS NOTES
SUBJECTIVE:                                                   Elizabeth Rivero is a 46 year old female who presents to clinic today for the following health issue(s):  Patient presents with:  Vaginal Problem: Patient states she has severe vaginal sharp pain when she has a BM and when she is urinating.       Additional information: Open abdominal hysterectomy & bilateral salpingectomy on 2021.     HPI:  Elizabeth presents with concern about pain with bowel movement. She had a supracervical hysterectomy on . There was extensive scar tissue dissection. She had been having daily bowel movements and then 5 days ago she developed severe vaginal pain during bowel movements. She has been taking the stool softener twice a day. No diarrhea. The pain feels like cramping. Since today she started to also have pain at the end of voiding similar to the vaginal pain with bowel movements. She denies any nausea or vomiting. No fevers or chills. No bloating. No blood in her stools.     Patient's last menstrual period was 2021 (lmp unknown)..     Patient is not sexually active, .  Using hysterectomy for contraception.    reports that she has never smoked. She has never used smokeless tobacco.    STD testing offered?  Declined    Health maintenance updated:  yes    Today's PHQ-2 Score:   PHQ-2 (  [employer]) 3/7/2016   Q1: Little interest or pleasure in doing things 0   Q2: Feeling down, depressed or hopeless 0   PHQ-2 Score 0     Today's PHQ-9 Score:   PHQ-9 SCORE 2021   PHQ-9 Total Score 0     Today's MILEY-7 Score:   MILEY-7 SCORE 2021   Total Score 0       Problem list and histories reviewed & adjusted, as indicated.  Additional history: as documented.    Patient Active Problem List   Diagnosis     Intramural leiomyoma of uterus     Gastroesophageal reflux disease without esophagitis     Iron deficiency anemia     Abnormal uterine bleeding due to intramural leiomyoma     S/P hysterectomy     Past Surgical  History:   Procedure Laterality Date      SECTION  2003     CYSTOSCOPY N/A 2021    Procedure: DIAGNOSTIC CYSTOSCOPY;  Surgeon: Laura Mixon MD;  Location:  OR     GYN SURGERY  10/2014, 2002    Abdominal Myomectomies x 2     HYSTERECTOMY TOTAL ABDOMINAL N/A 2021    Procedure: OPEN ABDOMINAL HYSTERECTOMY AND BILATERAL SALPINGECTOMY;  Surgeon: Laura Mixon MD;  Location:  OR     HYSTEROSCOPY       LAPAROSCOPY DIAGNOSTIC (GYN) N/A 2021    Procedure: DIAGNOSTIC LAPAROSCOPY;  Surgeon: Laura Mixon MD;  Location:  OR     OPERATIVE HYSTEROSCOPY WITH MORCELLATOR N/A 2018    Procedure: OPERATIVE HYSTEROSCOPY WITH MORCELLATOR FOR MYOMECTOMY(MYOSURE);  Surgeon: Laura Mixon MD;  Location: [facility]     OPERATIVE HYSTEROSCOPY WITH MORCELLATOR N/A 2019    Procedure: OPERATIVE HYSTEROSCOPY WITH MYOSURE;  Surgeon: Laura Mixon MD;  Location:  OR      Social History     Tobacco Use     Smoking status: Never Smoker     Smokeless tobacco: Never Used   Substance Use Topics     Alcohol use: Yes     Alcohol/week: 0.0 standard drinks     Comment: rare      Problem (# of Occurrences) Relation (Name,Age of Onset)    Diabetes (1) Maternal Grandmother    Hyperlipidemia (2) Mother, Brother    Hypertension (2) Mother, Father    No Known Problems (4) Sister, Maternal Grandfather, Paternal Grandmother, Other            Current Outpatient Medications   Medication Sig     acetaminophen (TYLENOL) 500 MG tablet Take 2 tablets (1,000 mg) by mouth every 8 hours as needed for mild pain     Bioflavonoid Products (VITAMIN C) CHEW      docusate sodium (COLACE) 100 MG capsule Take 1 capsule (100 mg) by mouth 2 times daily as needed for constipation     Ferrous Sulfate (IRON SUPPLEMENT PO) Take 325 mg by mouth daily      ibuprofen (ADVIL/MOTRIN) 800 MG tablet Take 1 tablet (800 mg) by mouth every 6 hours     metoprolol succinate  "ER (TOPROL-XL) 25 MG 24 hr tablet Take 1 tablet (25 mg) by mouth daily     omeprazole (PRILOSEC) 20 MG DR capsule Take 1 capsule (20 mg) by mouth daily     oxyCODONE (ROXICODONE) 5 MG tablet Take 1-2 tablets (5-10 mg) by mouth every 4 hours as needed for moderate to severe pain     vitamin D3 (CHOLECALCIFEROL) 1.25 MG (94225 UT) capsule Take 1 capsule (50,000 Units) by mouth every 7 days     No current facility-administered medications for this visit.     No Known Allergies    ROS:  12 point review of systems negative other than symptoms noted below or in the HPI.  Genitourinary: vaginal pain        OBJECTIVE:     BP 94/68 (BP Location: Right arm, Patient Position: Sitting, Cuff Size: Adult Regular)   Pulse 76   Ht 1.626 m (5' 4\")   Wt 76.2 kg (168 lb)   LMP 08/03/2021 (LMP Unknown)   Breastfeeding No   BMI 28.84 kg/m    Body mass index is 28.84 kg/m .    Exam:  Constitutional:  Appearance: Well nourished, well developed alert, in no acute distress  Gastrointestinal:  Abdomen is soft and non-distended. Incision sites are still covered with steris. These are removed. No purulence at the incision site and no tenderness to palpation. No rebound or guarding noted.   Skin: General Inspection:  No rashes present, no lesions present, no areas of discoloration.  Neurologic:  Mental Status:  Oriented X3.   Normal strength and tone, sensory exam grossly normal, mentation intact and speech normal.    Psychiatric:  Mentation appears normal and affect normal/bright.   Pelvic: some pain on insertion of the speculum. No purulent discharge noted. Cervix appears normal. On digital exam there is pain on palpation of the anterior vaginal wall. No pain on palpation of the posterior vaginal wall (bordering the rectum) on palpation of the area around the cervix there is significant tenderness.      ASSESSMENT/PLAN:                                                        ICD-10-CM    1. Dysuria  R30.0 UA with Microscopic - lab " collect     Urine Culture Aerobic Bacterial     UA with Microscopic - lab collect     Urine Culture Aerobic Bacterial     Urine Microscopic Exam   2. Pain with bowel movements  R19.8    3. S/P hysterectomy  Z90.710      Will rule out a UTI with an initial urinalysis and urine culture. Low threshold to start macrobid in the interim.  Her vaginal pain with bowel movements is likely due to raw surfaces from surgery being irritated with peristalsis. Will send for a pelvic ultrasound to rule out a hematoma or abscess although she has no signs of systemic infection. I recommended simethicone use three times a day in addition to docusate to lessen her pain.    Laura Mixon MD  [facility] [place]

## 2021-08-31 NOTE — PROGRESS NOTES
Pelvic ultrasound results as well as the images were reviewed. No hematoma seen. Pain is likely from post-operative raw edges. Discussed macrobid as well. Can start it ahead of the urine culture results.  Will follow up in September. Will likely stop the metoprolol at that time if normotensive.  Laura Mixon MD

## 2021-09-01 LAB — BACTERIA UR CULT: NO GROWTH

## 2021-09-10 ENCOUNTER — TELEPHONE (OUTPATIENT)
Dept: OBGYN | Facility: CLINIC | Age: 46
End: 2021-09-10

## 2021-09-10 NOTE — TELEPHONE ENCOUNTER
Type of Paperwork received:  disability     Date Rcvd:  9/10/2021    Rcvd From (Company name): [employer]    Provider:  Oral Luevano on Provider Cart Date:  9/10/2021

## 2021-09-23 ENCOUNTER — OFFICE VISIT (OUTPATIENT)
Dept: OBGYN | Facility: CLINIC | Age: 46
End: 2021-09-23
Payer: COMMERCIAL

## 2021-09-23 VITALS — DIASTOLIC BLOOD PRESSURE: 76 MMHG | BODY MASS INDEX: 30.04 KG/M2 | SYSTOLIC BLOOD PRESSURE: 118 MMHG | WEIGHT: 175 LBS

## 2021-09-23 DIAGNOSIS — Z90.710 S/P HYSTERECTOMY: Primary | ICD-10-CM

## 2021-09-23 DIAGNOSIS — I10 ESSENTIAL HYPERTENSION: ICD-10-CM

## 2021-09-23 LAB — HGB BLD-MCNC: 11.9 G/DL (ref 11.7–15.7)

## 2021-09-23 PROCEDURE — 99024 POSTOP FOLLOW-UP VISIT: CPT | Performed by: OBSTETRICS & GYNECOLOGY

## 2021-09-23 PROCEDURE — 82728 ASSAY OF FERRITIN: CPT | Performed by: OBSTETRICS & GYNECOLOGY

## 2021-09-23 PROCEDURE — 85018 HEMOGLOBIN: CPT | Performed by: OBSTETRICS & GYNECOLOGY

## 2021-09-23 PROCEDURE — 80048 BASIC METABOLIC PNL TOTAL CA: CPT | Performed by: OBSTETRICS & GYNECOLOGY

## 2021-09-23 PROCEDURE — 36415 COLL VENOUS BLD VENIPUNCTURE: CPT | Performed by: OBSTETRICS & GYNECOLOGY

## 2021-09-23 RX ORDER — HYDROCHLOROTHIAZIDE 12.5 MG/1
12.5 TABLET ORAL DAILY
Qty: 30 TABLET | Refills: 11 | Status: SHIPPED | OUTPATIENT
Start: 2021-09-23 | End: 2022-09-29

## 2021-09-23 NOTE — PROGRESS NOTES
SUBJECTIVE:                                                   Elizabeth Rivero is a 46 year old female who presents to clinic today for the following health issue(s):  Patient presents with:  Post-op Visit: 21 OPEN ABDOMINAL HYSTERECTOMY AND BILATERAL SALPINGECTOMY      HPI:  Post op visit. Feeling better since her last visit. Her constipation has returned but she is managing it well. She has a feeling of incomplete emptying with voiding but no pain. She no longer has urinary leakage that she had prior to surgery. She has been taking the metoprolol and has noticed increased lower extremity edema. She has been having hot flushes but only at night time.     Patient's last menstrual period was 2021 (lmp unknown)..     Patient is not sexually active, .  Using not sexually active for contraception.    reports that she has never smoked. She has never used smokeless tobacco.    STD testing offered?  Declined    Health maintenance updated:  yes    Today's PHQ-2 Score:   PHQ-2 (  [employer]) 3/7/2016   Q1: Little interest or pleasure in doing things 0   Q2: Feeling down, depressed or hopeless 0   PHQ-2 Score 0     Today's PHQ-9 Score:   PHQ-9 SCORE 2021   PHQ-9 Total Score 0     Today's MILEY-7 Score:   MILEY-7 SCORE 2021   Total Score 0       Problem list and histories reviewed & adjusted, as indicated.  Additional history: as documented.    Patient Active Problem List   Diagnosis     Intramural leiomyoma of uterus     Gastroesophageal reflux disease without esophagitis     Iron deficiency anemia     Abnormal uterine bleeding due to intramural leiomyoma     S/P hysterectomy     Past Surgical History:   Procedure Laterality Date      SECTION  2003     CYSTOSCOPY N/A 2021    Procedure: DIAGNOSTIC CYSTOSCOPY;  Surgeon: Laura Mixon MD;  Location: [facility]     GYN SURGERY  10/2014, 2002    Abdominal Myomectomies x 2     HYSTERECTOMY TOTAL ABDOMINAL N/A 2021     Procedure: OPEN ABDOMINAL HYSTERECTOMY AND BILATERAL SALPINGECTOMY;  Surgeon: Laura Mixon MD;  Location:  OR     HYSTEROSCOPY       LAPAROSCOPY DIAGNOSTIC (GYN) N/A 8/18/2021    Procedure: DIAGNOSTIC LAPAROSCOPY;  Surgeon: Laura Mixon MD;  Location:  OR     OPERATIVE HYSTEROSCOPY WITH MORCELLATOR N/A 11/28/2018    Procedure: OPERATIVE HYSTEROSCOPY WITH MORCELLATOR FOR MYOMECTOMY(MYOSURE);  Surgeon: Laura Mixon MD;  [facility]     OPERATIVE HYSTEROSCOPY WITH MORCELLATOR N/A 8/21/2019    Procedure: OPERATIVE HYSTEROSCOPY WITH MYOSURE;  Surgeon: Laura Mixon MD;  Location:  OR      Social History     Tobacco Use     Smoking status: Never Smoker     Smokeless tobacco: Never Used   Substance Use Topics     Alcohol use: Yes     Alcohol/week: 0.0 standard drinks     Comment: rare      Problem (# of Occurrences) Relation (Name,Age of Onset)    Diabetes (1) Maternal Grandmother    Hyperlipidemia (2) Mother, Brother    Hypertension (2) Mother, Father    No Known Problems (4) Sister, Maternal Grandfather, Paternal Grandmother, Other            Current Outpatient Medications   Medication Sig     Bioflavonoid Products (VITAMIN C) CHEW      docusate sodium (COLACE) 100 MG capsule Take 1 capsule (100 mg) by mouth 2 times daily as needed for constipation     Ferrous Sulfate (IRON SUPPLEMENT PO) Take 325 mg by mouth daily      hydrochlorothiazide (HYDRODIURIL) 12.5 MG tablet Take 1 tablet (12.5 mg) by mouth daily     metoprolol succinate ER (TOPROL-XL) 25 MG 24 hr tablet Take 1 tablet (25 mg) by mouth daily     omeprazole (PRILOSEC) 20 MG DR capsule Take 1 capsule (20 mg) by mouth daily     vitamin D3 (CHOLECALCIFEROL) 1.25 MG (58607 UT) capsule Take 1 capsule (50,000 Units) by mouth every 7 days     acetaminophen (TYLENOL) 500 MG tablet Take 2 tablets (1,000 mg) by mouth every 8 hours as needed for mild pain (Patient not taking: Reported on 9/23/2021)      ibuprofen (ADVIL/MOTRIN) 800 MG tablet Take 1 tablet (800 mg) by mouth every 6 hours (Patient not taking: Reported on 9/23/2021)     oxyCODONE (ROXICODONE) 5 MG tablet Take 1-2 tablets (5-10 mg) by mouth every 4 hours as needed for moderate to severe pain (Patient not taking: Reported on 9/23/2021)     No current facility-administered medications for this visit.     No Known Allergies    ROS:  12 point review of systems negative other than symptoms noted below or in the HPI.  No urinary frequency or dysuria, bladder or kidney problems      OBJECTIVE:     /76   Wt 79.4 kg (175 lb)   LMP 08/03/2021 (LMP Unknown)   Breastfeeding No   BMI 30.04 kg/m    Body mass index is 30.04 kg/m .    Exam:  Constitutional:  Appearance: Well nourished, well developed alert, in no acute distress  Gastrointestinal:  Abdominal Examination:  Well healing vertical skin scar. Protruding suture removed from the left lower quadrant. Abdomen nontender to palpation, tone normal without rigidity or guarding, no masses present, umbilicus without lesions; Liver/Spleen:  No hepatomegaly present, liver nontender to palpation; Hernias:  No hernias present  Skin: General Inspection:  No rashes present, no lesions present, no areas of discoloration.  Neurologic:  Mental Status:  Oriented X3.  Normal strength and tone, sensory exam grossly normal, mentation intact and speech normal.    Psychiatric:  Mentation appears normal and affect normal/bright.  No Pelvic Exam performed     In-Clinic Test Results:  Results for orders placed or performed in visit on 09/23/21 (from the past 24 hour(s))   Hemoglobin   Result Value Ref Range    Hemoglobin 11.9 11.7 - 15.7 g/dL       ASSESSMENT/PLAN:                                                        ICD-10-CM    1. S/P hysterectomy  Z90.710 Basic metabolic panel     Hemoglobin     Ferritin     Basic metabolic panel     Hemoglobin     Ferritin   2. Essential hypertension  I10 hydrochlorothiazide  (HYDRODIURIL) 12.5 MG tablet     Elizabeth is healing well from surgery. Will repeat her hemoglobin and BMP. I recommend hydrochlorothiazide for blood pressure control. Will stop the metoprolol as it was only started to reduce her perioperative risk due to essential hypertension. Elizabeth was encouraged to get a PCP for ongoing care and management of her blood pressure. Parameters of less than 130/80 mm Hg was given for home blood pressure monitoring.     Laura Mixon MD  [facility] [place]

## 2021-09-24 LAB — FERRITIN SERPL-MCNC: 56 NG/ML (ref 8–252)

## 2021-10-04 ENCOUNTER — HEALTH MAINTENANCE LETTER (OUTPATIENT)
Age: 46
End: 2021-10-04

## 2021-10-07 LAB
ANION GAP SERPL CALCULATED.3IONS-SCNC: 2 MMOL/L (ref 3–14)
BUN SERPL-MCNC: 9 MG/DL (ref 7–30)
CALCIUM SERPL-MCNC: 9.5 MG/DL (ref 8.5–10.1)
CHLORIDE BLD-SCNC: 106 MMOL/L (ref 94–109)
CO2 SERPL-SCNC: 29 MMOL/L (ref 20–32)
CREAT SERPL-MCNC: 1 MG/DL (ref 0.52–1.04)
GFR SERPL CREATININE-BSD FRML MDRD: 68 ML/MIN/1.73M2
GLUCOSE BLD-MCNC: 69 MG/DL (ref 70–99)
POTASSIUM BLD-SCNC: 4.6 MMOL/L (ref 3.4–5.3)
SODIUM SERPL-SCNC: 137 MMOL/L (ref 133–144)

## 2021-10-19 DIAGNOSIS — K21.9 GASTROESOPHAGEAL REFLUX DISEASE WITHOUT ESOPHAGITIS: ICD-10-CM

## 2022-01-10 DIAGNOSIS — Z13.29 SCREENING FOR THYROID DISORDER: Primary | ICD-10-CM

## 2022-01-18 ENCOUNTER — LAB (OUTPATIENT)
Dept: LAB | Facility: CLINIC | Age: 47
End: 2022-01-18
Payer: COMMERCIAL

## 2022-01-18 DIAGNOSIS — Z13.29 SCREENING FOR THYROID DISORDER: ICD-10-CM

## 2022-01-18 PROCEDURE — 84443 ASSAY THYROID STIM HORMONE: CPT

## 2022-01-18 PROCEDURE — 36415 COLL VENOUS BLD VENIPUNCTURE: CPT

## 2022-01-19 LAB — TSH SERPL DL<=0.005 MIU/L-ACNC: 3.39 MU/L (ref 0.4–4)

## 2022-03-20 ENCOUNTER — HEALTH MAINTENANCE LETTER (OUTPATIENT)
Age: 47
End: 2022-03-20

## 2022-05-15 ENCOUNTER — HEALTH MAINTENANCE LETTER (OUTPATIENT)
Age: 47
End: 2022-05-15

## 2022-05-17 NOTE — PROGRESS NOTES
SUBJECTIVE:                                                   Elizabeth Rivero is a 46 year old female who presents to clinic today for the following health issue(s):  Patient presents with:  Weight Problem: Pt would like to discuss weight gain. She also has some abdominal pain when she urinates her pelvis hurts. She states she also has some lower back pain. States that she also sometimes feels some dizziness.      HPI:  Elizabeth presents with several issues today. The worst of which is weight gain. She continues to gain weight despite modifying how she eats. She is having only one meal per day at about 6 pm. She works from 2 pm until the next morning in two jobs total. She sleeps about 5 hours in the morning. She is too tired and too busy to exercise.    She has improved constipation in terms of hard stools but still only has bowel movements every 4 days.     She has been having light headedness when she goes from sitting to standing and it has not improved. She is not on any antihypertensives currently    She has been having deep pelvic pain with urination that started with her surgery and has not improved.       Patient's last menstrual period was 2021 (lmp unknown)..     Patient is sexually active, .  Using hysterectomy for contraception.    reports that she has never smoked. She has never used smokeless tobacco.    STD testing offered?  Declined    Health maintenance updated:  yes    Today's PHQ-2 Score:   PHQ-2 (  [employer]) 3/7/2016   Q1: Little interest or pleasure in doing things 0   Q2: Feeling down, depressed or hopeless 0   PHQ-2 Score 0     Today's PHQ-9 Score:   PHQ-9 SCORE 2021   PHQ-9 Total Score 0     Today's MILEY-7 Score:   MILEY-7 SCORE 2021   Total Score 0       Problem list and histories reviewed & adjusted, as indicated.  Additional history: as documented.    Patient Active Problem List   Diagnosis     Intramural leiomyoma of uterus     Gastroesophageal reflux disease without  esophagitis     Iron deficiency anemia     Abnormal uterine bleeding due to intramural leiomyoma     S/P hysterectomy     Past Surgical History:   Procedure Laterality Date      SECTION  2003     CYSTOSCOPY N/A 2021    Procedure: DIAGNOSTIC CYSTOSCOPY;  Surgeon: Laura Mixon MD;  Location:  OR     GYN SURGERY  10/2014, 2002    Abdominal Myomectomies x 2     HYSTERECTOMY TOTAL ABDOMINAL N/A 2021    Procedure: OPEN ABDOMINAL HYSTERECTOMY AND BILATERAL SALPINGECTOMY;  Surgeon: Laura Mixon MD;  Location:  OR     HYSTEROSCOPY       LAPAROSCOPY DIAGNOSTIC (GYN) N/A 2021    Procedure: DIAGNOSTIC LAPAROSCOPY;  Surgeon: Laura Mixon MD;  Location:  OR     OPERATIVE HYSTEROSCOPY WITH MORCELLATOR N/A 2018    Procedure: OPERATIVE HYSTEROSCOPY WITH MORCELLATOR FOR MYOMECTOMY(MYOSURE);  Surgeon: Laura Mixon MD;  [facility]:  [facility]     OPERATIVE HYSTEROSCOPY WITH MORCELLATOR N/A 2019    Procedure: OPERATIVE HYSTEROSCOPY WITH MYOSURE;  Surgeon: Laura Mixon MD;  Location:  OR      Social History     Tobacco Use     Smoking status: Never Smoker     Smokeless tobacco: Never Used   Substance Use Topics     Alcohol use: Yes     Alcohol/week: 0.0 standard drinks     Comment: rare      Problem (# of Occurrences) Relation (Name,Age of Onset)    Diabetes (1) Maternal Grandmother    Hyperlipidemia (2) Mother, Brother    Hypertension (2) Mother, Father    No Known Problems (4) Sister, Maternal Grandfather, Paternal Grandmother, Other            Current Outpatient Medications   Medication Sig     omeprazole (PRILOSEC) 20 MG DR capsule Take 1 capsule by mouth once daily     Bioflavonoid Products (VITAMIN C) CHEW  (Patient not taking: Reported on 2022)     Ferrous Sulfate (IRON SUPPLEMENT PO) Take 325 mg by mouth daily  (Patient not taking: Reported on 2022)     hydrochlorothiazide (HYDRODIURIL) 12.5 MG  "tablet Take 1 tablet (12.5 mg) by mouth daily (Patient not taking: Reported on 5/19/2022)     vitamin D3 (CHOLECALCIFEROL) 1.25 MG (52882 UT) capsule Take 1 capsule (50,000 Units) by mouth every 7 days (Patient not taking: Reported on 5/19/2022)     No current facility-administered medications for this visit.     No Known Allergies    ROS:  12 point review of systems negative other than symptoms noted below or in the HPI.  Genitourinary: Pelvic Pain and lower back pain  Neurologic: Dizziness  dysuria      OBJECTIVE:     /80   Ht 1.626 m (5' 4\")   Wt 82.9 kg (182 lb 12.8 oz)   LMP 08/03/2021 (LMP Unknown)   Breastfeeding No   BMI 31.38 kg/m    Body mass index is 31.38 kg/m .    Exam:  Constitutional:  Appearance: Well nourished, well developed alert, in no acute distress  Gastrointestinal:  Abdominal Examination:  Abdomen nontender to palpation, tone normal without rigidity or guarding, no masses present, umbilicus without lesions; Liver/Spleen:  No hepatomegaly present, liver nontender to palpation; Hernias:  No hernias present  Skin: General Inspection:  No rashes present, no lesions present, no areas of discoloration.  Neurologic:  Mental Status:  Oriented X3.  Normal strength and tone, sensory exam grossly normal, mentation intact and speech normal.    Psychiatric:  Mentation appears normal and affect normal/bright.  Pelvic Exam:  External Genitalia:     Normal appearance for age, no discharge present, no tenderness present, no inflammatory lesions present, color normal  Vagina:     Normal vaginal vault without central or paravaginal defects, no discharge present, no inflammatory lesions present, no masses present  Bladder:     Nontender to palpation  Urethra:   Urethral Body:  Urethra palpation normal, urethra structural support normal   Urethral Meatus:  No erythema or lesions present  Cervix:     Appearance healthy, no lesions present, nontender to palpation, no bleeding present  Uterus:  "    Surgically absent  Adnexa:     No adnexal tenderness present, no adnexal masses present  Perineum:     Perineum within normal limits, no evidence of trauma, no rashes or skin lesions present  Genitalia and Groin:     No rashes present, no lesions present, no areas of discoloration, no masses present       In-Clinic Test Results:  Results for orders placed or performed in visit on 05/19/22 (from the past 24 hour(s))   UA with Microscopic - lab collect   Result Value Ref Range    Color Urine Yellow Colorless, Straw, Light Yellow, Yellow    Appearance Urine Clear Clear    Glucose Urine Negative Negative mg/dL    Bilirubin Urine Negative Negative    Ketones Urine Negative Negative mg/dL    Specific Gravity Urine 1.015 1.003 - 1.035    Blood Urine Trace (A) Negative    pH Urine 7.0 5.0 - 7.0    Protein Albumin Urine Negative Negative mg/dL    Urobilinogen Urine 0.2 0.2, 1.0 E.U./dL    Nitrite Urine Negative Negative    Leukocyte Esterase Urine Negative Negative   Urine Microscopic Exam   Result Value Ref Range    RBC Urine 0-2 0-2 /HPF /HPF    WBC Urine 0-5 0-5 /HPF /HPF    Squamous Epithelials Urine Few (A) None Seen /LPF   Hemoglobin   Result Value Ref Range    Hemoglobin 13.0 11.7 - 15.7 g/dL   Hemoglobin A1c   Result Value Ref Range    Hemoglobin A1C 5.6 0.0 - 5.6 %       ASSESSMENT/PLAN:                                                        ICD-10-CM    1. Dysuria  R30.0 UA with Microscopic - lab collect     Urine Culture Aerobic Bacterial - lab collect     UA with Microscopic - lab collect     Urine Culture Aerobic Bacterial - lab collect     Urine Microscopic Exam   2. Light headedness  R42    3. Iron deficiency anemia secondary to inadequate dietary iron intake  D50.8 Hemoglobin     Ferritin     Hemoglobin     Ferritin   4. Screening for endocrine, metabolic and immunity disorder  Z13.29 Comprehensive metabolic panel (BMP + Alb, Alk Phos, ALT, AST, Total. Bili, TP)    Z13.228 Comprehensive metabolic panel  (BMP + Alb, Alk Phos, ALT, AST, Total. Bili, TP)    Z13.0    5. Menopausal syndrome (hot flashes)  N95.1 Follicle stimulating hormone     Estradiol     Follicle stimulating hormone     Estradiol   6. Screening for thyroid disorder  Z13.29 TSH     T3, Free     T4, free     TSH     T3, Free     T4, free   7. Screening for diabetes mellitus  Z13.1 Hemoglobin A1c     Hemoglobin A1c   8. Vitamin D deficiency  E55.9 Vitamin D Deficiency     Vitamin D Deficiency   9. Colon cancer screening  Z12.11 Adult Gastro Ref - Procedure Only   10. Class 1 obesity due to excess calories without serious comorbidity with body mass index (BMI) of 31.0 to 31.9 in adult  E66.09 Comprehensive Weight Management    Z68.31      Will rule out a UTI. If the urine culture is negative will order a CT of the abdomen and pelvis due to the deep pelvic pain    I recommend seeing GI for her chronic constipation as this may be contributing to her pelvic pain. In the meantime a colonoscopy referral was placed.    Will send several labs to investigate light headedness. If all labs are negative will recommend Cardiology evaluation for essential HTN and her light headedness.    Weight loss strategies discussed. I recommend eating more meals during the day and incorporating physical activity even if it is once per week. She asked about oral medication for weight loss and I recommend starting with the weight loss management clinic due to her HTN and questions about Ozempic which I have no experience prescribing.    Laura Mixon MD  [facility] FOR [facility]

## 2022-05-19 ENCOUNTER — OFFICE VISIT (OUTPATIENT)
Dept: OBGYN | Facility: CLINIC | Age: 47
End: 2022-05-19
Payer: COMMERCIAL

## 2022-05-19 VITALS
HEIGHT: 64 IN | SYSTOLIC BLOOD PRESSURE: 132 MMHG | WEIGHT: 182.8 LBS | DIASTOLIC BLOOD PRESSURE: 80 MMHG | BODY MASS INDEX: 31.21 KG/M2

## 2022-05-19 DIAGNOSIS — Z13.228 SCREENING FOR ENDOCRINE, METABOLIC AND IMMUNITY DISORDER: ICD-10-CM

## 2022-05-19 DIAGNOSIS — N95.1 MENOPAUSAL SYNDROME (HOT FLASHES): ICD-10-CM

## 2022-05-19 DIAGNOSIS — Z13.1 SCREENING FOR DIABETES MELLITUS: ICD-10-CM

## 2022-05-19 DIAGNOSIS — R30.0 DYSURIA: Primary | ICD-10-CM

## 2022-05-19 DIAGNOSIS — E66.811 CLASS 1 OBESITY DUE TO EXCESS CALORIES WITHOUT SERIOUS COMORBIDITY WITH BODY MASS INDEX (BMI) OF 31.0 TO 31.9 IN ADULT: ICD-10-CM

## 2022-05-19 DIAGNOSIS — R42 LIGHT HEADEDNESS: ICD-10-CM

## 2022-05-19 DIAGNOSIS — E66.09 CLASS 1 OBESITY DUE TO EXCESS CALORIES WITHOUT SERIOUS COMORBIDITY WITH BODY MASS INDEX (BMI) OF 31.0 TO 31.9 IN ADULT: ICD-10-CM

## 2022-05-19 DIAGNOSIS — D50.8 IRON DEFICIENCY ANEMIA SECONDARY TO INADEQUATE DIETARY IRON INTAKE: ICD-10-CM

## 2022-05-19 DIAGNOSIS — Z13.0 SCREENING FOR ENDOCRINE, METABOLIC AND IMMUNITY DISORDER: ICD-10-CM

## 2022-05-19 DIAGNOSIS — Z13.29 SCREENING FOR ENDOCRINE, METABOLIC AND IMMUNITY DISORDER: ICD-10-CM

## 2022-05-19 DIAGNOSIS — Z13.29 SCREENING FOR THYROID DISORDER: ICD-10-CM

## 2022-05-19 DIAGNOSIS — E55.9 VITAMIN D DEFICIENCY: ICD-10-CM

## 2022-05-19 DIAGNOSIS — Z12.11 COLON CANCER SCREENING: ICD-10-CM

## 2022-05-19 LAB
ALBUMIN UR-MCNC: NEGATIVE MG/DL
APPEARANCE UR: CLEAR
BILIRUB UR QL STRIP: NEGATIVE
COLOR UR AUTO: YELLOW
ESTRADIOL SERPL-MCNC: 55 PG/ML
FSH SERPL-ACNC: 5 IU/L
GLUCOSE UR STRIP-MCNC: NEGATIVE MG/DL
HBA1C MFR BLD: 5.6 % (ref 0–5.6)
HGB BLD-MCNC: 13 G/DL (ref 11.7–15.7)
HGB UR QL STRIP: ABNORMAL
KETONES UR STRIP-MCNC: NEGATIVE MG/DL
LEUKOCYTE ESTERASE UR QL STRIP: NEGATIVE
NITRATE UR QL: NEGATIVE
PH UR STRIP: 7 [PH] (ref 5–7)
RBC #/AREA URNS AUTO: ABNORMAL /HPF
SP GR UR STRIP: 1.01 (ref 1–1.03)
SQUAMOUS #/AREA URNS AUTO: ABNORMAL /LPF
T3FREE SERPL-MCNC: 2.8 PG/ML (ref 2.3–4.2)
UROBILINOGEN UR STRIP-ACNC: 0.2 E.U./DL
WBC #/AREA URNS AUTO: ABNORMAL /HPF

## 2022-05-19 PROCEDURE — 83001 ASSAY OF GONADOTROPIN (FSH): CPT | Performed by: OBSTETRICS & GYNECOLOGY

## 2022-05-19 PROCEDURE — 82306 VITAMIN D 25 HYDROXY: CPT | Performed by: OBSTETRICS & GYNECOLOGY

## 2022-05-19 PROCEDURE — 82728 ASSAY OF FERRITIN: CPT | Performed by: OBSTETRICS & GYNECOLOGY

## 2022-05-19 PROCEDURE — 84439 ASSAY OF FREE THYROXINE: CPT | Performed by: OBSTETRICS & GYNECOLOGY

## 2022-05-19 PROCEDURE — 82670 ASSAY OF TOTAL ESTRADIOL: CPT | Performed by: OBSTETRICS & GYNECOLOGY

## 2022-05-19 PROCEDURE — 81001 URINALYSIS AUTO W/SCOPE: CPT | Performed by: OBSTETRICS & GYNECOLOGY

## 2022-05-19 PROCEDURE — 36415 COLL VENOUS BLD VENIPUNCTURE: CPT | Performed by: OBSTETRICS & GYNECOLOGY

## 2022-05-19 PROCEDURE — 87086 URINE CULTURE/COLONY COUNT: CPT | Performed by: OBSTETRICS & GYNECOLOGY

## 2022-05-19 PROCEDURE — 84443 ASSAY THYROID STIM HORMONE: CPT | Performed by: OBSTETRICS & GYNECOLOGY

## 2022-05-19 PROCEDURE — 80053 COMPREHEN METABOLIC PANEL: CPT | Performed by: OBSTETRICS & GYNECOLOGY

## 2022-05-19 PROCEDURE — 83036 HEMOGLOBIN GLYCOSYLATED A1C: CPT | Performed by: OBSTETRICS & GYNECOLOGY

## 2022-05-19 PROCEDURE — 84481 FREE ASSAY (FT-3): CPT | Performed by: OBSTETRICS & GYNECOLOGY

## 2022-05-19 PROCEDURE — 85018 HEMOGLOBIN: CPT | Performed by: OBSTETRICS & GYNECOLOGY

## 2022-05-19 PROCEDURE — 99214 OFFICE O/P EST MOD 30 MIN: CPT | Performed by: OBSTETRICS & GYNECOLOGY

## 2022-05-20 ENCOUNTER — TELEPHONE (OUTPATIENT)
Dept: GASTROENTEROLOGY | Facility: CLINIC | Age: 47
End: 2022-05-20
Payer: COMMERCIAL

## 2022-05-20 LAB
ALBUMIN SERPL-MCNC: 4 G/DL (ref 3.4–5)
ALP SERPL-CCNC: 80 U/L (ref 40–150)
ALT SERPL W P-5'-P-CCNC: 19 U/L (ref 0–50)
ANION GAP SERPL CALCULATED.3IONS-SCNC: 8 MMOL/L (ref 3–14)
AST SERPL W P-5'-P-CCNC: 12 U/L (ref 0–45)
BILIRUB SERPL-MCNC: 1.2 MG/DL (ref 0.2–1.3)
BUN SERPL-MCNC: 7 MG/DL (ref 7–30)
CALCIUM SERPL-MCNC: 8.9 MG/DL (ref 8.5–10.1)
CHLORIDE BLD-SCNC: 107 MMOL/L (ref 94–109)
CO2 SERPL-SCNC: 25 MMOL/L (ref 20–32)
CREAT SERPL-MCNC: 0.81 MG/DL (ref 0.52–1.04)
DEPRECATED CALCIDIOL+CALCIFEROL SERPL-MC: 36 UG/L (ref 20–75)
FERRITIN SERPL-MCNC: 63 NG/ML (ref 8–252)
GFR SERPL CREATININE-BSD FRML MDRD: 90 ML/MIN/1.73M2
GLUCOSE BLD-MCNC: 83 MG/DL (ref 70–99)
POTASSIUM BLD-SCNC: 3.7 MMOL/L (ref 3.4–5.3)
PROT SERPL-MCNC: 7.9 G/DL (ref 6.8–8.8)
SODIUM SERPL-SCNC: 140 MMOL/L (ref 133–144)
T4 FREE SERPL-MCNC: 0.95 NG/DL (ref 0.76–1.46)
TSH SERPL DL<=0.005 MIU/L-ACNC: 2.5 MU/L (ref 0.4–4)

## 2022-05-20 NOTE — TELEPHONE ENCOUNTER
Screening Questions  BlueKIND OF PREP RedLOCATION [review exclusion criteria] GreenSEDATION TYPE  1. Have you had a positive covid test in the last 90 days? N     2. Do you have a legal guardian or medical Power of ?  Are you able to give consent for your medical care?Y (Sedation review/consideration needed)    3. Are you active on mychart? Y    4. What insurance is in the chart? PO     3.   Ordering/Referring Provider: Laura Mixon MD     4. BMI 31.2 [BMI OVER 40-EXTENDED PREP]  If greater than 40 review exclusion criteria [PAC APPT IF @ [facility]]      5.  Respiratory Screening :  [If yes to any of the following HOSPITAL setting only]     Do you use daily home oxygen? N    Do you have mod to severe Obstructive Sleep Apnea? N  [OKAY @ [facility] SH PH RI]   Do you have Pulmonary Hypertension? N     Do you have UNCONTROLLED asthma? N        6.   Have you had a heart or lung transplant? N      7.   Are you currently on dialysis? N [ If yes, G-PREP & HOSPITAL setting only]     8.   Do you have chronic kidney disease? N [ If yes, G-PREP ]    9.   Have you had a stroke or Transient ischemic attack (TIA - aka  mini stroke ) within 6 months?  N (If yes, please review exclusion criteria)    10.   In the past 6 months, have you had any heart related issues including cardiomyopathy or heart attack? N           If yes, did it require cardiac stenting or other implantable device? N      11.   Do you have any implantable devices in your body (pacemaker, defib, LVAD)? N (If yes, please review exclusion criteria)    12.   Do you take nitroglycerin? N           If yes, how often? N  (if yes, HOSPITAL setting ONLY)    13.   Are you currently taking any blood thinners? N           [IF YES, INFORM PATIENT TO FOLLOW UP W/ ORDERING PROVIDER FOR BRIDGING INSTRUCTIONS]     14.   Do you have a diagnosis of diabetes? N   [ If yes, G-PREP ]    15.   [FEMALES] Are you currently pregnant? N    If yes, how many weeks?  N    16.   Are you taking any prescription pain medications on a routine schedule?  N  [ If yes, EXTENDED PREP.] [If yes, MAC]    17.   Do you have any chemical dependencies such as alcohol, street drugs, or methadone?  N [If yes, MAC]    18.   Do you have any history of post-traumatic stress syndrome, severe anxiety or history of psychosis?  N  [If yes, MAC]    19.   Do you transfer independently?  Y    20.  On a regular basis do you go 3-5 days between bowel movements?    [ If yes, EXTENDED PREP.]    21.   Preferred LOCAL Pharmacy for Pre Prescription   [facility] [address] [place] [address] [id number]      Scheduling Details      Caller :  Elizabeth   (Please ask for phone number if not scheduled by patient)    Type of Procedure Scheduled: Lower Endoscopy [Colonoscopy]  Which Colonoscopy Prep was Sent?: EXTENDED  KHORUTS CF PATIENTS & GROEN'S PATIENTS NEEDS EXTENDED PREP  Surgeon: LIZ  Date of Procedure: 7/5  Location:     Sedation Type: CS     Conscious Sedation- Needs  for 6 hours after the procedure  MAC/General-Needs  for 24 hours after procedure    Pre-op Required at [facility] and OR for MAC sedation: N  (advise patient they will need a pre-op prior to procedure -)      Informed patient they will need an adult  Y  Cannot take any type of public or medical transportation alone    Pre-Procedure Cov[facility] or Externally: 7/1    Confirmed Nurse will call to complete assessment Y    Additional comments:

## 2022-05-21 LAB — BACTERIA UR CULT: NO GROWTH

## 2022-07-01 ENCOUNTER — LAB (OUTPATIENT)
Dept: LAB | Facility: CLINIC | Age: 47
End: 2022-07-01
Payer: COMMERCIAL

## 2022-07-01 DIAGNOSIS — Z20.822 ENCOUNTER FOR LABORATORY TESTING FOR COVID-19 VIRUS: ICD-10-CM

## 2022-07-01 LAB — SARS-COV-2 RNA RESP QL NAA+PROBE: NEGATIVE

## 2022-07-01 PROCEDURE — U0003 INFECTIOUS AGENT DETECTION BY NUCLEIC ACID (DNA OR RNA); SEVERE ACUTE RESPIRATORY SYNDROME CORONAVIRUS 2 (SARS-COV-2) (CORONAVIRUS DISEASE [COVID-19]), AMPLIFIED PROBE TECHNIQUE, MAKING USE OF HIGH THROUGHPUT TECHNOLOGIES AS DESCRIBED BY CMS-2020-01-R: HCPCS

## 2022-07-01 PROCEDURE — U0005 INFEC AGEN DETEC AMPLI PROBE: HCPCS

## 2022-07-05 ENCOUNTER — HOSPITAL ENCOUNTER (OUTPATIENT)
Facility: AMBULATORY SURGERY CENTER | Age: 47
Discharge: HOME | End: 2022-07-05
Attending: INTERNAL MEDICINE | Admitting: INTERNAL MEDICINE
Payer: COMMERCIAL

## 2022-07-05 VITALS
RESPIRATION RATE: 16 BRPM | OXYGEN SATURATION: 99 % | TEMPERATURE: 97.7 F | SYSTOLIC BLOOD PRESSURE: 128 MMHG | HEART RATE: 82 BPM | DIASTOLIC BLOOD PRESSURE: 90 MMHG

## 2022-07-05 LAB — COLONOSCOPY: NORMAL

## 2022-07-05 PROCEDURE — 45378 DIAGNOSTIC COLONOSCOPY: CPT

## 2022-07-05 PROCEDURE — G8907 PT DOC NO EVENTS ON DISCHARG: HCPCS

## 2022-07-05 PROCEDURE — G8918 PT W/O PREOP ORDER IV AB PRO: HCPCS

## 2022-07-05 RX ORDER — PROCHLORPERAZINE MALEATE 10 MG
10 TABLET ORAL EVERY 6 HOURS PRN
Status: DISCONTINUED | OUTPATIENT
Start: 2022-07-05 | End: 2022-07-06 | Stop reason: HOSPADM

## 2022-07-05 RX ORDER — NALOXONE HYDROCHLORIDE 0.4 MG/ML
0.4 INJECTION, SOLUTION INTRAMUSCULAR; INTRAVENOUS; SUBCUTANEOUS
Status: DISCONTINUED | OUTPATIENT
Start: 2022-07-05 | End: 2022-07-06 | Stop reason: HOSPADM

## 2022-07-05 RX ORDER — ONDANSETRON 2 MG/ML
4 INJECTION INTRAMUSCULAR; INTRAVENOUS
Status: DISCONTINUED | OUTPATIENT
Start: 2022-07-05 | End: 2022-07-06 | Stop reason: HOSPADM

## 2022-07-05 RX ORDER — ONDANSETRON 2 MG/ML
4 INJECTION INTRAMUSCULAR; INTRAVENOUS EVERY 6 HOURS PRN
Status: DISCONTINUED | OUTPATIENT
Start: 2022-07-05 | End: 2022-07-06 | Stop reason: HOSPADM

## 2022-07-05 RX ORDER — ONDANSETRON 4 MG/1
4 TABLET, ORALLY DISINTEGRATING ORAL EVERY 6 HOURS PRN
Status: DISCONTINUED | OUTPATIENT
Start: 2022-07-05 | End: 2022-07-06 | Stop reason: HOSPADM

## 2022-07-05 RX ORDER — FLUMAZENIL 0.1 MG/ML
0.2 INJECTION, SOLUTION INTRAVENOUS
Status: ACTIVE | OUTPATIENT
Start: 2022-07-05 | End: 2022-07-05

## 2022-07-05 RX ORDER — FENTANYL CITRATE 50 UG/ML
INJECTION, SOLUTION INTRAMUSCULAR; INTRAVENOUS PRN
Status: DISCONTINUED | OUTPATIENT
Start: 2022-07-05 | End: 2022-07-05 | Stop reason: HOSPADM

## 2022-07-05 RX ORDER — NALOXONE HYDROCHLORIDE 0.4 MG/ML
0.2 INJECTION, SOLUTION INTRAMUSCULAR; INTRAVENOUS; SUBCUTANEOUS
Status: DISCONTINUED | OUTPATIENT
Start: 2022-07-05 | End: 2022-07-06 | Stop reason: HOSPADM

## 2022-07-05 RX ORDER — LIDOCAINE 40 MG/G
CREAM TOPICAL
Status: DISCONTINUED | OUTPATIENT
Start: 2022-07-05 | End: 2022-07-06 | Stop reason: HOSPADM

## 2022-07-05 NOTE — H&P
[facility] Anesthesia Pre-op History and Physical    Elizabeth Rivero MRN# [id number]   Age: 47 year old YOB: 1975            Date of Exam 7/5/2022         Primary care provider: [facility]         Chief Complaint and/or Reason for Procedure:     CRC screening - first colonoscopy         Active problem list:     Patient Active Problem List    Diagnosis Date Noted     S/P hysterectomy 08/18/2021     Priority: Medium     Abnormal uterine bleeding due to intramural leiomyoma 07/13/2021     Priority: Medium     Added automatically from request for surgery [id number]       Iron deficiency anemia 02/08/2021     Priority: Medium     Gastroesophageal reflux disease without esophagitis 12/01/2020     Priority: Medium     Intramural leiomyoma of uterus 02/19/2016     Priority: Medium            Medications (include herbals and vitamins):   Any Plavix use in the last 7 days? No     Current Outpatient Medications   Medication Sig     omeprazole (PRILOSEC) 20 MG DR capsule Take 1 capsule by mouth once daily     Ferrous Sulfate (IRON SUPPLEMENT PO) Take 325 mg by mouth daily  (Patient not taking: Reported on 5/19/2022)     hydrochlorothiazide (HYDRODIURIL) 12.5 MG tablet Take 1 tablet (12.5 mg) by mouth daily (Patient not taking: No sig reported)     Current Facility-Administered Medications   Medication     lidocaine (LMX4) kit     lidocaine 1 % 0.1-1 mL     ondansetron (ZOFRAN) injection 4 mg     sodium chloride (PF) 0.9% PF flush 3 mL     sodium chloride (PF) 0.9% PF flush 3 mL             Allergies:    No Known Allergies  Allergy to Latex? No  Allergy to tape?   No  Intolerances:             Physical Exam:   All vitals have been reviewed  Patient Vitals for the past 8 hrs:   BP Temp Temp src Pulse Resp SpO2   07/05/22 0830 116/83 -- -- 82 16 100 %   07/05/22 0825 117/80 -- -- 70 16 100 %   07/05/22 0820 (!) 118/92 -- -- 75 16 100 %   07/05/22 0815 114/76 -- -- 75 16 100 %   07/05/22  0810 (!) 133/92 -- -- 74 16 100 %   07/05/22 0805 (!) 131/95 -- -- 74 16 --   07/05/22 0741 (!) 128/91 97.4  F (36.3  C) Temporal -- 16 97 %     No intake/output data recorded.  Lungs:   No increased work of breathing, good air exchange, clear to auscultation bilaterally, no crackles or wheezing     Cardiovascular:   normal S1 and S2             Lab / Radiology Results:            Anesthetic risk and/or ASA classification:     2  Yazmin Lara, DO

## 2022-09-11 ENCOUNTER — HEALTH MAINTENANCE LETTER (OUTPATIENT)
Age: 47
End: 2022-09-11

## 2022-09-29 ENCOUNTER — OFFICE VISIT (OUTPATIENT)
Dept: FAMILY MEDICINE | Facility: CLINIC | Age: 47
End: 2022-09-29
Payer: COMMERCIAL

## 2022-09-29 VITALS
HEIGHT: 64 IN | WEIGHT: 178.4 LBS | SYSTOLIC BLOOD PRESSURE: 128 MMHG | TEMPERATURE: 98.2 F | OXYGEN SATURATION: 98 % | HEART RATE: 82 BPM | RESPIRATION RATE: 19 BRPM | DIASTOLIC BLOOD PRESSURE: 85 MMHG | BODY MASS INDEX: 30.46 KG/M2

## 2022-09-29 DIAGNOSIS — K21.9 GASTROESOPHAGEAL REFLUX DISEASE WITHOUT ESOPHAGITIS: ICD-10-CM

## 2022-09-29 DIAGNOSIS — E66.09 CLASS 1 OBESITY DUE TO EXCESS CALORIES WITHOUT SERIOUS COMORBIDITY WITH BODY MASS INDEX (BMI) OF 30.0 TO 30.9 IN ADULT: ICD-10-CM

## 2022-09-29 DIAGNOSIS — Z11.4 SCREENING FOR HIV (HUMAN IMMUNODEFICIENCY VIRUS): ICD-10-CM

## 2022-09-29 DIAGNOSIS — Z23 HIGH PRIORITY FOR 2019-NCOV VACCINE: ICD-10-CM

## 2022-09-29 DIAGNOSIS — K21.9 LARYNGOPHARYNGEAL REFLUX: Primary | ICD-10-CM

## 2022-09-29 DIAGNOSIS — Z13.220 LIPID SCREENING: ICD-10-CM

## 2022-09-29 DIAGNOSIS — E66.811 CLASS 1 OBESITY DUE TO EXCESS CALORIES WITHOUT SERIOUS COMORBIDITY WITH BODY MASS INDEX (BMI) OF 30.0 TO 30.9 IN ADULT: ICD-10-CM

## 2022-09-29 DIAGNOSIS — Z12.31 VISIT FOR SCREENING MAMMOGRAM: ICD-10-CM

## 2022-09-29 DIAGNOSIS — Z11.59 NEED FOR HEPATITIS C SCREENING TEST: ICD-10-CM

## 2022-09-29 PROCEDURE — 87389 HIV-1 AG W/HIV-1&-2 AB AG IA: CPT | Performed by: INTERNAL MEDICINE

## 2022-09-29 PROCEDURE — 80061 LIPID PANEL: CPT | Performed by: INTERNAL MEDICINE

## 2022-09-29 PROCEDURE — 91313 COVID-19,PF,MODERNA BIVALENT: CPT | Performed by: INTERNAL MEDICINE

## 2022-09-29 PROCEDURE — 0134A COVID-19,PF,MODERNA BIVALENT: CPT | Performed by: INTERNAL MEDICINE

## 2022-09-29 PROCEDURE — 36415 COLL VENOUS BLD VENIPUNCTURE: CPT | Performed by: INTERNAL MEDICINE

## 2022-09-29 PROCEDURE — 99214 OFFICE O/P EST MOD 30 MIN: CPT | Mod: 25 | Performed by: INTERNAL MEDICINE

## 2022-09-29 PROCEDURE — 86803 HEPATITIS C AB TEST: CPT | Performed by: INTERNAL MEDICINE

## 2022-09-29 RX ORDER — TOPIRAMATE 25 MG/1
TABLET, FILM COATED ORAL
Qty: 53 TABLET | Refills: 0 | Status: SHIPPED | OUTPATIENT
Start: 2022-09-29 | End: 2022-10-27 | Stop reason: SINTOL

## 2022-09-29 ASSESSMENT — PAIN SCALES - GENERAL: PAINLEVEL: SEVERE PAIN (6)

## 2022-09-29 ASSESSMENT — ENCOUNTER SYMPTOMS: COUGH: 1

## 2022-09-29 NOTE — PATIENT INSTRUCTIONS
Additional instructions:    GERD (Gastro Esophageal Reflux Disorder)  Treatment  Anti-reflux medication and dietary precautions are the first line of defense. Functional voice therapy is useful to teach techniques for reducing effortful compensation and instruct the individual in improved vocal hygiene.  At the [facility], we do not hand out a list of foods and beverages that must be avoided. Rather, we educate about types of foods and beverages known to cause reflux and encourage the individual to systematically investigate which foods stimulate their own reflux. Also, the individual is encouraged to manage reflux under the care of a Gastroenterologist (gastrointestinal specialist).  Interested readers are encouraged to look at the website of the [facility] at [facility] for a more in depth discussion of GERD and the voice (see our Links page).  Lifestyle changes that may help reduce symptoms of GERD/LPRD  eat smaller meals more frequently throughout the day, rather than three large meals   elevate the head of your bed 2-3 inches (don't just use extra pillows for your head)   avoid clothes that fit tightly around the waist   avoid lying down within 2-3 hours of eating (don't eat dinner late at night)   Types of foods known to trigger increased stomach acid  spicy food (such as chili or jalapeño peppers, Yoruba or Szechuan spices)   acidic foods such as tomato products or citrus products   greasy foods   caffeine   alcohol   carbonation   roughage, such as popcorn and peanuts, or raw vegetables   dairy products   strong mint such as peppermint candies   chocolate   onion  garlic    Reading this list might make you think you can only eat bread and oatmeal for the rest of your life. Fortunately, most individuals are not triggered by everything on this list. For example, for every person who is lactose intolerant and has problems with dairy products, there may be someone else whose  digestive system is calmed by milk. Also, in our experience, few persons are triggered by peppermints or chocolate. Therefore, we recommend that you experiment with your own dietary habits, changing one food class at a time. Also, if you have recently started taking anti-reflux medications, you may want to wait for several months, to see how the medication works without any change in your dietary habits.

## 2022-09-29 NOTE — PROGRESS NOTES
Assessment & Plan     Elizabeth was seen today for cough and imm/inj.    Diagnoses and all orders for this visit:    Laryngopharyngeal reflux  Comments:  her cough was probably from laryngeal reflux. Omeprazole. life style modification.   Orders:  -     omeprazole (PRILOSEC) 20 MG DR capsule; Take 1 capsule (20 mg) by mouth daily    Screening for HIV (human immunodeficiency virus)  -     HIV Antigen Antibody Combo; Future  -     HIV Antigen Antibody Combo    Need for hepatitis C screening test  -     Hepatitis C Screen Reflex to HCV RNA Quant and Genotype; Future  -     Hepatitis C Screen Reflex to HCV RNA Quant and Genotype    Visit for screening mammogram  -     MA SCREENING DIGITAL BILAT - Future  (s+30); Future    Gastroesophageal reflux disease without esophagitis  -     omeprazole (PRILOSEC) 20 MG DR capsule; Take 1 capsule (20 mg) by mouth daily    High priority for 2019-nCoV vaccine  -     COVID-19,PF,MODERNA BIVALENT 18+Yrs    Lipid screening  -     Lipid panel reflex to direct LDL Fasting; Future  -     Lipid panel reflex to direct LDL Fasting    Class 1 obesity due to excess calories without serious comorbidity with body mass index (BMI) of 30.0 to 30.9 in adult  -     topiramate (TOPAMAX) 25 MG tablet; Take 1 tablet (25 mg) by mouth daily for 7 days, THEN 1 tablet (25 mg) 2 times daily for 23 days.    Other orders  -     REVIEW OF HEALTH MAINTENANCE PROTOCOL ORDERS         Return in about 4 weeks (around 10/27/2022) for Virtual visit on weight and laryngeeal reflux.    Shaina Mei MD PhD  [facility]   Elizabeth is a 47 year old, presenting for the following health issues:  Cough and Imm/Inj (COVID-19 VACCINE)      Cough off and on for 7 years. When it first started, she thought she had a cold but it never went away. It is almost always there when she lays down but less so during the day when she is upright.     Cough not related to eating. But taking omeprazole helps. Dx  "with this about 20 years ago. She didn't want to be dependent on prescription.. she only takes it when she has symptoms.     After her hysterectomy, she didn't feel the acid reflux as much but the night time cough has not subsided.     She works night shifts. She snacks at night. Wanted to take medication to lose weight.     Cough    History of Present Illness       Reason for visit:  Chronic Cough, back pain  Symptom onset:  More than a month  Symptoms include:  Cough  Symptom intensity:  Severe  Symptom progression:  Staying the same  Had these symptoms before:  Yes  Has tried/received treatment for these symptoms:  No  What makes it worse:  Laying down  What makes it better:  No    She eats 2-3 servings of fruits and vegetables daily.She consumes 1 sweetened beverage(s) daily.She exercises with enough effort to increase her heart rate 9 or less minutes per day.  She exercises with enough effort to increase her heart rate 3 or less days per week.   She is taking medications regularly.         Review of Systems   Respiratory: Positive for cough.       Constitutional, HEENT, cardiovascular, pulmonary, gi and gu systems are negative, except as otherwise noted.      Objective    /85 (BP Location: Right arm, Patient Position: Sitting, Cuff Size: Adult Regular)   Pulse 82   Temp 98.2  F (36.8  C) (Oral)   Resp 19   Ht 1.626 m (5' 4\")   Wt 80.9 kg (178 lb 6.4 oz)   LMP 08/03/2021 (LMP Unknown)   SpO2 98%   BMI 30.62 kg/m    Body mass index is 30.62 kg/m .  Physical Exam   GENERAL APPEARANCE: healthy, alert and no distress  RESP: lungs clear to auscultation - no rales, rhonchi or wheezes  CV: regular rates and rhythm, normal S1 S2, no S3 or S4 and no murmur, click or rub            "

## 2022-09-30 LAB
CHOLEST SERPL-MCNC: 192 MG/DL
FASTING STATUS PATIENT QL REPORTED: YES
HCV AB SERPL QL IA: NONREACTIVE
HDLC SERPL-MCNC: 58 MG/DL
HIV 1+2 AB+HIV1 P24 AG SERPL QL IA: NONREACTIVE
LDLC SERPL CALC-MCNC: 126 MG/DL
NONHDLC SERPL-MCNC: 134 MG/DL
TRIGL SERPL-MCNC: 42 MG/DL

## 2022-10-11 NOTE — RESULT ENCOUNTER NOTE
Dear Elizabeth,   Your recent test results showed the following:  -- Lipid panel is borderline elevated. Prescription medication is not needed at this time. Healthy eating with low fat low cholesterol diet, exercise 30 minutes 3-5 times a week will help improve cholesterol. We'll monitor this annually with your physical.     Please call or Mychart to our office if you have further questions.     Shaina Mei MD-PhD

## 2022-10-11 NOTE — RESULT ENCOUNTER NOTE
Dear Elizabeth,   Your recent test result are within acceptable range or at baseline. Please continue with your current plan of care.       Please call or Mychart to our office if you have further questions.     Shaina Mei MD-PhD

## 2022-10-27 ENCOUNTER — VIRTUAL VISIT (OUTPATIENT)
Dept: FAMILY MEDICINE | Facility: CLINIC | Age: 47
End: 2022-10-27
Payer: COMMERCIAL

## 2022-10-27 ENCOUNTER — ANCILLARY PROCEDURE (OUTPATIENT)
Dept: MAMMOGRAPHY | Facility: CLINIC | Age: 47
End: 2022-10-27
Attending: INTERNAL MEDICINE
Payer: COMMERCIAL

## 2022-10-27 ENCOUNTER — LAB (OUTPATIENT)
Dept: LAB | Facility: CLINIC | Age: 47
End: 2022-10-27
Payer: COMMERCIAL

## 2022-10-27 VITALS — BODY MASS INDEX: 30.66 KG/M2 | WEIGHT: 178.6 LBS

## 2022-10-27 DIAGNOSIS — Z12.31 VISIT FOR SCREENING MAMMOGRAM: ICD-10-CM

## 2022-10-27 DIAGNOSIS — R14.2 BELCHING SYMPTOM: ICD-10-CM

## 2022-10-27 DIAGNOSIS — E66.811 CLASS 1 OBESITY DUE TO EXCESS CALORIES WITHOUT SERIOUS COMORBIDITY WITH BODY MASS INDEX (BMI) OF 30.0 TO 30.9 IN ADULT: Primary | ICD-10-CM

## 2022-10-27 DIAGNOSIS — E66.09 CLASS 1 OBESITY DUE TO EXCESS CALORIES WITHOUT SERIOUS COMORBIDITY WITH BODY MASS INDEX (BMI) OF 30.0 TO 30.9 IN ADULT: Primary | ICD-10-CM

## 2022-10-27 DIAGNOSIS — A04.8 H. PYLORI INFECTION: ICD-10-CM

## 2022-10-27 PROCEDURE — 77067 SCR MAMMO BI INCL CAD: CPT | Performed by: RADIOLOGY

## 2022-10-27 PROCEDURE — 77063 BREAST TOMOSYNTHESIS BI: CPT | Performed by: RADIOLOGY

## 2022-10-27 PROCEDURE — 99214 OFFICE O/P EST MOD 30 MIN: CPT | Mod: 95 | Performed by: INTERNAL MEDICINE

## 2022-10-27 PROCEDURE — 87338 HPYLORI STOOL AG IA: CPT

## 2022-10-27 NOTE — PROGRESS NOTES
"Elizabeth is a 47 year old who is being evaluated via a billable video visit.      How would you like to obtain your AVS? MyChart  If the video visit is dropped, the invitation should be resent by: Text to cell phone: [phone]  Will anyone else be joining your video visit? No        Assessment & Plan     Elizabeth was seen today for weight.    Diagnoses and all orders for this visit:    Class 1 obesity due to excess calories without serious comorbidity with body mass index (BMI) of 30.0 to 30.9 in adult  Comments:  stop Topamax. sent Semaglutide. if not covered, can get OTC Manolo. follow up in 4 weeks virtually.   Orders:  -     Semaglutide 3 MG TABS; Take 3 mg by mouth daily    Belching symptom  Comments:  increase Omeprazole to 40 mg. rule out H pylori.   Orders:  -     Helicobacter pylori Antigen Stool; Future     956}     BMI:   Estimated body mass index is 30.66 kg/m  as calculated from the following:    Height as of 9/29/22: 1.626 m (5' 4\").    Weight as of this encounter: 81 kg (178 lb 9.6 oz).   Weight management plan: medication        Return in about 4 weeks (around 11/24/2022) for Virtual visit.    Shaina Mei MD PhD  [facility]   Elizabeth is a 47 year old, presenting for the following health issues:  weight      HPI     Medication Followup of topamax    Taking Medication as prescribed: yes    Side Effects:  Tingling in fingers, legs and feet    Medication Helping Symptoms:  unsure      PT states that she was 176lb when she started medication and now 178.6 lbs.    Previously tried Meformin, gave her nausea, stomach sick feeling. Her body never adjusted to it.     Taking Omeprazole for laryngeal reflux for 1 month. Last 1-2 weeks, noted a sulfur taste when she burped. Doesn't matter what she eats. She smells this when there is too much acid in her. The cough is better but not gone.     Has more constipation. Drinks a lot of water every day, 60 oz. Not much fiber. Works two " jobs. Not eating regularly.       Review of Systems   Constitutional, HEENT, cardiovascular, pulmonary, gi and gu systems are negative, except as otherwise noted.      Objective    Vitals - Patient Reported  Pain Score: No Pain (0)      Vitals:  No vitals were obtained today due to virtual visit.    Physical Exam   GENERAL: Healthy, alert and no distress  EYES: Eyes grossly normal to inspection.  No discharge or erythema, or obvious scleral/conjunctival abnormalities.  RESP: No audible wheeze, cough, or visible cyanosis.  No visible retractions or increased work of breathing.    SKIN: Visible skin clear. No significant rash, abnormal pigmentation or lesions.  NEURO: Cranial nerves grossly intact.  Mentation and speech appropriate for age.  PSYCH: Mentation appears normal, affect normal/bright, judgement and insight intact, normal speech and appearance well-groomed.        Video-Visit Details    Video Start Time: 9:13 AM    Type of service:  Video Visit    Video End Time:9:40 AM    Originating Location (pt. Location): Home        Distant Location (provider location):  Off-site    Platform used for Video Visit: [employer]

## 2022-10-27 NOTE — PATIENT INSTRUCTIONS
Additional instructions:  If Semaglutide is not covered, you can get over the counter weight loss pill called Manolo.   Take Omeprazole 2 capsules daily (40 mg) for the next 2 weeks.   Stop by lab to get stool testing kit to test for H pylori.

## 2022-11-01 LAB — H PYLORI AG STL QL IA: POSITIVE

## 2022-11-01 RX ORDER — AMOXICILLIN 500 MG/1
1000 CAPSULE ORAL 2 TIMES DAILY
Qty: 56 CAPSULE | Refills: 0 | Status: SHIPPED | OUTPATIENT
Start: 2022-11-01 | End: 2022-11-15

## 2022-11-01 RX ORDER — NICOTINE POLACRILEX 4 MG/1
20 GUM, CHEWING ORAL 2 TIMES DAILY
Qty: 28 TABLET | Refills: 0 | Status: SHIPPED | OUTPATIENT
Start: 2022-11-01 | End: 2022-11-15

## 2022-11-01 RX ORDER — CLARITHROMYCIN 500 MG
500 TABLET ORAL 2 TIMES DAILY
Qty: 28 TABLET | Refills: 0 | Status: SHIPPED | OUTPATIENT
Start: 2022-11-01 | End: 2022-11-15

## 2022-11-01 NOTE — RESULT ENCOUNTER NOTE
Dear Elizabeth,   Your recent test results showed the following:  -- there is H pylori infection in the stomach. This is treated with 3 medications (amoxicillin, Omeprazole, and clarithromycin twice a day) taken for 2 weeks. We'll recheck stool test to be sure the infection has been treated in 6 weeks. After you finish the 2 weeks of the 3 drugs, continue Omeprazole 20 mg once a day until recheck.     Please call or Mychart to our office if you have further questions.     Shaina Mei MD-PhD

## 2022-11-29 ENCOUNTER — TELEPHONE (OUTPATIENT)
Dept: FAMILY MEDICINE | Facility: CLINIC | Age: 47
End: 2022-11-29

## 2022-11-29 DIAGNOSIS — E66.811 CLASS 1 OBESITY DUE TO EXCESS CALORIES WITHOUT SERIOUS COMORBIDITY WITH BODY MASS INDEX (BMI) OF 30.0 TO 30.9 IN ADULT: Primary | ICD-10-CM

## 2022-11-29 DIAGNOSIS — E66.09 CLASS 1 OBESITY DUE TO EXCESS CALORIES WITHOUT SERIOUS COMORBIDITY WITH BODY MASS INDEX (BMI) OF 30.0 TO 30.9 IN ADULT: Primary | ICD-10-CM

## 2022-11-29 NOTE — TELEPHONE ENCOUNTER
Reason for Call:  Medication or medication refill:    Do you use a [facility]?  Name of the pharmacy and phone number for the current request:  [facility] - [place] - [phone]    Name of the medication requested: Semaglutide 3 MG TABS    Other request: Pt is looking to have the injectable sent VS the tablets. Per pt need to send in injectable rx to see if it is covered or not.     Can we leave a detailed message on this number? YES    Phone number patient can be reached at: Home number on file [phone] (home)    Best Time: any    Call taken on 11/29/2022 at 1:30 PM by Stacy Guzman CMA

## 2022-11-30 NOTE — TELEPHONE ENCOUNTER
Pharmacy calling and states ozempic 0.25mg is on backorder from , unsure of when they will get this in.    FYI for provider to discuss further options with pt at appointment tomorrow.   Deborah Barrett RN  [facility]

## 2022-11-30 NOTE — TELEPHONE ENCOUNTER
Let pt know I sent in the injectable. If this is not covered, we'll discuss at your appointment tomorrow regarding semaglutide.

## 2022-12-01 ENCOUNTER — TELEPHONE (OUTPATIENT)
Dept: FAMILY MEDICINE | Facility: CLINIC | Age: 47
End: 2022-12-01

## 2022-12-01 ENCOUNTER — VIRTUAL VISIT (OUTPATIENT)
Dept: FAMILY MEDICINE | Facility: CLINIC | Age: 47
End: 2022-12-01
Payer: COMMERCIAL

## 2022-12-01 DIAGNOSIS — K21.9 GASTROESOPHAGEAL REFLUX DISEASE WITHOUT ESOPHAGITIS: ICD-10-CM

## 2022-12-01 DIAGNOSIS — E66.3 OVERWEIGHT (BMI 25.0-29.9): Primary | ICD-10-CM

## 2022-12-01 PROCEDURE — 99214 OFFICE O/P EST MOD 30 MIN: CPT | Mod: 95 | Performed by: INTERNAL MEDICINE

## 2022-12-01 NOTE — TELEPHONE ENCOUNTER
Called pt and LVM asking her to call back to make an appt for virtual visit in 4 weeks with Dr. Mei for med check.

## 2022-12-01 NOTE — PROGRESS NOTES
"Elizabeth is a 47 year old who is being evaluated via a billable video visit.      How would you like to obtain your AVS? MyChart  If the video visit is dropped, the invitation should be resent by: Text to cell phone: [phone]  Will anyone else be joining your video visit? No        Assessment & Plan     Elizabeth was seen today for recheck medication.    Diagnoses and all orders for this visit:    Overweight (BMI 25.0-29.9)  Comments:  good result with 3 mg of semaglutide. no side effects. increase dose to 7 mg. virtual visit follow up in 4 weeks.   Orders:  -     Semaglutide 7 MG TABS; Take 7 mg by mouth daily    Gastroesophageal reflux disease without esophagitis  Comments:  avoid carbonated water or drinks. can use water flavoring. continue Omeprazole.          Return in about 4 weeks (around 12/29/2022) for Virtual visit.    Shaina Mei MD PhD  [facility]    Teo Johnson is a 47 year old, presenting for the following health issues:  Recheck Medication      History of Present Illness       Reason for visit:  Follow up on medication    She eats 2-3 servings of fruits and vegetables daily.She consumes 1 sweetened beverage(s) daily.She exercises with enough effort to increase her heart rate 9 or less minutes per day.  She exercises with enough effort to increase her heart rate 3 or less days per week.   She is taking medications regularly.     Patient wants to check up on weight loss medication  Lost 5 lbs last month on Rybelsus 3 mg daily.   Current weight this AM: 173.8 lbs.  Goal weight: 140 lb     Cough came back last few days. Still on Omeprazole.  Started drinking zero sugar carbonated water lately.       Review of Systems   Constitutional, HEENT, cardiovascular, pulmonary, gi and gu systems are negative, except as otherwise noted.      Objective    Vitals - Patient Reported  Weight (Patient Reported): 78.8 kg (173 lb 12.8 oz)  Height (Patient Reported): 162.6 cm (5' 4\")  BMI (Based " on Pt Reported Ht/Wt): 29.83  Pain Score: No Pain (0)        Physical Exam   GENERAL: Healthy, alert and no distress  EYES: Eyes grossly normal to inspection.  No discharge or erythema, or obvious scleral/conjunctival abnormalities.  RESP: No audible wheeze, cough, or visible cyanosis.  No visible retractions or increased work of breathing.    SKIN: Visible skin clear. No significant rash, abnormal pigmentation or lesions.  NEURO: Cranial nerves grossly intact.  Mentation and speech appropriate for age.  PSYCH: Mentation appears normal, affect normal/bright, judgement and insight intact, normal speech and appearance well-groomed.        Video-Visit Details    Video Start Time: 9:09 AM    Type of service:  Video Visit    Video End Time:9:22 AM    Originating Location (pt. Location): Home    Distant Location (provider location):  Off-site    Platform used for Video Visit: [employer]

## 2022-12-29 ENCOUNTER — VIRTUAL VISIT (OUTPATIENT)
Dept: FAMILY MEDICINE | Facility: CLINIC | Age: 47
End: 2022-12-29
Payer: COMMERCIAL

## 2022-12-29 DIAGNOSIS — K59.09 CHRONIC CONSTIPATION: ICD-10-CM

## 2022-12-29 DIAGNOSIS — E78.5 HYPERLIPIDEMIA LDL GOAL <160: ICD-10-CM

## 2022-12-29 DIAGNOSIS — E66.3 OVERWEIGHT (BMI 25.0-29.9): Primary | ICD-10-CM

## 2022-12-29 PROCEDURE — 99213 OFFICE O/P EST LOW 20 MIN: CPT | Mod: 95 | Performed by: INTERNAL MEDICINE

## 2022-12-29 RX ORDER — POLYETHYLENE GLYCOL 3350 17 G/17G
1 POWDER, FOR SOLUTION ORAL DAILY
Qty: 1530 G | Refills: 0 | Status: SHIPPED | OUTPATIENT
Start: 2022-12-29 | End: 2023-03-29

## 2022-12-29 NOTE — PROGRESS NOTES
Elizabeth is a 47 year old who is being evaluated via a billable video visit.      How would you like to obtain your AVS? MyChart  If the video visit is dropped, the invitation should be resent by: Text to cell phone: [phone]  Will anyone else be joining your video visit? No      Assessment & Plan     Elizabeth was seen today for recheck medication.    Diagnoses and all orders for this visit:    Overweight (BMI 25.0-29.9)  Comments:  good result with 7 mg of semaglutide. no side effects. increase dose to 14 mg for 3 months.   Orders:  -     Semaglutide 14 MG TABS; Take 14 mg by mouth daily    Chronic constipation  Comments:  use Miralax for constipation. discussed use.   Orders:  -     polyethylene glycol (MIRALAX) 17 GM/Dose powder; Take 17 g (1 capful) by mouth daily for 90 days    Hyperlipidemia LDL goal <160  -     Lipid panel reflex to direct LDL Fasting; Future         Return in about 3 months (around 3/29/2023) for Physical Exam, Fasting Lab appointment.    Shaina Mei MD PhD  [facility]   Elizabeth is a 47 year old, presenting for the following health issues:  Recheck Medication      History of Present Illness       Reason for visit:  New medication    She eats 2-3 servings of fruits and vegetables daily.She consumes 1 sweetened beverage(s) daily.She exercises with enough effort to increase her heart rate 20 to 29 minutes per day.  She exercises with enough effort to increase her heart rate 3 or less days per week.   She is taking medications regularly.     checking weight loss medication ( semaglutide ). Side effect constipation.   Normally has constipation chronically. Tried metamucil, got bloated.   169.8 lbs this morning.     Review of Systems   Constitutional, HEENT, cardiovascular, pulmonary, gi and gu systems are negative, except as otherwise noted.      Objective    Vitals - Patient Reported  Weight (Patient Reported): 77 kg (169 lb 12.8 oz)  Pain Score: No Pain  (0)      Vitals:  No vitals were obtained today due to virtual visit.    Physical Exam   GENERAL: Healthy, alert and no distress  EYES: Eyes grossly normal to inspection.  No discharge or erythema, or obvious scleral/conjunctival abnormalities.  RESP: No audible wheeze, cough, or visible cyanosis.  No visible retractions or increased work of breathing.    SKIN: Visible skin clear. No significant rash, abnormal pigmentation or lesions.  NEURO: Cranial nerves grossly intact.  Mentation and speech appropriate for age.  PSYCH: Mentation appears normal, affect normal/bright, judgement and insight intact, normal speech and appearance well-groomed.      Video-Visit Details    Type of service:  Video Visit   Video start: 9:24 AM  Video end: 9:34 AM  Originating Location (pt. Location): Home  Distant Location (provider location):  Off-site  Platform used for Video Visit: [employer]

## 2023-04-30 ENCOUNTER — HEALTH MAINTENANCE LETTER (OUTPATIENT)
Age: 48
End: 2023-04-30

## 2023-05-22 ASSESSMENT — ENCOUNTER SYMPTOMS
SORE THROAT: 0
MYALGIAS: 1
HEMATOCHEZIA: 0
ARTHRALGIAS: 1
DYSURIA: 0
WEAKNESS: 0
PALPITATIONS: 0
FEVER: 0
EYE PAIN: 0
COUGH: 0
SHORTNESS OF BREATH: 0
CONSTIPATION: 0
NAUSEA: 0
ABDOMINAL PAIN: 0
HEMATURIA: 0
FREQUENCY: 0
JOINT SWELLING: 0
HEADACHES: 0
PARESTHESIAS: 1
DIARRHEA: 0
DIZZINESS: 0
CHILLS: 0
HEARTBURN: 1
NERVOUS/ANXIOUS: 0

## 2023-05-23 ENCOUNTER — OFFICE VISIT (OUTPATIENT)
Dept: FAMILY MEDICINE | Facility: CLINIC | Age: 48
End: 2023-05-23
Payer: COMMERCIAL

## 2023-05-23 VITALS
TEMPERATURE: 98.8 F | OXYGEN SATURATION: 100 % | DIASTOLIC BLOOD PRESSURE: 83 MMHG | BODY MASS INDEX: 25.37 KG/M2 | HEART RATE: 86 BPM | SYSTOLIC BLOOD PRESSURE: 116 MMHG | WEIGHT: 143.2 LBS | HEIGHT: 63 IN | RESPIRATION RATE: 21 BRPM

## 2023-05-23 DIAGNOSIS — E66.3 OVERWEIGHT (BMI 25.0-29.9): ICD-10-CM

## 2023-05-23 DIAGNOSIS — R10.2 ADNEXAL PAIN: ICD-10-CM

## 2023-05-23 DIAGNOSIS — S46.001A ROTATOR CUFF INJURY, RIGHT, INITIAL ENCOUNTER: ICD-10-CM

## 2023-05-23 DIAGNOSIS — L91.0 KELOID SCAR: ICD-10-CM

## 2023-05-23 DIAGNOSIS — K21.9 GASTROESOPHAGEAL REFLUX DISEASE WITHOUT ESOPHAGITIS: ICD-10-CM

## 2023-05-23 DIAGNOSIS — Z00.00 ROUTINE GENERAL MEDICAL EXAMINATION AT A HEALTH CARE FACILITY: Primary | ICD-10-CM

## 2023-05-23 DIAGNOSIS — N91.2 ABSENCE OF MENSTRUATION: ICD-10-CM

## 2023-05-23 DIAGNOSIS — E78.5 HYPERLIPIDEMIA LDL GOAL <160: ICD-10-CM

## 2023-05-23 DIAGNOSIS — K21.9 LARYNGOPHARYNGEAL REFLUX: ICD-10-CM

## 2023-05-23 LAB
CHOLEST SERPL-MCNC: 155 MG/DL
ESTRADIOL SERPL-MCNC: 99 PG/ML
FSH SERPL IRP2-ACNC: 9 MIU/ML
HDLC SERPL-MCNC: 53 MG/DL
LDLC SERPL CALC-MCNC: 95 MG/DL
NONHDLC SERPL-MCNC: 102 MG/DL
TRIGL SERPL-MCNC: 36 MG/DL

## 2023-05-23 PROCEDURE — 83001 ASSAY OF GONADOTROPIN (FSH): CPT | Performed by: INTERNAL MEDICINE

## 2023-05-23 PROCEDURE — 80061 LIPID PANEL: CPT | Performed by: INTERNAL MEDICINE

## 2023-05-23 PROCEDURE — 82670 ASSAY OF TOTAL ESTRADIOL: CPT | Performed by: INTERNAL MEDICINE

## 2023-05-23 PROCEDURE — 99396 PREV VISIT EST AGE 40-64: CPT | Performed by: INTERNAL MEDICINE

## 2023-05-23 PROCEDURE — 99214 OFFICE O/P EST MOD 30 MIN: CPT | Mod: 25 | Performed by: INTERNAL MEDICINE

## 2023-05-23 PROCEDURE — 36415 COLL VENOUS BLD VENIPUNCTURE: CPT | Performed by: INTERNAL MEDICINE

## 2023-05-23 ASSESSMENT — ENCOUNTER SYMPTOMS
PALPITATIONS: 0
MYALGIAS: 1
EYE PAIN: 0
WEAKNESS: 0
HEMATURIA: 0
PARESTHESIAS: 1
CONSTIPATION: 0
HEARTBURN: 1
FEVER: 0
NERVOUS/ANXIOUS: 0
NAUSEA: 0
ARTHRALGIAS: 1
SHORTNESS OF BREATH: 0
DIARRHEA: 0
FREQUENCY: 0
CHILLS: 0
JOINT SWELLING: 0
HEADACHES: 0
HEMATOCHEZIA: 0
COUGH: 0
SORE THROAT: 0
DIZZINESS: 0
ABDOMINAL PAIN: 0
DYSURIA: 0

## 2023-05-23 ASSESSMENT — PAIN SCALES - GENERAL: PAINLEVEL: MILD PAIN (2)

## 2023-05-23 NOTE — PATIENT INSTRUCTIONS
Additional instructions:  -- pelvic US: Call [phone] to schedule at [facility]      Preventive Health Recommendations  Female Ages 40 to 49    Yearly exam:   See your health care provider every year in order to  Review health changes.   Discuss preventive care.    Review your medicines if your doctor prescribed any.    Get a Pap test every three years (unless you have an abnormal result and your provider advises testing more often).    If you get Pap tests with HPV test, you only need to test every 5 years, unless you have an abnormal result. You do not need a Pap test if your uterus was removed (hysterectomy) and you have not had cancer.    You should be tested each year for STDs (sexually transmitted diseases), if you're at risk.   Ask your doctor if you should have a mammogram.    Have a colonoscopy (test for colon cancer) if someone in your family has had colon cancer or polyps before age 50.     Have a cholesterol test every 5 years.     Have a diabetes test (fasting glucose) after age 45. If you are at risk for diabetes, you should have this test every 3 years.    Shots: Get a flu shot each year. Get a tetanus shot every 10 years.     Nutrition:   Eat at least 5 servings of fruits and vegetables each day.  Eat whole-grain bread, whole-wheat pasta and brown rice instead of white grains and rice.  Get adequate Calcium and Vitamin D.      Lifestyle  Exercise at least 150 minutes a week (an average of 30 minutes a day, 5 days a week). This will help you control your weight and prevent disease.  Limit alcohol to one drink per day.  No smoking.   Wear sunscreen to prevent skin cancer.  See your dentist every six months for an exam and cleaning.

## 2023-05-23 NOTE — PROGRESS NOTES
SUBJECTIVE:   CC: Elizabeth is an 47 year old who presents for preventive health visit.        View : No data to display.            Patient has been advised of split billing requirements and indicates understanding: Yes  Healthy Habits:     Getting at least 3 servings of Calcium per day:  NO    Bi-annual eye exam:  Yes    Dental care twice a year:  NO    Sleep apnea or symptoms of sleep apnea:  None    Diet:  Regular (no restrictions)    Frequency of exercise:  None    Taking medications regularly:  Yes    Medication side effects:  None    PHQ-2 Total Score: 2    Additional concerns today:  Yes        Today's PHQ-2 Score:       2023    11:58 PM   PHQ-2 (  [employer])   Q1: Little interest or pleasure in doing things 1   Q2: Feeling down, depressed or hopeless 1   PHQ-2 Score 2   Q1: Little interest or pleasure in doing things Several days   Q2: Feeling down, depressed or hopeless Several days   PHQ-2 Score 2       Have you ever done Advance Care Planning? (For example, a Health Directive, POLST, or a discussion with a medical provider or your loved ones about your wishes): No, advance care planning information given to patient to review.  Patient plans to discuss their wishes with loved ones or provider.      Social History     Tobacco Use     Smoking status: Never     Smokeless tobacco: Never   Vaping Use     Vaping status: Never Used   Substance Use Topics     Alcohol use: Yes     Alcohol/week: 0.0 standard drinks of alcohol     Comment: rare             2023    11:57 PM   Alcohol Use   Prescreen: >3 drinks/day or >7 drinks/week? No     Reviewed orders with patient.  Reviewed health maintenance and updated orders accordingly - Yes      Breast Cancer Screenin/23/2023     1:35 PM   Breast CA Risk Assessment (FHS-7)   Do you have a family history of breast, colon, or ovarian cancer? No / Unknown           Pertinent mammograms are reviewed under the imaging tab.    History of abnormal Pap smear:  "NO - age 30-65 PAP every 5 years with negative HPV co-testing recommended      Latest Ref Rng & Units 2/8/2021     2:18 PM 2/8/2021     2:05 PM 2/28/2017    12:20 PM   PAP / HPV   PAP (Historical)  NIL       HPV 16 DNA NEG^Negative  Negative   Negative     HPV 18 DNA NEG^Negative  Negative   Negative     Other HR HPV NEG^Negative  Negative   Negative       Reviewed and updated as needed this visit by clinical staff   Tobacco  Allergies  Meds              Reviewed and updated as needed this visit by Provider                     Review of Systems   Constitutional: Negative for chills and fever.   HENT: Negative for congestion, ear pain, hearing loss and sore throat.    Eyes: Negative for pain and visual disturbance.   Respiratory: Negative for cough and shortness of breath.    Cardiovascular: Negative for chest pain, palpitations and peripheral edema.   Gastrointestinal: Positive for heartburn (ran out of omeprazole). Negative for abdominal pain, constipation, diarrhea, hematochezia and nausea.   Breasts:  Positive for tenderness (monthly, comes and goes). Negative for discharge.   Genitourinary: Positive for vaginal discharge (normal vaginal discharge, just slight increase in quantity). Negative for dysuria, frequency, genital sores, hematuria, pelvic pain, urgency and vaginal bleeding.   Musculoskeletal: Positive for arthralgias and myalgias (injured right shoulder at work 3 months ago). Negative for joint swelling.   Skin: Negative for rash.   Neurological: Positive for paresthesias (bilateral hip/anterior upper thigh, started 1 week ago. constant. ). Negative for dizziness, weakness and headaches.   Psychiatric/Behavioral: Negative for mood changes. The patient is not nervous/anxious.         OBJECTIVE:   /83 (BP Location: Right arm, Patient Position: Sitting, Cuff Size: Adult Regular)   Pulse 86   Temp 98.8  F (37.1  C) (Oral)   Resp 21   Ht 1.6 m (5' 2.99\")   Wt 65 kg (143 lb 3.2 oz)   LMP " 08/03/2021 (LMP Unknown)   SpO2 100%   BMI 25.37 kg/m    Physical Exam  GENERAL APPEARANCE: healthy, alert and no distress  EYES: Eyes grossly normal to inspection, PERRL and conjunctivae and sclerae normal  HENT: ear canals and TM's normal, nose and mouth without ulcers or lesions, oropharynx clear and oral mucous membranes moist  NECK: no adenopathy, no asymmetry, masses, or scars and thyroid normal to palpation  RESP: lungs clear to auscultation - no rales, rhonchi or wheezes  BREAST: normal without masses, tenderness or nipple discharge and no palpable axillary masses or adenopathy  CV: regular rate and rhythm, normal S1 S2, no S3 or S4, no murmur, click or rub, no peripheral edema and peripheral pulses strong  ABDOMEN: soft, nontender, no hepatosplenomegaly, no masses and bowel sounds normal  MS: no musculoskeletal defects are noted and gait is age appropriate without ataxia  SKIN: no suspicious lesions or rashes  Diagnostic Test Results:  none NEURO: Normal strength and tone, sensory exam grossly normal, mentation intact and speech normal  PSYCH: mentation appears normal and affect normal/bright        ASSESSMENT/PLAN:   Elizabeth was seen today for physical.    Diagnoses and all orders for this visit:    Routine general medical examination at a health care facility    Overweight (BMI 25.0-29.9)  Comments:  good result with 7 mg of semaglutide. no side effects. increase dose to 14 mg for 3 months.   Orders:  -     Semaglutide (RYBELSUS) 14 MG tablet; Take 14 mg by mouth daily    Gastroesophageal reflux disease without esophagitis  -     omeprazole (PRILOSEC) 20 MG DR capsule; Take 1 capsule (20 mg) by mouth daily    Laryngopharyngeal reflux  Comments:  her cough was probably from laryngeal reflux. Omeprazole. life style modification.   Orders:  -     omeprazole (PRILOSEC) 20 MG DR capsule; Take 1 capsule (20 mg) by mouth daily    Hyperlipidemia LDL goal <160  -     Lipid panel reflex to direct LDL Fasting;  "Future  -     Lipid panel reflex to direct LDL Fasting    Absence of menstruation  -     Follicle stimulating hormone; Future  -     Estradiol; Future  -     Follicle stimulating hormone  -     Estradiol    Rotator cuff injury, right, initial encounter  -     Physical Therapy Referral; Future    Keloid scar  -     Adult Plastic Surgery  Referral; Future    Adnexal pain  -     US Pelvic Complete with Transvaginal; Future    Other orders  -     PRIMARY CARE FOLLOW-UP SCHEDULING; Future              COUNSELING:  Reviewed preventive health counseling, as reflected in patient instructions      BMI:   Estimated body mass index is 25.37 kg/m  as calculated from the following:    Height as of this encounter: 1.6 m (5' 2.99\").    Weight as of this encounter: 65 kg (143 lb 3.2 oz).   Weight management plan: See healthy habits in AVS      She reports that she has never smoked. She has never used smokeless tobacco.      Shaina Mei MD PhD  [facility]  "

## 2023-05-24 ENCOUNTER — TELEPHONE (OUTPATIENT)
Dept: FAMILY MEDICINE | Facility: CLINIC | Age: 48
End: 2023-05-24
Payer: COMMERCIAL

## 2023-05-24 NOTE — TELEPHONE ENCOUNTER
Prior Authorization Request from [facility] for Semaglutide (RYBELSUS) 14 MG tablet        Note from Pharmacy/Key:[id number]

## 2023-05-30 NOTE — TELEPHONE ENCOUNTER
Central Prior Authorization Team   Phone: [phone]      PA Initiation    Medication: RYBELSUS 14 MG PO TABS  Insurance Company: [employer] - Phone [phone] Fax [phone]  Pharmacy Filling the Rx: [facility] [address] [place] [address] [id number]  Filling Pharmacy Phone: [phone]  Filling Pharmacy Fax:    Start Date: 5/30/2023

## 2023-06-01 NOTE — TELEPHONE ENCOUNTER
PRIOR AUTHORIZATION DENIED    Medication: RYBELSUS 14 MG PO TABS  Insurance Company: [employer] - Phone [phone] Fax [phone]  Denial Date: 5/30/2023  Denial Rational: COVERAGE REQUIRES DX OF TYPE 2 DIABETES AND MUST HAVE TRIAL OF METFORMIN, A SULFONYLUREA, PIOGLITAZONE, OR ANY COMBINATION PRODUCT      Appeal Information: IF PROVIDER WOULD LIKE TO APPEAL THIS DECISION PLEASE PROVIDE PA TEAM WITH LETTER OF MEDICAL NECESSITY      Patient Notified: No

## 2023-06-05 NOTE — TELEPHONE ENCOUNTER
Deborah from [facility] pharmacy calling to check on status of PA for Semaglutide 14 mg tablets. RN notes PA has been denied and encounter was routed to provider to review. RN relayed that information to Deborah. She verbalized good understanding and will make note of it.     SAUL Cui  [facility] Primary Care Triage

## 2023-06-06 NOTE — TELEPHONE ENCOUNTER
Left detailed msg informing pt of provider msg and recommendation below. Pt to call back with any questions or concerns.  Stacy Guzman, CMA

## 2023-06-06 NOTE — TELEPHONE ENCOUNTER
Please let pt know her insurance no longer cover this. Will only cover with diagnosis of diabetes. She doesn't have diabetes and her BMI is near normal now. We are not able to appeals this to her insurance. If she wants to keep on taking it, will need to be self pay. She can try Good Rx. Or stop taking it, diet control and exercise to prevent weight gain.

## 2023-06-16 ENCOUNTER — THERAPY VISIT (OUTPATIENT)
Dept: PHYSICAL THERAPY | Facility: CLINIC | Age: 48
End: 2023-06-16
Payer: COMMERCIAL

## 2023-06-16 DIAGNOSIS — M25.511 CHRONIC RIGHT SHOULDER PAIN: ICD-10-CM

## 2023-06-16 DIAGNOSIS — S46.001A ROTATOR CUFF INJURY, RIGHT, INITIAL ENCOUNTER: ICD-10-CM

## 2023-06-16 DIAGNOSIS — M54.2 NECK PAIN ON RIGHT SIDE: ICD-10-CM

## 2023-06-16 DIAGNOSIS — G89.29 CHRONIC RIGHT SHOULDER PAIN: ICD-10-CM

## 2023-06-16 PROCEDURE — 97110 THERAPEUTIC EXERCISES: CPT | Mod: GP

## 2023-06-16 PROCEDURE — 97162 PT EVAL MOD COMPLEX 30 MIN: CPT | Mod: GP

## 2023-06-16 NOTE — PROGRESS NOTES
PHYSICAL THERAPY EVALUATION  Type of Visit: Evaluation    See electronic medical record for Abuse and Falls Screening details.    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 She thought it would get better with time because it wasn't too bad at the beginning, but now it's affecting her neck and it shoots down her arm all the way down to her hand. She also can't sleep on her right side. She also can't raise her arm. No pain when she is sitting at rest.      Prior Level of Function   Transfers:   Ambulation:   ADL:   IADL:     Living Environment  Social support: With a significant other or spouse   Type of home: [facility]   Stairs to enter the home: No   Is there a railing: No   Ramp: No   Stairs inside the home: Yes 16     Help at home: Self Cares (home health aide/personal care attendant, family, etc)  Equipment owned:       Employment: Yes Nurse Assistant  Hobbies/Interests:      Patient goals for therapy: Everything    Pain assessment: Location: Right Shoulder down to her arm and up to her neck. Neck at rest./Rating: 10/10     Objective   SHOULDER EVALUATION  PAIN: Pain Level at Rest: 0/10  Pain Level with Use: 10/10  Pain Location: cervical spine, shoulder, elbow and wrist  Pain Quality: Sharp  Pain is Exacerbated By: Reaching her arm  Pain is Relieved By: rest  Pain Progression: Worsened  INTEGUMENTARY (edema, incisions):   POSTURE: Positive for Forward Head Posture and Positive for Kypohsis of the thoracic spine  GAIT:   Weightbearing Status:   Assistive Device(s):   Gait Deviations:   BALANCE/PROPRIOCEPTION:   WEIGHTBEARING ALIGNMENT:   ROM: Right  Shoulder - Flexion: 105, Abduction: 80, ER: 20, Extension: 45. Cervical ROM - Flexion: Full but painful in back of the neck.  Extension: Moderate Limitation and pain in back of the neck.  Left Side Bend: Mild Limitation and very little pain involved.  Right Side Bend: Mild limitation and painful in the left upper trap.  Right Rotation: Mild limitation and Left Upper Trap Pain.  Left Rotation: Mild limitation and Left Upper Trap Pain.    STRENGTH: Right Shoulder: Flexion 4+/5 and painful, Abduction 4+/5 and painful, Shoulder ER: 4+/5 and painful, Shoulder IR: 4+/5 and painful, Shoulder Extension: 5/5 and painfree, Elbow Flexion: 5/5 and painfree, Elbow Extension 5/5 and painfree  FLEXIBILITY: Generalized tighntess in bilaterally upper trap muscles restricting motions in all directions  SPECIAL TESTS: Serrano-Isreal: Negative, Neer's: Negative, Drop Arm Test: Negative, Labral Tests: Negative, Shoulder Instability Tests: Negative.  Neural Tension: Positive in Right Median N and Right Radial N  PALPATION: Tenderness in bilaterally Upper Traps  JOINT MOBILITY:   CERVICAL SCREEN: Cervical screen reproduces pain, symptoms and radicular symptoms with positive neural tension and restricted motions    Assessment & Plan   CLINICAL IMPRESSIONS   Medical Diagnosis: Right Rotator Cuff Injury    Treatment Diagnosis: Neck Pain with Right Shoulder and Right Arm Pain   Impression/Assessment: Patient is a 48 year old female with Right Arm and Neck Pain complaints.  The following significant findings have been identified: Pain, Decreased ROM/flexibility and Decreased strength. These impairments interfere with their ability to perform self care tasks, work tasks, recreational activities, household chores, driving , household mobility and community mobility as compared to previous level of function.     Clinical Decision Making (Complexity):   Clinical Presentation: Evolving/Changing  Clinical Presentation Rationale: based on medical  and personal factors listed in PT evaluation  Clinical Decision Making (Complexity): Moderate complexity    PLAN OF CARE  Treatment Interventions:  Modalities: Mechanical Traction, Ultrasound  Interventions: Gait Training, Manual Therapy, Neuromuscular Re-education, Therapeutic Activity, Therapeutic Exercise    Long Term Goals     PT Goal 1  Goal Identifier: Reaching  Goal Description: Patient will be able to reach arm overhead without increased right shoulder pain or right neck pain  Rationale: to maximize safety and independence with performance of ADLs and functional tasks;to maximize safety and independence within the community;to maximize safety and independence with transportation;to maximize safety and independence within the home;to maximize safety and independence with self cares  Goal Progress: Cannot due to pain  Target Date: 08/11/23      Frequency of Treatment: 1x a week  Duration of Treatment: 8 weeks    Recommended Referrals to Other Professionals: Physical Therapy  Education Assessment:        Risks and benefits of evaluation/treatment have been explained.   Patient/Family/caregiver agrees with Plan of Care.     Evaluation Time:     PT Eval, Moderate Complexity Minutes (59783): 29      Signing Clinician: Onesimo Kong PT

## 2023-06-20 ENCOUNTER — THERAPY VISIT (OUTPATIENT)
Dept: PHYSICAL THERAPY | Facility: CLINIC | Age: 48
End: 2023-06-20
Payer: COMMERCIAL

## 2023-06-20 DIAGNOSIS — M25.511 CHRONIC RIGHT SHOULDER PAIN: Primary | ICD-10-CM

## 2023-06-20 DIAGNOSIS — M54.2 NECK PAIN ON RIGHT SIDE: ICD-10-CM

## 2023-06-20 DIAGNOSIS — G89.29 CHRONIC RIGHT SHOULDER PAIN: Primary | ICD-10-CM

## 2023-06-20 PROCEDURE — 97110 THERAPEUTIC EXERCISES: CPT | Mod: GP | Performed by: PHYSICAL THERAPIST

## 2023-07-02 ENCOUNTER — OFFICE VISIT (OUTPATIENT)
Dept: URGENT CARE | Facility: URGENT CARE | Age: 48
End: 2023-07-02
Payer: COMMERCIAL

## 2023-07-02 VITALS
WEIGHT: 145 LBS | OXYGEN SATURATION: 97 % | HEART RATE: 107 BPM | DIASTOLIC BLOOD PRESSURE: 112 MMHG | SYSTOLIC BLOOD PRESSURE: 153 MMHG | TEMPERATURE: 97.6 F | BODY MASS INDEX: 25.69 KG/M2 | RESPIRATION RATE: 18 BRPM

## 2023-07-02 DIAGNOSIS — R07.0 THROAT PAIN: ICD-10-CM

## 2023-07-02 DIAGNOSIS — J06.9 VIRAL UPPER RESPIRATORY TRACT INFECTION WITH COUGH: Primary | ICD-10-CM

## 2023-07-02 LAB
DEPRECATED S PYO AG THROAT QL EIA: NEGATIVE
GROUP A STREP BY PCR: NOT DETECTED

## 2023-07-02 PROCEDURE — 99213 OFFICE O/P EST LOW 20 MIN: CPT | Performed by: PHYSICIAN ASSISTANT

## 2023-07-02 PROCEDURE — 87651 STREP A DNA AMP PROBE: CPT | Performed by: PHYSICIAN ASSISTANT

## 2023-07-02 RX ORDER — BENZONATATE 200 MG/1
200 CAPSULE ORAL 3 TIMES DAILY PRN
Qty: 30 CAPSULE | Refills: 0 | Status: SHIPPED | OUTPATIENT
Start: 2023-07-02 | End: 2024-08-16

## 2023-07-02 ASSESSMENT — ENCOUNTER SYMPTOMS
NECK PAIN: 0
ENDOCRINE NEGATIVE: 1
EYES NEGATIVE: 1
LIGHT-HEADEDNESS: 0
SHORTNESS OF BREATH: 0
MYALGIAS: 0
CHILLS: 0
BACK PAIN: 0
SORE THROAT: 1
CARDIOVASCULAR NEGATIVE: 1
ALLERGIC/IMMUNOLOGIC NEGATIVE: 1
PALPITATIONS: 0
NECK STIFFNESS: 0
FEVER: 0
RHINORRHEA: 0
WOUND: 0
ARTHRALGIAS: 0
COUGH: 1
MUSCULOSKELETAL NEGATIVE: 1
DIARRHEA: 0
WEAKNESS: 0
JOINT SWELLING: 0
VOMITING: 0
NAUSEA: 0
HEADACHES: 1
DIZZINESS: 0

## 2023-07-02 NOTE — PROGRESS NOTES
Chief Complaint:     Chief Complaint   Patient presents with     Cough     Tightness in chest. Negative rapid covid test this morning. Onset- Wednesday      Pharyngitis     Headache       Results for orders placed or performed in visit on 07/02/23   Streptococcus A Rapid Screen w/Reflex to PCR - Clinic Collect     Status: Normal    Specimen: Throat; Swab   Result Value Ref Range    Group A Strep antigen Negative Negative       Medical Decision Making:    Vital signs reviewed by Cornelio Padilla PA-C  BP (!) 153/112 (BP Location: Left arm, Patient Position: Sitting, Cuff Size: Adult Regular)   Pulse 107   Temp 97.6  F (36.4  C) (Tympanic)   Resp 18   Wt 65.8 kg (145 lb)   LMP 08/03/2021 (LMP Unknown)   SpO2 97%   BMI 25.69 kg/m      Differential Diagnosis:  URI Adult/Peds:  Bronchitis-viral, Influenza, Pneumonia, Strep pharyngitis, Tonsilitis, Viral pharyngitis, Viral syndrome and Viral upper respiratory illness        ASSESSMENT    1. Viral upper respiratory tract infection with cough    2. Throat pain        PLAN    Patient is in no acute distress.    Temp is 97.6 in clinic today, lung sounds were clear, and O2 sats at 97% on RA.    RST was negative.  We will call with PCR results only if positive.  Rest, Push fluids, vaporizer, elevation of head of bed.  Ibuprofen and or Tylenol for any fever or body aches.  Rx for Tessalon cough suppressant- PRN- as discussed.   If symptoms worsen, recheck immediately otherwise follow up with your PCP in 1 week if symptoms are not improving.  Worrisome symptoms discussed with instructions to go to the ED.  Patient verbalized understanding and agreed with this plan.    Labs:    Results for orders placed or performed in visit on 07/02/23   Streptococcus A Rapid Screen w/Reflex to PCR - Clinic Collect     Status: Normal    Specimen: Throat; Swab   Result Value Ref Range    Group A Strep antigen Negative Negative        Vital signs reviewed by Cornelio Padilla PA-C  BP (!)  153/112 (BP Location: Left arm, Patient Position: Sitting, Cuff Size: Adult Regular)   Pulse 107   Temp 97.6  F (36.4  C) (Tympanic)   Resp 18   Wt 65.8 kg (145 lb)   LMP 08/03/2021 (LMP Unknown)   SpO2 97%   BMI 25.69 kg/m      Current Meds      Current Outpatient Medications:      omeprazole (PRILOSEC) 20 MG DR capsule, Take 1 capsule (20 mg) by mouth daily, Disp: 90 capsule, Rfl: 3     Semaglutide (RYBELSUS) 14 MG tablet, Take 14 mg by mouth daily (Patient not taking: Reported on 7/2/2023), Disp: 90 tablet, Rfl: 0      Respiratory History    occasional episodes of bronchitis      SUBJECTIVE    HPI: Elizabeth Rivero is an 48 year old female who presents with chest congestion, cough nonproductive, occasional, headache and sore throat.  Symptoms began 4  days ago and has unchanged.  There is no shortness of breath, wheezing and chest pain.  Patient is eating and drinking well.  No fever, nausea, vomiting, or diarrhea.    Patient denies any recent travel or exposure to known COVID positive tested individual.      ROS:     Review of Systems   Constitutional: Negative for chills and fever.   HENT: Positive for congestion and sore throat. Negative for ear pain and rhinorrhea.    Eyes: Negative.    Respiratory: Positive for cough. Negative for shortness of breath.    Cardiovascular: Negative.  Negative for chest pain and palpitations.   Gastrointestinal: Negative for diarrhea, nausea and vomiting.   Endocrine: Negative.    Genitourinary: Negative.    Musculoskeletal: Negative.  Negative for arthralgias, back pain, joint swelling, myalgias, neck pain and neck stiffness.   Skin: Negative.  Negative for rash and wound.   Allergic/Immunologic: Negative.  Negative for immunocompromised state.   Neurological: Positive for headaches. Negative for dizziness, weakness and light-headedness.         Family History   Family History   Problem Relation Age of Onset     Diabetes Maternal Grandmother      Hypertension Mother       Hyperlipidemia Mother      Hypertension Father      Hyperlipidemia Brother      No Known Problems Sister      No Known Problems Maternal Grandfather      No Known Problems Paternal Grandmother      No Known Problems Other         Problem history  Patient Active Problem List   Diagnosis     Intramural leiomyoma of uterus     Gastroesophageal reflux disease without esophagitis     Iron deficiency anemia     Abnormal uterine bleeding due to intramural leiomyoma     S/P hysterectomy     Chronic right shoulder pain     Neck pain on right side        Allergies  Allergies   Allergen Reactions     Metformin Nausea     Topamax [Topiramate]      tingling        Social History  Social History     Socioeconomic History     Marital status:      Spouse name: Not on file     Number of children: Not on file     Years of education: Not on file     Highest education level: Not on file   Occupational History     Not on file   Tobacco Use     Smoking status: Never     Smokeless tobacco: Never   Vaping Use     Vaping Use: Never used   Substance and Sexual Activity     Alcohol use: Yes     Alcohol/week: 0.0 standard drinks of alcohol     Comment: rare     Drug use: No     Sexual activity: Yes     Partners: Male     Birth control/protection: Female Surgical     Comment: Hysterectomy   Other Topics Concern     Parent/sibling w/ CABG, MI or angioplasty before 65F 55M? Not Asked   Social History Narrative     Not on file     Social Determinants of Health     Financial Resource Strain: Not on file   Food Insecurity: Not on file   Transportation Needs: Not on file   Physical Activity: Not on file   Stress: Not on file   Social Connections: Not on file   Intimate Partner Violence: Not on file   Housing Stability: Not on file        OBJECTIVE     Vital signs reviewed by Cornelio Padilla PA-C  BP (!) 153/112 (BP Location: Left arm, Patient Position: Sitting, Cuff Size: Adult Regular)   Pulse 107   Temp 97.6  F (36.4  C) (Tympanic)    Resp 18   Wt 65.8 kg (145 lb)   LMP 08/03/2021 (LMP Unknown)   SpO2 97%   BMI 25.69 kg/m       Physical Exam  Vitals and nursing note reviewed.   Constitutional:       General: She is not in acute distress.     Appearance: She is well-developed. She is not ill-appearing, toxic-appearing or diaphoretic.   HENT:      Head: Normocephalic and atraumatic.      Right Ear: Hearing, tympanic membrane, ear canal and external ear normal. Tympanic membrane is not perforated, erythematous, retracted or bulging.      Left Ear: Hearing, tympanic membrane, ear canal and external ear normal. Tympanic membrane is not perforated, erythematous, retracted or bulging.      Nose: Congestion present. No mucosal edema or rhinorrhea.      Right Sinus: No maxillary sinus tenderness or frontal sinus tenderness.      Left Sinus: No maxillary sinus tenderness or frontal sinus tenderness.      Mouth/Throat:      Pharynx: Posterior oropharyngeal erythema present. No pharyngeal swelling, oropharyngeal exudate or uvula swelling.      Tonsils: No tonsillar exudate or tonsillar abscesses. 0 on the right. 0 on the left.   Eyes:      General:         Right eye: No discharge.         Left eye: No discharge.      Pupils: Pupils are equal, round, and reactive to light.   Cardiovascular:      Rate and Rhythm: Normal rate and regular rhythm.      Heart sounds: Normal heart sounds. No murmur heard.     No friction rub. No gallop.   Pulmonary:      Effort: Pulmonary effort is normal. No respiratory distress.      Breath sounds: Normal breath sounds. No decreased breath sounds, wheezing, rhonchi or rales.   Chest:      Chest wall: No tenderness.   Abdominal:      General: Bowel sounds are normal. There is no distension.      Palpations: Abdomen is soft. There is no mass.      Tenderness: There is no abdominal tenderness. There is no guarding.   Musculoskeletal:      Cervical back: Normal range of motion and neck supple.   Lymphadenopathy:      Head:       Right side of head: No submental, submandibular, tonsillar, preauricular or posterior auricular adenopathy.      Left side of head: No submental, submandibular, tonsillar, preauricular or posterior auricular adenopathy.      Cervical: No cervical adenopathy.      Right cervical: No superficial or posterior cervical adenopathy.     Left cervical: No superficial or posterior cervical adenopathy.   Skin:     General: Skin is warm and dry.      Findings: No rash.   Neurological:      Mental Status: She is alert and oriented to person, place, and time.      Cranial Nerves: No cranial nerve deficit.      Deep Tendon Reflexes: Reflexes are normal and symmetric.   Psychiatric:         Behavior: Behavior normal. Behavior is cooperative.         Thought Content: Thought content normal.         Judgment: Judgment normal.           Cornelio Padilla PA-C  7/2/2023, 9:31 AM

## 2023-07-05 ENCOUNTER — NURSE TRIAGE (OUTPATIENT)
Dept: FAMILY MEDICINE | Facility: CLINIC | Age: 48
End: 2023-07-05
Payer: COMMERCIAL

## 2023-07-05 NOTE — TELEPHONE ENCOUNTER
Patient called regarding ongoing symptoms. Reports symptoms started last Wednesday. Reports having symptoms such as a cough that is slightly productive with dark yellow sputum, sore throat, runny nose, and headaches. Reports going to urgent care on Sunday and was only tested for strep, which came back negative. Reports being short of breath with coughing and talking, denies any SOB at rest. Reports chest pain with coughing. No chest pain at rest. Reports slight wheezing. Denies any fever. Denies coughing up any blood.     Patient stated that she took a COVID test at home on Sunday, which was negative. Reports that cough and symptoms have worsened since Sunday. Reports that headaches come and go on one side of head that is associated with a nodule behind patient's ear. Denies any drainage from ear. Reported having some nausea with this. Pain has improved with this and nausea has subsided. Reports also having some mucous on her right eye, which happened after being seen at urgent care.     Patient denies any shortness of breath or chest pain at rest. Patient does not own a pulse oximeter at home.     Per protocol, advised patient to be seen today. No in clinic appointments available at [facility] or [place] today. Advised patient and  to be seen at urgent care again today. Writer does not advise waiting to be seen until tomorrow since her symptoms are worsening. They verbalized understanding & agreed to be seen at urgent care today. They had no further questions/concerns at this time.     Saima Barrios, COREYN, RN   [facility]      Reason for Disposition    Wheezing is present    MILD difficulty breathing (e.g., minimal/no SOB at rest, SOB with walking, pulse <100) and still present when not coughing    Additional Information    Negative: Sounds like a life-threatening emergency to the triager    Negative: Difficulty breathing after exposure to flames, smoke, or  "fumes    Negative: Coughing started suddenly after medicine, an allergic food or bee sting    Negative: Rapid onset of cough and has hives    Negative: SEVERE difficulty breathing (e.g., struggling for each breath, speaks in single words)    Negative: Bluish (or gray) lips or face    Negative: MODERATE difficulty breathing (e.g., speaks in phrases, SOB even at rest, pulse 100-120) and still present when not coughing    Negative: Dry cough (non-productive; no sputum or minimal clear sputum) and within 14 days of COVID-19 Exposure    Negative: Previous asthma attacks and this feels like asthma attack    Negative: Chest pain present when not coughing    Negative: Passed out (i.e., fainted, collapsed and was not responding)    Negative: Patient sounds very sick or weak to the triager    Negative: Fever > 103 F (39.4 C)    Negative: Coughed up > 1 tablespoon (15 ml) blood (Exception: Blood-tinged sputum.)    Negative: Fever > 101 F (38.3 C) and over 60 years of age    Negative: Fever > 100.0 F (37.8 C) and has diabetes mellitus or a weak immune system (e.g., HIV positive, cancer chemotherapy, organ transplant, splenectomy, chronic steroids)    Negative: Fever > 100.0 F (37.8 C) and bedridden (e.g., nursing home patient, stroke, chronic illness, recovering from surgery)    Answer Assessment - Initial Assessment Questions  1. ONSET: \"When did the cough begin?\"       Last Wednesday   2. SEVERITY: \"How bad is the cough today?\"       Cough has gotten worse.   3. SPUTUM: \"Describe the color of your sputum\" (none, dry cough; clear, white, yellow, green)      Is coughing up a little bit of dark yellow sputum.   4. HEMOPTYSIS: \"Are you coughing up any blood?\" If so ask: \"How much?\" (flecks, streaks, tablespoons, etc.)      Not coughing up blood.   5. DIFFICULTY BREATHING: \"Are you having difficulty breathing?\" If Yes, ask: \"How bad is it?\" (e.g., mild, moderate, severe)     - MILD: No SOB at rest, mild SOB with walking, speaks " "normally in sentences, can lie down, no retractions, pulse < 100.     - MODERATE: SOB at rest, SOB with minimal exertion and prefers to sit, cannot lie down flat, speaks in phrases, mild retractions, audible wheezing, pulse 100-120.     - SEVERE: Very SOB at rest, speaks in single words, struggling to breathe, sitting hunched forward, retractions, pulse > 120       Mild to moderate   6. FEVER: \"Do you have a fever?\" If Yes, ask: \"What is your temperature, how was it measured, and when did it start?\"      No fever  7. CARDIAC HISTORY: \"Do you have any history of heart disease?\" (e.g., heart attack, congestive heart failure)       N/A  8. LUNG HISTORY: \"Do you have any history of lung disease?\"  (e.g., pulmonary embolus, asthma, emphysema)      N/A  9. PE RISK FACTORS: \"Do you have a history of blood clots?\" (or: recent major surgery, recent prolonged travel, bedridden)      N/A  10. OTHER SYMPTOMS: \"Do you have any other symptoms?\" (e.g., runny nose, wheezing, chest pain)        Headaches on and off, runny nose, sore throat, little bit of wheezing, chest pain when coughing, ear pain. \"So painful to touch\", has subsided a little bit. Not completing gone. Some nausea with this. No drainage from ear. No chest pain when sitting.   11. PREGNANCY: \"Is there any chance you are pregnant?\" \"When was your last menstrual period?\"        N/A  12. TRAVEL: \"Have you traveled out of the country in the last month?\" (e.g., travel history, exposures)        N/A    Protocols used: COUGH-A-[place]      "

## 2023-07-22 ENCOUNTER — THERAPY VISIT (OUTPATIENT)
Dept: PHYSICAL THERAPY | Facility: CLINIC | Age: 48
End: 2023-07-22
Payer: COMMERCIAL

## 2023-07-22 DIAGNOSIS — M25.511 CHRONIC RIGHT SHOULDER PAIN: Primary | ICD-10-CM

## 2023-07-22 DIAGNOSIS — G89.29 CHRONIC RIGHT SHOULDER PAIN: Primary | ICD-10-CM

## 2023-07-22 DIAGNOSIS — M54.2 NECK PAIN ON RIGHT SIDE: ICD-10-CM

## 2023-07-22 PROCEDURE — 97110 THERAPEUTIC EXERCISES: CPT | Mod: GP

## 2023-07-22 PROCEDURE — 97140 MANUAL THERAPY 1/> REGIONS: CPT | Mod: GP

## 2023-08-15 ENCOUNTER — THERAPY VISIT (OUTPATIENT)
Dept: PHYSICAL THERAPY | Facility: CLINIC | Age: 48
End: 2023-08-15
Payer: COMMERCIAL

## 2023-08-15 DIAGNOSIS — M25.511 CHRONIC RIGHT SHOULDER PAIN: Primary | ICD-10-CM

## 2023-08-15 DIAGNOSIS — G89.29 CHRONIC RIGHT SHOULDER PAIN: Primary | ICD-10-CM

## 2023-08-15 DIAGNOSIS — M54.2 NECK PAIN ON RIGHT SIDE: ICD-10-CM

## 2023-08-15 PROCEDURE — 97140 MANUAL THERAPY 1/> REGIONS: CPT | Mod: GP

## 2023-08-15 PROCEDURE — 97110 THERAPEUTIC EXERCISES: CPT | Mod: GP

## 2023-08-25 ENCOUNTER — THERAPY VISIT (OUTPATIENT)
Dept: PHYSICAL THERAPY | Facility: CLINIC | Age: 48
End: 2023-08-25
Payer: COMMERCIAL

## 2023-08-25 DIAGNOSIS — M54.2 NECK PAIN ON RIGHT SIDE: ICD-10-CM

## 2023-08-25 DIAGNOSIS — G89.29 CHRONIC RIGHT SHOULDER PAIN: Primary | ICD-10-CM

## 2023-08-25 DIAGNOSIS — M25.511 CHRONIC RIGHT SHOULDER PAIN: Primary | ICD-10-CM

## 2023-08-25 PROCEDURE — 97140 MANUAL THERAPY 1/> REGIONS: CPT | Mod: GP

## 2023-08-25 PROCEDURE — 97110 THERAPEUTIC EXERCISES: CPT | Mod: GP

## 2023-09-07 ENCOUNTER — TELEPHONE (OUTPATIENT)
Dept: SURGERY | Facility: CLINIC | Age: 48
End: 2023-09-07
Payer: COMMERCIAL

## 2023-09-07 NOTE — TELEPHONE ENCOUNTER
PREVISIT INFORMATION                                                    Elizabeth Rivero scheduled for future visit at [facility] specialty clinics.    Patient is scheduled to see Dr. Jamison on 9/823  Reason for visit: Consult for keloid scar  Referring provider     Shaina Mei MD PhD     Has patient seen previous specialist? No  Medical Records:  Available in chart.  Patient was previously seen at a [facility] or [facility] facility.    REVIEW                                                      New patient packet mailed to patient: No  Medication reconciliation complete: No      Current Outpatient Medications   Medication Sig Dispense Refill    benzonatate (TESSALON) 200 MG capsule Take 1 capsule (200 mg) by mouth 3 times daily as needed for cough 30 capsule 0    omeprazole (PRILOSEC) 20 MG DR capsule Take 1 capsule (20 mg) by mouth daily 90 capsule 3    Semaglutide (RYBELSUS) 14 MG tablet Take 14 mg by mouth daily (Patient not taking: Reported on 7/2/2023) 90 tablet 0       Allergies: Metformin and Topamax [topiramate]        PLAN/FOLLOW-UP NEEDED                                                      Previsit review complete.  Patient will see provider at future scheduled appointment.     Writer called and spoke with patient for previsit, appointment date, time and location reminder. Confirmed appointment is a consultation.    Patient Reminders Given:  Please, make sure you bring an updated list of your medications.   If you are having a procedure, please, present 15 minutes early.  If you need to cancel or reschedule,please call [phone].    Lucy Lew LPN

## 2023-09-08 ENCOUNTER — TELEPHONE (OUTPATIENT)
Dept: SURGERY | Facility: CLINIC | Age: 48
End: 2023-09-08

## 2023-09-08 ENCOUNTER — OFFICE VISIT (OUTPATIENT)
Dept: SURGERY | Facility: CLINIC | Age: 48
End: 2023-09-08
Attending: INTERNAL MEDICINE
Payer: COMMERCIAL

## 2023-09-08 VITALS — WEIGHT: 155.1 LBS | HEIGHT: 63 IN | BODY MASS INDEX: 27.48 KG/M2

## 2023-09-08 DIAGNOSIS — L91.0 KELOID SCAR: ICD-10-CM

## 2023-09-08 PROCEDURE — 99204 OFFICE O/P NEW MOD 45 MIN: CPT | Performed by: STUDENT IN AN ORGANIZED HEALTH CARE EDUCATION/TRAINING PROGRAM

## 2023-09-08 NOTE — PROGRESS NOTES
"PRS    HPI: 48-year-old -American female presenting with abdominal keloid.  She had a hysterectomy 2 years ago and then the healing process resulted in what she believes to be a keloid.  She would like it removed if possible.  Of note, she has had prior keloids from prior surgeries including  and fibroid removal.  She has never been treated with steroids.  Never been treated with 5-FU.  She thinks that may be in [facility] she had undergone some sort of radiation treatment but she is unclear.  Of note, the keloid scar of the periumbilical area is very itchy and sometimes hurts.  Also, patient is interested in an abdominoplasty    ROS: Negative, see HPI  Past medical history: Leiomyoma of the uterus  Past surgical history: Hysterectomy 2 years ago, , fibroid removal  Medications: No blood thinners  Allergies: None  Family history: No bleeding or clotting problems, issues with anesthesia  Social history: Non-smoker, denies any tobacco or nicotine use    Examination:  Ht 1.6 m (5' 3\")   Wt 70.4 kg (155 lb 1.6 oz)   LMP 2021 (LMP Unknown)   BMI 27.47 kg/m    Nonlabored breathing  Not distressed  Periumbilical keloid formation  No palpable umbilical or other hernia  Lower abdominal skin excess with lipodystrophy including the flanks    A/P: 48-year-old female -American presenting with periumbilical abdominal keloid    -Discussed options for management, including direct excision with intraoperative intralesional steroid injection followed by serial injections of either intralesional triamcinolone with or without 5-FU.  We also did discuss the possibility of sending her to a radiation oncologist for discussion about the risks and benefits of localized radiation treatment.  My preference in general is to reserve localized radiation therapy for keloids only if intralesional injections fail to work.  The rationale for this is that even though it is low-dose localized radiation, it is " still radiation and carries with it risks.  -Discussed the risks of surgery, including but not limited to: Infection, bleeding, pain, poor scarring, recurrence of keloid, wound healing issues, hypopigmentation, fat atrophy, contour irregularity, suboptimal aesthetic result.  Despite these risks, patient consents to and would like to proceed with periumbilical abdominal keloid excision with intralesional steroid injection.  We will also plan to schedule intralesional steroid and 5-FU injections starting at 3-4 weeks postoperatively for a total of 4-6 injections.  -Discussed the possibility of performing a tummy tuck in the future.  My preference would be to excise the keloid scar, treat the keloid scarring and ensure that she does not redevelop a keloid prior to committing to a larger elective aesthetic surgery.  And agrees with the plan.  -Case request placed  -Photography today  -A total of 45 minutes was devoted to review of chart, direct face-to-face patient counseling and documentation during this encounter, exclusive of any procedure performed.    Paul Jamison MD, PhD

## 2023-09-08 NOTE — TELEPHONE ENCOUNTER
Patient to be scheduled for excision of keloid scar on abdomen.     Patient to have 5FU injections 4 weeks post op for 4-6 weeks. Schedule with infusion.    Lucy Lew LPN

## 2023-09-08 NOTE — NURSING NOTE
"Elizabeth Rivero's goals for this visit include:   Chief Complaint   Patient presents with    Consult     Scar on abdomen from hysterectomy surgery 2 years ago       She requests these members of her care team be copied on today's visit information: no    PCP: Shaina Mei    Referring Provider:  Shaina Mei MD PhD  [address] [place] [address]    Ht 1.6 m (5' 3\")   Wt 70.4 kg (155 lb 1.6 oz)   LMP 08/03/2021 (LMP Unknown)   BMI 27.47 kg/m      Do you need any medication refills at today's visit? No    Lucy Lew LPN  =    "

## 2023-09-15 ENCOUNTER — TELEPHONE (OUTPATIENT)
Dept: PLASTIC SURGERY | Facility: CLINIC | Age: 48
End: 2023-09-15
Payer: COMMERCIAL

## 2023-09-15 NOTE — TELEPHONE ENCOUNTER
Left voicemail for patient regarding scheduling surgery with Dr. Jamison.    Provided direct contact number to discuss.    P: [phone]    __    Nesha Boyd, Senior Perioperative Coordinator, on 9/15/2023 at 4:55 PM

## 2023-09-27 ENCOUNTER — PATIENT OUTREACH (OUTPATIENT)
Dept: CARE COORDINATION | Facility: CLINIC | Age: 48
End: 2023-09-27
Payer: COMMERCIAL

## 2023-09-29 PROBLEM — L91.0 KELOID SCAR: Status: ACTIVE | Noted: 2023-09-29

## 2023-10-13 NOTE — PROGRESS NOTES
08/25/23 0500   Appointment Info   Signing clinician's name / credentials Onesimo Kong, PT, DPT, CSCS, CLT   Total/Authorized Visits [employer]   Visits Used 5   Medical Diagnosis Right Rotator Cuff Injury   PT Tx Diagnosis Neck Pain with Right Shoulder and Right Arm Pain   Progress Note/Certification   Onset of illness/injury or Date of Surgery 05/23/23   Therapy Frequency 1x a week   Predicted Duration 8 weeks   Progress Note Due Date 08/11/23   Progress Note Completed Date 06/16/23   PT Goal 1   Goal Identifier Reaching   Goal Description Patient will be able to reach arm overhead without increased right shoulder pain or right neck pain   Rationale to maximize safety and independence with performance of ADLs and functional tasks;to maximize safety and independence within the community;to maximize safety and independence with transportation;to maximize safety and independence within the home;to maximize safety and independence with self cares   Target Date 08/11/23   Subjective Report   Subjective Report She reports she was working and her right hand hit a chair resulting in a jerking motion that caused  terrible pain into the elbow.  Now, whenever she sits she feels pain shooting into her elbow.  That happened two days ago. However prior to Tuesday the exercises seemed to be helping.  She still can't wash her back.   Objective Measures   Objective Measures Objective Measure 3   Objective Measure 1   Objective Measure Special Tests   Details Positive median nerve tension   Objective Measure 2   Objective Measure Pain level   Details Right lateral shoulder / arm: 1/10 at rest and 8/10 wih use.  After manual and chin tucks in supine: Pain reduced to 5/10 with use   Objective Measure 3   Objective Measure ROM   Details Flexion: 145 (Stiff and distal deltoid pain), Abduction: 135 (distal deltoid pain), ER: 40 (Posterior and Anterior shoulder pain).  Improved to Flexion 165 and abduction 165 after neck exercises    Treatment Interventions (PT)   Interventions Therapeutic Procedure/Exercise;Manual Therapy   Therapeutic Procedure/Exercise   Therapeutic Procedures: strength, endurance, ROM, flexibillity minutes (28769) 38   PTRx Ther Proc 1 Cervical Retraction   PTRx Ther Proc 1 - Details 1x10 in supine while manual was being performed.  5x10 in sitting position (eliminated arm symptoms but made neck tired. Also caused right head discomfort). Easily works the best.   PTRx Ther Proc 2 Upper Trapezius Stretch   PTRx Ther Proc 2 - Details 2x30 seconds each side   PTRx Ther Proc 3 Cervical ROM Rotation   PTRx Ther Proc 3 - Details 2x30 seconds each side    PTRx Ther Proc 4 Cervical Extension Supported   PTRx Ther Proc 4 - Details 1x10 with belt or towel   Skilled Intervention Focus on cervical Spine   Patient Response/Progress Significantly impoved right shoulder   Ther Proc 1 Tried seated thoracic extension   Ther Proc 1 - Details 1x10 (No effect)   Manual Therapy   Manual Therapy: Mobilization, MFR, MLD, friction massage minutes (74329) 15   Manual Therapy 1 STM and suboccipital release to the neck   Manual Therapy 1 - Details In supine, STM and suboccipital release to the neck with gentle grade 1 traction   Skilled Intervention Tolerated well   Patient Response/Progress Relief of the neck but no impact on the shoulder/right arm   Plan   Home program See PTRx   Plan for next session Only did chin tucks 1x a day and also hit her arm against a chair that reflared her symptoms up. But everything was eliminated with chin tucks and some stretching in PT so we know chin tucks are what are needed.   Total Session Time   Timed Code Treatment Minutes 53   Total Treatment Time (sum of timed and untimed services) 53         DISCHARGE  Reason for Discharge: Patient chooses to discontinue therapy.  Patient had a tremendous response with neck focused treatments.     Equipment Issued: None    Discharge Plan: Patient to continue home  program.    Referring Provider:  Shaina Mei

## 2023-10-26 NOTE — TELEPHONE ENCOUNTER
Pt called, No answer.  does not identify pt. Left general message for pt to call the [facility] back at [phone].     Lucy Lew LPN

## 2023-11-30 NOTE — TELEPHONE ENCOUNTER
Pt called, No answer.  does not identify pt. Left general message for pt to call the [facility] back at [phone].     Lucy Lew LPN

## 2023-11-30 NOTE — TELEPHONE ENCOUNTER
Received voicemail from patient on 11/30 stating she missed a call. States she has been waiting for insurance approval.    Writer explained that it is unclear who may have called today, but writer called to schedule surgery on 9/28 and had not heard back yet. Explained PA process.  __    RN Care Coordinator: Ju Arboleda    Surgery is scheduled with Dr. Jamison  Date: 1/10   Location: [facility]      H&P to be completed by: Primary Care team - patient instructed to schedule with Shaina Mei MD     Post-op visit(s): 1/19 with Dr. Jamison, [facility]      Patient will receive a phone call from pre-admission nurses 1-2 days prior to surgery with arrival time and NPO instructions.       Spoke with the patient and was able to confirm all scheduled information.       Patient questions/concerns: N/A       Surgery packet: to be sent via US mail    __    Nesha Boyd, Senior Perioperative Coordinator, on 11/30/2023 at 4:04 PM  P: [phone]

## 2023-12-19 NOTE — TELEPHONE ENCOUNTER
Received voicemail from patient requesting to cancel surgery on 1/10.    Spoke with patient, she is concerned about out of pocket costs due to her deductible. Inquired if she wanted to postpone surgery, and she requested surgery in July.    Spoke with the patient and was able to confirm all scheduled information.     Surgery is rescheduled with Dr. Jamison   Date: 7/24   Location: [facility]      H&P to be completed by: Primary Care team - patient instructed to schedule.     Post-op: rescheduled to 8/2 with Dr. Jamison, [facility]      Patient will receive a phone call from pre-admission nurses 3-5 days prior to surgery with arrival time and NPO instructions.     Patient questions/concerns: N/A     __    Nesha Boyd, Senior Perioperative Coordinator, on 12/19/2023 at 12:21 PM  P: [phone]

## 2024-02-24 ENCOUNTER — HEALTH MAINTENANCE LETTER (OUTPATIENT)
Age: 49
End: 2024-02-24

## 2024-03-25 NOTE — TELEPHONE ENCOUNTER
Dr David Warren  From [facility]  Clinic # [phone]  Direct office # [phone]     Calling from [facility] regarding US that was done today. Found that the pt has a uterine fibroid. He would like the pt to have a hysteroscopy.  He is wondering if Dr BREEN would be will to do the procedure. The pt has a copy of the images and a letter is being sent to the clinic regarding the findings.   Dr Hernandez is aware that dr BREEN is not in clinic today but scheduled to return on Monday. Will msg her with this info and await her response. Either the Triage team/ or Dr BREEN will get back to him regarding the procedure.   No

## 2024-07-08 ENCOUNTER — TELEPHONE (OUTPATIENT)
Dept: SURGERY | Facility: CLINIC | Age: 49
End: 2024-07-08
Payer: COMMERCIAL

## 2024-07-08 ENCOUNTER — TELEPHONE (OUTPATIENT)
Dept: FAMILY MEDICINE | Facility: CLINIC | Age: 49
End: 2024-07-08
Payer: COMMERCIAL

## 2024-07-08 NOTE — TELEPHONE ENCOUNTER
Received voicemail from patient requesting to cancel surgery scheduled on 7/24, and meet with Dr. Jamison in clinic    Spoke with patient regarding her request, and to confirm she does not want to reschedule surgery until she meets with Dr. Jamison    Patient confirmed that she would like to discuss adding liposuction and/or a tummy tuck to planned surgery: periumbilical abdominal keloid excision with intralesional steroid injection, she would like to only undergo anesthesia once if possible    Patient requested to convert post-op appointment on 8/2 with Dr. Jamison to a consult visit     Updated appointment, removed surgery from schedule    Will wait for update from care team regarding scheduling after patient is seen   __    Nesha Boyd on 7/8/2024 at 2:41 PM  [phone]

## 2024-07-08 NOTE — TELEPHONE ENCOUNTER
Patient calling to cancel upcoming procedure:      Writer notes it is at [facility]. Provided patient the phone number to go ahead and cancel. She verbalized understanding and had no other questions or concerns at this time.    Sid Espinoza RN  [facility]

## 2024-07-13 ENCOUNTER — HEALTH MAINTENANCE LETTER (OUTPATIENT)
Age: 49
End: 2024-07-13

## 2024-08-02 ENCOUNTER — OFFICE VISIT (OUTPATIENT)
Dept: SURGERY | Facility: CLINIC | Age: 49
End: 2024-08-02
Payer: COMMERCIAL

## 2024-08-02 DIAGNOSIS — L91.0 KELOID SCAR: Primary | ICD-10-CM

## 2024-08-02 PROCEDURE — 99213 OFFICE O/P EST LOW 20 MIN: CPT | Performed by: STUDENT IN AN ORGANIZED HEALTH CARE EDUCATION/TRAINING PROGRAM

## 2024-08-02 NOTE — PROGRESS NOTES
PRS    Doing well.  She was thinking about options for additional management.  She would like to consider a tummy tuck operation.  She understands that she may be at slightly increased risk of keloid formation.    On exam, she does have predominantly infraumbilical skin excess with lipodystrophy and rectus abdominis diastases in the setting of a BMI of 27.  No abdominal hernia.    A/P: 49-year-old female presenting for abdominal contouring    -Discussed options for management.  Do not be unreasonable to proceed with keloid excision along with abdominoplasty.  Adding liposuction would help me contour the trunk as well as the flanks and in my opinion is a superior aesthetic operation.  We will provide quotes for both.  She does understand that she is at a higher risk for poor scarring and she accepts this risk.  -Cosmetic quotes will be given  -Once patient decides on what to do, we will plan to schedule surgery as needed  -A total of 20 minutes was devoted to review of chart, direct face-to-face patient counseling and documentation during this encounter, exclusive of any procedure performed.    Paul Jamison MD, PhD

## 2024-08-02 NOTE — NURSING NOTE
Elizabeth Rivero's goals for this visit include: fv    She requests these members of her care team be copied on today's visit information: no    PCP: Shaina Mei    Referring Provider:  Referred Self, MD  No address on file    LMP 08/03/2021 (LMP Unknown)     Do you need any medication refills at today's visit? No    Lucy Lew LPN    .

## 2024-08-02 NOTE — LETTER
8/2/2024      Elizabeth Rivero  [address] [place] [address]      Dear Colleague,    Thank you for referring your patient, Elizabeth Rivero, to the [facility]. Please see a copy of my visit note below.    PRS    Doing well.  She was thinking about options for additional management.  She would like to consider a tummy tuck operation.  She understands that she may be at slightly increased risk of keloid formation.    On exam, she does have predominantly infraumbilical skin excess with lipodystrophy and rectus abdominis diastases in the setting of a BMI of 27.  No abdominal hernia.    A/P: 49-year-old female presenting for abdominal contouring    -Discussed options for management.  Do not be unreasonable to proceed with keloid excision along with abdominoplasty.  Adding liposuction would help me contour the trunk as well as the flanks and in my opinion is a superior aesthetic operation.  We will provide quotes for both.  She does understand that she is at a higher risk for poor scarring and she accepts this risk.  -Cosmetic quotes will be given  -Once patient decides on what to do, we will plan to schedule surgery as needed  -A total of 20 minutes was devoted to review of chart, direct face-to-face patient counseling and documentation during this encounter, exclusive of any procedure performed.    Paul Jamison MD, PhD        Again, thank you for allowing me to participate in the care of your patient.        Sincerely,        Paul Jamison MD

## 2024-08-15 SDOH — HEALTH STABILITY: PHYSICAL HEALTH: ON AVERAGE, HOW MANY MINUTES DO YOU ENGAGE IN EXERCISE AT THIS LEVEL?: 60 MIN

## 2024-08-15 SDOH — HEALTH STABILITY: PHYSICAL HEALTH: ON AVERAGE, HOW MANY DAYS PER WEEK DO YOU ENGAGE IN MODERATE TO STRENUOUS EXERCISE (LIKE A BRISK WALK)?: 5 DAYS

## 2024-08-15 ASSESSMENT — SOCIAL DETERMINANTS OF HEALTH (SDOH): HOW OFTEN DO YOU GET TOGETHER WITH FRIENDS OR RELATIVES?: NEVER

## 2024-08-15 ASSESSMENT — PATIENT HEALTH QUESTIONNAIRE - PHQ9
SUM OF ALL RESPONSES TO PHQ QUESTIONS 1-9: 13
SUM OF ALL RESPONSES TO PHQ QUESTIONS 1-9: 13
10. IF YOU CHECKED OFF ANY PROBLEMS, HOW DIFFICULT HAVE THESE PROBLEMS MADE IT FOR YOU TO DO YOUR WORK, TAKE CARE OF THINGS AT HOME, OR GET ALONG WITH OTHER PEOPLE: SOMEWHAT DIFFICULT

## 2024-08-16 ENCOUNTER — TELEPHONE (OUTPATIENT)
Dept: FAMILY MEDICINE | Facility: CLINIC | Age: 49
End: 2024-08-16

## 2024-08-16 ENCOUNTER — OFFICE VISIT (OUTPATIENT)
Dept: FAMILY MEDICINE | Facility: CLINIC | Age: 49
End: 2024-08-16
Payer: COMMERCIAL

## 2024-08-16 VITALS
SYSTOLIC BLOOD PRESSURE: 139 MMHG | OXYGEN SATURATION: 100 % | TEMPERATURE: 98.5 F | BODY MASS INDEX: 31.76 KG/M2 | HEIGHT: 64 IN | HEART RATE: 82 BPM | DIASTOLIC BLOOD PRESSURE: 86 MMHG | WEIGHT: 186 LBS

## 2024-08-16 DIAGNOSIS — E66.09 CLASS 1 OBESITY DUE TO EXCESS CALORIES WITHOUT SERIOUS COMORBIDITY WITH BODY MASS INDEX (BMI) OF 32.0 TO 32.9 IN ADULT: ICD-10-CM

## 2024-08-16 DIAGNOSIS — E66.811 CLASS 1 OBESITY DUE TO EXCESS CALORIES WITHOUT SERIOUS COMORBIDITY WITH BODY MASS INDEX (BMI) OF 32.0 TO 32.9 IN ADULT: ICD-10-CM

## 2024-08-16 DIAGNOSIS — F32.1 CURRENT MODERATE EPISODE OF MAJOR DEPRESSIVE DISORDER WITHOUT PRIOR EPISODE (H): ICD-10-CM

## 2024-08-16 DIAGNOSIS — Z13.220 LIPID SCREENING: ICD-10-CM

## 2024-08-16 DIAGNOSIS — E55.9 VITAMIN D DEFICIENCY: ICD-10-CM

## 2024-08-16 DIAGNOSIS — Z12.31 ENCOUNTER FOR SCREENING MAMMOGRAM FOR MALIGNANT NEOPLASM OF BREAST: ICD-10-CM

## 2024-08-16 DIAGNOSIS — Z00.00 ROUTINE GENERAL MEDICAL EXAMINATION AT A HEALTH CARE FACILITY: Primary | ICD-10-CM

## 2024-08-16 DIAGNOSIS — Z12.4 CERVICAL CANCER SCREENING: ICD-10-CM

## 2024-08-16 DIAGNOSIS — R63.5 WEIGHT GAIN: ICD-10-CM

## 2024-08-16 DIAGNOSIS — K21.9 LARYNGOPHARYNGEAL REFLUX: ICD-10-CM

## 2024-08-16 LAB
ANION GAP SERPL CALCULATED.3IONS-SCNC: 10 MMOL/L (ref 7–15)
BUN SERPL-MCNC: 6.2 MG/DL (ref 6–20)
CALCIUM SERPL-MCNC: 9.1 MG/DL (ref 8.8–10.4)
CHLORIDE SERPL-SCNC: 101 MMOL/L (ref 98–107)
CHOLEST SERPL-MCNC: 176 MG/DL
CREAT SERPL-MCNC: 0.85 MG/DL (ref 0.51–0.95)
EGFRCR SERPLBLD CKD-EPI 2021: 84 ML/MIN/1.73M2
ERYTHROCYTE [DISTWIDTH] IN BLOOD BY AUTOMATED COUNT: 13.4 % (ref 10–15)
FASTING STATUS PATIENT QL REPORTED: YES
FASTING STATUS PATIENT QL REPORTED: YES
GLUCOSE SERPL-MCNC: 104 MG/DL (ref 70–99)
HCO3 SERPL-SCNC: 27 MMOL/L (ref 22–29)
HCT VFR BLD AUTO: 41.3 % (ref 35–47)
HDLC SERPL-MCNC: 52 MG/DL
HGB BLD-MCNC: 13 G/DL (ref 11.7–15.7)
LDLC SERPL CALC-MCNC: 115 MG/DL
MCH RBC QN AUTO: 28.1 PG (ref 26.5–33)
MCHC RBC AUTO-ENTMCNC: 31.5 G/DL (ref 31.5–36.5)
MCV RBC AUTO: 89 FL (ref 78–100)
NONHDLC SERPL-MCNC: 124 MG/DL
PLATELET # BLD AUTO: 201 10E3/UL (ref 150–450)
POTASSIUM SERPL-SCNC: 3.9 MMOL/L (ref 3.4–5.3)
RBC # BLD AUTO: 4.62 10E6/UL (ref 3.8–5.2)
SODIUM SERPL-SCNC: 138 MMOL/L (ref 135–145)
TRIGL SERPL-MCNC: 46 MG/DL
TSH SERPL DL<=0.005 MIU/L-ACNC: 2.49 UIU/ML (ref 0.3–4.2)
VIT D+METAB SERPL-MCNC: 19 NG/ML (ref 20–50)
WBC # BLD AUTO: 4.7 10E3/UL (ref 4–11)

## 2024-08-16 PROCEDURE — 90471 IMMUNIZATION ADMIN: CPT | Performed by: INTERNAL MEDICINE

## 2024-08-16 PROCEDURE — 90715 TDAP VACCINE 7 YRS/> IM: CPT | Performed by: INTERNAL MEDICINE

## 2024-08-16 PROCEDURE — 85027 COMPLETE CBC AUTOMATED: CPT | Performed by: INTERNAL MEDICINE

## 2024-08-16 PROCEDURE — 36415 COLL VENOUS BLD VENIPUNCTURE: CPT | Performed by: INTERNAL MEDICINE

## 2024-08-16 PROCEDURE — 82306 VITAMIN D 25 HYDROXY: CPT | Performed by: INTERNAL MEDICINE

## 2024-08-16 PROCEDURE — 84443 ASSAY THYROID STIM HORMONE: CPT | Performed by: INTERNAL MEDICINE

## 2024-08-16 PROCEDURE — 99214 OFFICE O/P EST MOD 30 MIN: CPT | Mod: 25 | Performed by: INTERNAL MEDICINE

## 2024-08-16 PROCEDURE — 87624 HPV HI-RISK TYP POOLED RSLT: CPT | Performed by: INTERNAL MEDICINE

## 2024-08-16 PROCEDURE — G0145 SCR C/V CYTO,THINLAYER,RESCR: HCPCS | Performed by: INTERNAL MEDICINE

## 2024-08-16 PROCEDURE — 80061 LIPID PANEL: CPT | Performed by: INTERNAL MEDICINE

## 2024-08-16 PROCEDURE — 99396 PREV VISIT EST AGE 40-64: CPT | Mod: 25 | Performed by: INTERNAL MEDICINE

## 2024-08-16 PROCEDURE — 80048 BASIC METABOLIC PNL TOTAL CA: CPT | Performed by: INTERNAL MEDICINE

## 2024-08-16 RX ORDER — OMEPRAZOLE 40 MG/1
40 CAPSULE, DELAYED RELEASE ORAL DAILY
Qty: 90 CAPSULE | Refills: 0 | Status: SHIPPED | OUTPATIENT
Start: 2024-08-16

## 2024-08-16 ASSESSMENT — PAIN SCALES - GENERAL: PAINLEVEL: SEVERE PAIN (6)

## 2024-08-16 NOTE — TELEPHONE ENCOUNTER
Pt is requesting Semaglutide-Weight Management (WEGOVY) 0.25 MG/0.5ML pen prescription be sent to the [facility] pharmacy as her insurance is not covering it.      Pt would like a call once this has been sent.  Stacy Guzman, CMA

## 2024-08-16 NOTE — TELEPHONE ENCOUNTER
Hello,    We received a referral for this patient to schedule an MTM appointment for the [facility] weight management program. In order for us to schedule the visit, the patient has to meet either one of the two clinical criteria per insurance guidelines for 2024:    BMI over 40kg at start of medication  2.   BMI over 30kg at start of medication-with diagnosis of non-alcoholic fatty liver    It does not look like the patient qualifies based on the criteria in the chart. We have been instructed to refer patients back to the provider for alternative options if they do not meet criteria as we would be unable to schedule them an MTM visit with weight management and the GLP 1 medication would not be covered. If the patient does meet criteria, we would need that documentation updated in the referral notes.    Thank you,   Katelyn Coombs, [facility]  MTM

## 2024-08-16 NOTE — TELEPHONE ENCOUNTER
She doesn't meet BMI criteria.   Pt opted self pay route. See separate encounter of the same date.

## 2024-08-16 NOTE — PROGRESS NOTES
Prior to immunization administration, verified patients identity using patient s name and date of birth. Please see Immunization Activity for additional information.     Screening Questionnaire for Adult Immunization    Are you sick today?   No   Do you have allergies to medications, food, a vaccine component or latex?   No   Have you ever had a serious reaction after receiving a vaccination?   No   Do you have a long-term health problem with heart, lung, kidney, or metabolic disease (e.g., diabetes), asthma, a blood disorder, no spleen, complement component deficiency, a cochlear implant, or a spinal fluid leak?  Are you on long-term aspirin therapy?   No   Do you have cancer, leukemia, HIV/AIDS, or any other immune system problem?   No   Do you have a parent, brother, or sister with an immune system problem?   No   In the past 3 months, have you taken medications that affect  your immune system, such as prednisone, other steroids, or anticancer drugs; drugs for the treatment of rheumatoid arthritis, Crohn s disease, or psoriasis; or have you had radiation treatments?   No   Have you had a seizure, or a brain or other nervous system problem?   No   During the past year, have you received a transfusion of blood or blood    products, or been given immune (gamma) globulin or antiviral drug?   No   For women: Are you pregnant or is there a chance you could become       pregnant during the next month?   No   Have you received any vaccinations in the past 4 weeks?   No     Immunization questionnaire answers were all negative.      Patient instructed to remain in clinic for 15 minutes afterwards, and to report any adverse reactions.     Screening performed by Shasta Lane MA on 8/16/2024 at 1:44 PM.

## 2024-08-16 NOTE — PATIENT INSTRUCTIONS
Patient Education   Preventive Care Advice   This is general advice given by our system to help you stay healthy. However, your care team may have specific advice just for you. Please talk to your care team about your preventive care needs.  Nutrition  Eat 5 or more servings of fruits and vegetables each day.  Try wheat bread, brown rice and whole grain pasta (instead of white bread, rice, and pasta).  Get enough calcium and vitamin D. Check the label on foods and aim for 100% of the RDA (recommended daily allowance).  Lifestyle  Exercise at least 150 minutes each week  (30 minutes a day, 5 days a week).  Do muscle strengthening activities 2 days a week. These help control your weight and prevent disease.  No smoking.  Wear sunscreen to prevent skin cancer.  Have a dental exam and cleaning every 6 months.  Yearly exams  See your health care team every year to talk about:  Any changes in your health.  Any medicines your care team has prescribed.  Preventive care, family planning, and ways to prevent chronic diseases.  Shots (vaccines)   HPV shots (up to age 26), if you've never had them before.  Hepatitis B shots (up to age 59), if you've never had them before.  COVID-19 shot: Get this shot when it's due.  Flu shot: Get a flu shot every year.  Tetanus shot: Get a tetanus shot every 10 years.  Pneumococcal, hepatitis A, and RSV shots: Ask your care team if you need these based on your risk.  Shingles shot (for age 50 and up)  General health tests  Diabetes screening:  Starting at age 35, Get screened for diabetes at least every 3 years.  If you are younger than age 35, ask your care team if you should be screened for diabetes.  Cholesterol test: At age 39, start having a cholesterol test every 5 years, or more often if advised.  Bone density scan (DEXA): At age 50, ask your care team if you should have this scan for osteoporosis (brittle bones).  Hepatitis C: Get tested at least once in your life.  STIs (sexually  transmitted infections)  Before age 24: Ask your care team if you should be screened for STIs.  After age 24: Get screened for STIs if you're at risk. You are at risk for STIs (including HIV) if:  You are sexually active with more than one person.  You don't use condoms every time.  You or a partner was diagnosed with a sexually transmitted infection.  If you are at risk for HIV, ask about PrEP medicine to prevent HIV.  Get tested for HIV at least once in your life, whether you are at risk for HIV or not.  Cancer screening tests  Cervical cancer screening: If you have a cervix, begin getting regular cervical cancer screening tests starting at age 21.  Breast cancer scan (mammogram): If you've ever had breasts, begin having regular mammograms starting at age 40. This is a scan to check for breast cancer.  Colon cancer screening: It is important to start screening for colon cancer at age 45.  Have a colonoscopy test every 10 years (or more often if you're at risk) Or, ask your provider about stool tests like a FIT test every year or Cologuard test every 3 years.  To learn more about your testing options, visit:   .  For help making a decision, visit:   https://bit.ly/nm95789.  Prostate cancer screening test: If you have a prostate, ask your care team if a prostate cancer screening test (PSA) at age 55 is right for you.  Lung cancer screening: If you are a current or former smoker ages 50 to 80, ask your care team if ongoing lung cancer screenings are right for you.  For informational purposes only. Not to replace the advice of your health care provider. Copyright   2023 [facility]. All rights reserved. Clinically reviewed by the [facility]. [employer] 567498 - REV 01/24.  Learning About Depression Screening  What is depression screening?  Depression screening is a way to see if you have depression symptoms. It may be done by a doctor or counselor. It's often part of a routine  "checkup. That's because your mental health is just as important as your physical health.  Depression is a mental health condition that affects how you feel, think, and act. You may:  Have less energy.  Lose interest in your daily activities.  Feel sad and grouchy for a long time.  Depression is very common. It affects people of all ages.  Many things can lead to depression. Some people become depressed after they have a stroke or find out they have a major illness like cancer or heart disease. The death of a loved one or a breakup may lead to depression. It can run in families. Most experts believe that a combination of inherited genes and stressful life events can cause it.  What happens during screening?  You may be asked to fill out a form about your depression symptoms. You and the doctor will discuss your answers. The doctor may ask you more questions to learn more about how you think, act, and feel.  What happens after screening?  If you have symptoms of depression, your doctor will talk to you about your options.  Doctors usually treat depression with medicines or counseling. Often, combining the two works best. Many people don't get help because they think that they'll get over the depression on their own. But people with depression may not get better unless they get treatment.  The cause of depression is not well understood. There may be many factors involved. But if you have depression, it's not your fault.  A serious symptom of depression is thinking about death or suicide. If you or someone you care about talks about this or about feeling hopeless, get help right away.  It's important to know that depression can be treated. Medicine, counseling, and self-care may help.  Where can you learn more?  Go to https://www.[employer].net/patiented  Enter T185 in the search box to learn more about \"Learning About Depression Screening.\"  Current as of: June 24, 2023  Content Version: 14.1 2006-2024 [employer] " [employer].   Care instructions adapted under license by your healthcare professional. If you have questions about a medical condition or this instruction, always ask your healthcare professional. [employer] Inc[employer] disclaims any warranty or liability for your use of this information.

## 2024-08-16 NOTE — TELEPHONE ENCOUNTER
Retail Pharmacy Prior Authorization Team   Phone: [phone]      Hello,   This patient falls under the new rules of weight loss injectables for [employer].   Patient must follow the below steps to start/continue their therapy for weight management.    There are three conditions to coverage:   o You need to meet the initial clinical criteria of having a BMI of 40kg/m2   or over.   o The medication will only be covered if it is prescribed through a hospital-based   Medication Therapy Management (MTM) program at the [facility]. Visits can be in-person, virtual via video, or over the   phone.   ? You will need to establish care through this program. Scheduling has not yet started.   You will receive more scheduling information in the upcoming weeks.   o To qualify for coverage, your prescription for Wegovy, Zepbound or Saxenda must be filled through   the [facility] at [phone] or [phone].   ? To support this transition, your current prior authorization (existing clinical criteria   approval at [facility]) will be extended until February 28, 2024. To avoid a gap in care,   you will need to schedule an MTM appointment in January or February.

## 2024-08-16 NOTE — TELEPHONE ENCOUNTER
Let pt know Rx sent. She can contact the pharmacy for cost info.    [facility]   Phone: [phone]

## 2024-08-16 NOTE — PROGRESS NOTES
"Preventive Care Visit  [facility]  Shaina Mei MD PhD, Internal Medicine - Pediatrics  Aug 16, 2024      Assessment & Plan     Elizabeth was seen today for physical.    Diagnoses and all orders for this visit:    Routine general medical examination at a health care facility  -     REVIEW OF HEALTH MAINTENANCE PROTOCOL ORDERS  -     TDAP 10-64Y (ADACEL,BOOSTRIX)  -     PRIMARY CARE FOLLOW-UP SCHEDULING; Future    Cervical cancer screening  Comments:  history of cone biopsy in [place] greater than 5 years ago. If pap/HPV negative, can go every 5 years.  Orders:  -     Gynecologic Cytology (Pap) and HPV - Recommended Age 30-65 Years  -     MA Screen Bilateral w/Cole; Future    Laryngopharyngeal reflux  Comments:  her cough was probably from laryngeal reflux. Omeprazole. life style modification.   Orders:  -     omeprazole (PRILOSEC) 40 MG DR capsule; Take 1 capsule (40 mg) by mouth daily    Class 1 obesity due to excess calories without serious comorbidity with body mass index (BMI) of 32.0 to 32.9 in adult  -     Semaglutide-Weight Management (WEGOVY) 0.25 MG/0.5ML pen; Inject 0.25 mg subcutaneously once a week    Weight gain  -     TSH with free T4 reflex; Future  -     CBC with platelets; Future  -     Basic metabolic panel  (Ca, Cl, CO2, Creat, Gluc, K, Na, BUN); Future    Encounter for screening mammogram for malignant neoplasm of breast  -     MA Screen Bilateral w/Cole; Future    Vitamin D deficiency  -     Vitamin D Deficiency; Future    Lipid screening  -     Lipid panel reflex to direct LDL Fasting; Future    Current moderate episode of major depressive disorder without prior episode (H)  Comments:  pt declined counseling or medication. her  is supportive.             BMI  Estimated body mass index is 32.23 kg/m  as calculated from the following:    Height as of this encounter: 1.618 m (5' 3.7\").    Weight as of this encounter: 84.4 kg (186 lb).   Weight management plan: " Wegovy    Depression Screening Follow Up        8/15/2024     3:54 PM   PHQ   PHQ-9 Total Score 13   Q9: Thoughts of better off dead/self-harm past 2 weeks Not at all           Follow Up Actions Taken  Patient declined referral.     Counseling  Appropriate preventive services were addressed with this patient via screening, questionnaire, or discussion as appropriate for fall prevention, nutrition, physical activity, Tobacco-use cessation, social engagement, weight loss and cognition.  Checklist reviewing preventive services available has been given to the patient.  Reviewed patient's diet, addressing concerns and/or questions.   Patient is at risk for social isolation and has been provided with information about the benefit of social connection.   The patient's PHQ-9 score is consistent with moderate depression. She was provided with information regarding depression.     Virtual visit follow up on weight management in 2 months.  Physical in one year.     Teo Johnson is a 49 year old, presenting for the following:  Physical        8/16/2024    12:46 PM   Additional Questions   Roomed by jonelle   Accompanied by Sage- Spouse        Health Care Directive  Patient does not have a Health Care Directive or Living Will: Discussed advance care planning with patient; information given to patient to review.      Pain right thigh outside of the thigh for 6-7 months. Feel it more with walking. Sometimes sitting. No pain during sleep.       Cough comes and goes for several years.   Previously thought this was told it was laryngeal reflux, have her take omeprazole but she doesn't take it.     Gained 46 lbs last year.   She took Rybelsus for weight loss, it worked well.   Now weight came back.     Phq9 came back positive, moderate depression.  Pt acknowledged depression but declined counseling or meds.  Reported she wanted to have kids, failed IVF, found to have endometriosis, ended up with hysterectomy. Her  is  her support.             8/15/2024   General Health   How would you rate your overall physical health? (!) FAIR   Feel stress (tense, anxious, or unable to sleep) To some extent      (!) STRESS CONCERN      8/15/2024   Nutrition   Three or more servings of calcium each day? Yes   Diet: Regular (no restrictions)   How many servings of fruit and vegetables per day? (!) 2-3   How many sweetened beverages each day? 0-1            8/15/2024   Exercise   Days per week of moderate/strenous exercise 5 days   Average minutes spent exercising at this level 60 min            8/15/2024   Social Factors   Frequency of gathering with friends or relatives Never   Worry food won't last until get money to buy more No   Food not last or not have enough money for food? No   Do you have housing? (Housing is defined as stable permanent housing and does not include staying ouside in a car, in a tent, in an abandoned building, in an overnight shelter, or couch-surfing.) Yes   Are you worried about losing your housing? No   Lack of transportation? No   Unable to get utilities (heat,electricity)? No      (!) SOCIAL CONNECTIONS CONCERN      8/15/2024   Dental   Dentist two times every year? Yes            8/15/2024   TB Screening   Were you born outside of the [place]? Yes          Today's PHQ-9 Score:       8/15/2024     3:54 PM   PHQ-9 SCORE   PHQ-9 Total Score MyChart 13 (Moderate depression)   PHQ-9 Total Score 13         8/15/2024   Substance Use   Alcohol more than 3/day or more than 7/wk No   Do you use any other substances recreationally? No        Social History     Tobacco Use    Smoking status: Never    Smokeless tobacco: Never   Vaping Use    Vaping status: Never Used   Substance Use Topics    Alcohol use: Yes     Comment: rare    Drug use: No           10/27/2022   LAST FHS-7 RESULTS   1st degree relative breast or ovarian cancer No   Any relative bilateral breast cancer No   Any male have breast cancer No   Any ONE woman have BOTH  "breast AND ovarian cancer No   Any woman with breast cancer before 50yrs No   2 or more relatives with breast AND/OR ovarian cancer No   2 or more relatives with breast AND/OR bowel cancer No          Mammogram Screening - Mammogram every 1-2 years updated in Health Maintenance based on mutual decision making        8/15/2024   STI Screening   New sexual partner(s) since last STI/HIV test? No        History of abnormal Pap smear: No - age 30- 64 PAP with HPV every 5 years recommended        Latest Ref Rng & Units 2/8/2021     2:18 PM 2/8/2021     2:05 PM 2/28/2017    12:20 PM   PAP / HPV   PAP (Historical)  NIL      HPV 16 DNA NEG^Negative  Negative  Negative    HPV 18 DNA NEG^Negative  Negative  Negative    Other HR HPV NEG^Negative  Negative  Negative      ASCVD Risk   The 10-year ASCVD risk score (Chris FLOREZ, et al., 2019) is: 2%    Values used to calculate the score:      Age: 49 years      Sex: Female      Is Non- : Yes      Diabetic: No      Tobacco smoker: No      Systolic Blood Pressure: 139 mmHg      Is BP treated: No      HDL Cholesterol: 53 mg/dL      Total Cholesterol: 155 mg/dL      Reviewed and updated as needed this visit by Provider                          Review of Systems  Constitutional, HEENT, cardiovascular, pulmonary, gi and gu systems are negative, except as otherwise noted.     Objective    Exam  /86 (BP Location: Right arm, Patient Position: Sitting, Cuff Size: Adult Regular)   Pulse 82   Temp 98.5  F (36.9  C) (Oral)   Ht 1.618 m (5' 3.7\")   Wt 84.4 kg (186 lb)   LMP 08/03/2021 (LMP Unknown)   SpO2 100%   BMI 32.23 kg/m     Estimated body mass index is 32.23 kg/m  as calculated from the following:    Height as of this encounter: 1.618 m (5' 3.7\").    Weight as of this encounter: 84.4 kg (186 lb).    Physical Exam  GENERAL: alert and no distress  EYES: Eyes grossly normal to inspection, PERRL and conjunctivae and sclerae normal  HENT: ear canals " and TM's normal, nose and mouth without ulcers or lesions  NECK: no adenopathy, no asymmetry, masses, or scars  RESP: lungs clear to auscultation - no rales, rhonchi or wheezes  CV: regular rate and rhythm, normal S1 S2, no S3 or S4, no murmur, click or rub, no peripheral edema  ABDOMEN: soft, nontender, no hepatosplenomegaly, no masses and bowel sounds normal   (female): normal female external genitalia, normal urethral meatus, normal vaginal mucosa  MS: no gross musculoskeletal defects noted, no edema  SKIN: no suspicious lesions or rashes  NEURO: Normal strength and tone, mentation intact and speech normal  PSYCH: mentation appears normal, affect normal/bright        Signed Electronically by: Shaina Mei MD PhD    Answers submitted by the patient for this visit:  Patient Health Questionnaire (Submitted on 8/15/2024)  If you checked off any problems, how difficult have these problems made it for you to do your work, take care of things at home, or get along with other people?: Somewhat difficult  PHQ9 TOTAL SCORE: 13

## 2024-08-19 LAB
HPV HR 12 DNA CVX QL NAA+PROBE: NEGATIVE
HPV16 DNA CVX QL NAA+PROBE: NEGATIVE
HPV18 DNA CVX QL NAA+PROBE: NEGATIVE
HUMAN PAPILLOMA VIRUS FINAL DIAGNOSIS: NORMAL

## 2024-08-20 ENCOUNTER — TELEPHONE (OUTPATIENT)
Dept: FAMILY MEDICINE | Facility: CLINIC | Age: 49
End: 2024-08-20
Payer: COMMERCIAL

## 2024-08-22 ENCOUNTER — TELEPHONE (OUTPATIENT)
Dept: FAMILY MEDICINE | Facility: CLINIC | Age: 49
End: 2024-08-22
Payer: COMMERCIAL

## 2024-08-22 LAB
BKR LAB AP GYN ADEQUACY: NORMAL
BKR LAB AP GYN INTERPRETATION: NORMAL
BKR LAB AP PREVIOUS ABNORMAL: NORMAL
PATH REPORT.COMMENTS IMP SPEC: NORMAL
PATH REPORT.COMMENTS IMP SPEC: NORMAL
PATH REPORT.RELEVANT HX SPEC: NORMAL

## 2024-08-22 NOTE — TELEPHONE ENCOUNTER
Patient calling to schedule appointment with MTM. Patient was not sure why she didn't meet criteria for wt loss MTM. Writer explained why, and that a Primary MTM could help as well. Explained compounding Pharmacy for employees (. Also that Wegovy had a prior auth that was sent today from PCP. Advised patient to reach out to PCP in a week regarding Wegovy. Explained if that was not covered she could still get the medication from compounding pharmacy at a cost. Also that Primary MTM could help with this process as well. She would like to wait to schedule until after prior auth is determined.  Wendy Cohen, [facility]  MTM

## 2024-08-22 NOTE — TELEPHONE ENCOUNTER
Central Prior Authorization Team  Phone: [phone]    PA Initiation    Medication: WEGOVY 0.25 MG/0.5ML SC SOAJ  Insurance Company: [employer] - Phone [phone] Fax [phone]  Pharmacy Filling the Rx: [facility] - [place], [place] - [address]  Filling Pharmacy Phone:    Filling Pharmacy Fax:    Start Date: 8/22/2024

## 2024-08-26 ENCOUNTER — TELEPHONE (OUTPATIENT)
Dept: FAMILY MEDICINE | Facility: CLINIC | Age: 49
End: 2024-08-26
Payer: COMMERCIAL

## 2024-08-26 NOTE — TELEPHONE ENCOUNTER
PRIOR AUTHORIZATION DENIED    Medication: WEGOVY 0.25 MG/0.5ML SC SOAJ  Insurance Company: [employer] - Phone [phone] Fax [phone]  Denial Date: 2024    Denial Reason(s):   If you are requesting Wegovy for weight loss or weight management, approval also requires:  A. You are currently enrolled in the [facility]  Medication Therapy Management (MTM) Clinic at the [facility] at [phone] AND your prescription was authorized by prescriber Kimmy Landin or Nesha Bardales.  B. You meet ONE of the followin. Body mass index (BMI: a tool for evaluating body fat) of 40 kg/m(2) or greater  2. BMI of 30 kg/m(2) or greater AND evidence of MASLD (metabolic dysfunction associated  fatty liver disease).     Appeal Information: If provider would like to appeal please provide a letter of medical necessity.

## 2024-08-26 NOTE — TELEPHONE ENCOUNTER
MT referral from: [facility] visit (referral by provider)    MT referral outreach attempt #2 on August 26, 2024 at 11:43 AM      Outcome: Patient not reachable after several attempts, sent [employer] message    Use   RENU selby    for the carrier/Plan on the flowsheet      MyChart Message Sent    JESSE Cerrato   [phone]

## 2024-10-14 ENCOUNTER — ANCILLARY PROCEDURE (OUTPATIENT)
Dept: MAMMOGRAPHY | Facility: CLINIC | Age: 49
End: 2024-10-14
Attending: INTERNAL MEDICINE
Payer: COMMERCIAL

## 2024-10-14 DIAGNOSIS — Z12.4 CERVICAL CANCER SCREENING: ICD-10-CM

## 2024-10-14 DIAGNOSIS — Z12.31 ENCOUNTER FOR SCREENING MAMMOGRAM FOR MALIGNANT NEOPLASM OF BREAST: ICD-10-CM

## 2024-10-14 PROCEDURE — 77063 BREAST TOMOSYNTHESIS BI: CPT | Performed by: RADIOLOGY

## 2024-10-14 PROCEDURE — 77067 SCR MAMMO BI INCL CAD: CPT | Performed by: RADIOLOGY

## 2024-12-26 NOTE — PROGRESS NOTES
SUBJECTIVE:                                                   Elizabeth Jerome is a 44 year old female who presents to clinic today for the following health issue(s):  Patient presents with:  Post-op Visit:  OPERATIVE HYSTEROSCOPY WITH MYOSURE      HPI:  Elizabeth has improved symptoms. Her bleeding stopped two days ago. She is having difficulty with CRM as she was told she was supposed to have an SIS and a trial transfer today and it was not scheduled.     Patient's last menstrual period was 2019..   Patient is not sexually active, .  Using not sexually active for contraception.    reports that she has never smoked. She has never used smokeless tobacco.    STD testing offered?  Declined    Health maintenance updated:  yes    Problem list and histories reviewed & adjusted, as indicated.  Additional history: as documented.    Patient Active Problem List   Diagnosis     Intramural leiomyoma of uterus     Past Surgical History:   Procedure Laterality Date      SECTION  2003     GYN SURGERY  10/2014, 2002    Abdominal Myomectomies x 2     OPERATIVE HYSTEROSCOPY WITH MORCELLATOR N/A 2018    Procedure: OPERATIVE HYSTEROSCOPY WITH MORCELLATOR FOR MYOMECTOMY(MYOSURE);  Surgeon: Laura Mixon MD;  [facility]     OPERATIVE HYSTEROSCOPY WITH MORCELLATOR N/A 2019    Procedure: OPERATIVE HYSTEROSCOPY WITH MYOSURE;  Surgeon: Laura Mixon MD;  Location:  OR      Social History     Tobacco Use     Smoking status: Never Smoker     Smokeless tobacco: Never Used   Substance Use Topics     Alcohol use: Yes     Alcohol/week: 0.0 oz     Comment: rare      Problem (# of Occurrences) Relation (Name,Age of Onset)    Diabetes (1) Maternal Grandmother    Hyperlipidemia (2) Mother, Brother    Hypertension (2) Mother, Father    No Known Problems (4) Sister, Maternal Grandfather, Paternal Grandmother, Other            Current Outpatient Medications   Medication  Sig     Ferrous Sulfate (IRON SUPPLEMENT PO) Take 325 mg by mouth daily      Prenatal Vit-Fe Fumarate-FA (PRENATAL PO)      tranexamic acid (LYSTEDA) 650 MG tablet Take 2 tablets (1,300 mg) by mouth 3 times daily for 5 days     No current facility-administered medications for this visit.      No Known Allergies    ROS:  12 point review of systems negative other than symptoms noted below.    OBJECTIVE:     /78   Wt 72.1 kg (159 lb)   LMP 08/08/2019   Breastfeeding? No   BMI 27.72 kg/m    Body mass index is 27.72 kg/m .    Exam:  Constitutional:  Appearance: Well nourished, well developed alert, in no acute distress  Skin:General Inspection:  No rashes present, no lesions present, no areas of discoloration; Genitalia and Groin:  No rashes present, no lesions present, no areas of discoloration, no masses present.  Neurologic/Psychiatric:  Mental Status:  Oriented X3   Cervix:no bleeding    In-Clinic Test Results:  No results found for this or any previous visit (from the past 24 hour(s)).    ASSESSMENT/PLAN:                                                        ICD-10-CM    1. Abnormal uterine bleeding (AUB) N93.9 Hemoglobin   2. Intramural and submucous leiomyoma of uterus D25.1 Hemoglobin    D25.0      Withdrawal bleeding has resolved with tranexamic acid. That bleeding episode coincided to her menses therefore we will monitor for now as she is planning another IVF cycle. We will recheck her hemoglobin today. Continue the oral iron. I encouraged her to call CRM back as time is of the essence due to her age and heavy menses.  Post-pregnancy we discussed definitive management of her AUB-L with a hysterectomy.     Laura Mixon MD  [facility] [place]   Alcohol abuse

## 2025-03-03 ENCOUNTER — TELEPHONE (OUTPATIENT)
Dept: FAMILY MEDICINE | Facility: CLINIC | Age: 50
End: 2025-03-03
Payer: COMMERCIAL

## 2025-03-03 NOTE — TELEPHONE ENCOUNTER
General Call      Reason for Call:  Put on cancellation list    What are your questions or concerns:   patient would like to be called to be seen if there are any cancellations for a sooner appt. She would like to be seen for pain by her left abdominal side. She also feels like she may have a sinus infection. She always have blood, crust in her nose. She always stuffed up and it makes it hard to breath. If there's an office visit spot open sooner, she'll take it. If theres a preventative spot open sooner, she will take that also as she does want to be seen sooner for her annual and abdominal issue    Date of last appointment with provider:     Could we send this information to you in [place] or would you prefer to receive a phone call?:   Patient would prefer a phone call   Okay to leave a detailed message?: Yes at Cell number on file:    Telephone Information:   Mobile [phone]

## 2025-03-04 NOTE — TELEPHONE ENCOUNTER
First attempt. Left message for patient to return call to clinic per Shaina Mei MD as noted:    Please triage to see if this is appropriate for same-day or ADS in regards to the abdominal pain. There are SD available this week.     Patient to be triaged for Abdominal pain, possible sinus infection.     RN, if/when patient returns call, please review message as shown. Clay Puente RN, BSN

## 2025-03-04 NOTE — TELEPHONE ENCOUNTER
Please triage to see if this is appropriate for same-day or ADS in regards to the abdominal pain. There are SD available this week.

## 2025-03-05 NOTE — TELEPHONE ENCOUNTER
Second attempt. Left message for patient to return call to clinic per Shaina Mei MD as noted:     Please triage to see if this is appropriate for same-day or ADS in regards to the abdominal pain. There are SD available this week.      Patient to be triaged for Abdominal pain, possible sinus infection.      RN, if/when patient returns call, please review message as shown. Clay Puente RN, BSN

## 2025-03-06 ENCOUNTER — NURSE TRIAGE (OUTPATIENT)
Dept: FAMILY MEDICINE | Facility: CLINIC | Age: 50
End: 2025-03-06
Payer: COMMERCIAL

## 2025-03-06 NOTE — TELEPHONE ENCOUNTER
"Reason for Disposition   Abdominal pain is a chronic symptom (recurrent or ongoing AND lasting > 4 weeks)    Additional Information   Negative: SEVERE difficulty breathing (e.g., struggling for each breath, speaks in single words, pulse > 120)   Negative: Breathing stopped and hasn't returned   Negative: Choking on something   Negative: Bluish (or gray) lips or face   Negative: Difficult to awaken or acting confused (e.g., disoriented, slurred speech)   Negative: Passed out (e.g., fainted, lost consciousness, blacked out and was not responding)   Negative: Wheezing started suddenly after medicine, an allergic food, or bee sting   Negative: Stridor (harsh sound while breathing in)   Negative: Slow, shallow and weak breathing   Negative: Sounds like a life-threatening emergency to the triager   Negative: Chest pain   Negative: Wheezing (high pitched whistling sound) and previous asthma attacks or use of asthma medicines   Negative: Breathing difficulty and within 14 days of COVID-19 EXPOSURE (close contact) with someone diagnosed with COVID-19 (e.g., COVID test positive)   Negative: Breathing difficulty and COVID-19 is widespread in the community   Negative: Breathing diffculty and only present when coughing   Negative: Breathing difficulty and only from stuffy nose   Negative: Breathing diffculty and only from stuffy nose or runny nose from common cold   Negative: MODERATE difficulty breathing (e.g., speaks in phrases, SOB even at rest, pulse 100-120) of new-onset or worse than normal   Negative: Oxygen level (e.g., pulse oximetry) 90% or lower   Negative: Wheezing can be heard across the room   Negative: Drooling or spitting out saliva (because can't swallow)   Negative: Any history of prior \"blood clot\" in leg or lungs   Negative: Illness requiring prolonged bedrest in past month (e.g., immobilization, long hospital stay)   Negative: Hip or leg fracture (broken bone) in past month (or had cast on leg or ankle in " past month)   Negative: Major surgery in the past month   Negative: Long-distance travel in past month (e.g., car, bus, train, plane; with trip lasting 6 or more hours)   Negative: Cancer treatment in past six months (or has cancer now)   Negative: Extra heartbeats, irregular heart beating, or heart is beating very fast (i.e., 'palpitations')   Negative: Fever > 103 F (39.4 C)   Negative: Fever > 101 F (38.3 C) and over 60 years of age   Negative: Fever > 100 F (37.8 C) and bedridden (e.g., nursing home patient, stroke, chronic illness, recovering from surgery)   Negative: Fever > 100 F (37.8 C) and diabetes mellitus or weak immune system (e.g., HIV positive, cancer chemo, splenectomy, organ transplant, chronic steroids)   Negative: Periods where breathing stops and then resumes normally and bedridden (e.g., nursing home patient, CVA)   Negative: Pregnant or postpartum (from 0 to 6 weeks after delivery)   Negative: Patient sounds very sick or weak to the triager   Negative: MILD difficulty breathing (e.g., minimal/no SOB at rest, SOB with walking, pulse < 100) of new-onset or worse than normal   Negative: Longstanding difficulty breathing (e.g., CHF, COPD, emphysema) and worse than normal   Negative: Longstanding difficulty breathing and not responding to usual therapy   Negative: Continuous (nonstop) coughing   Negative: Patient wants to be seen   Negative: Oxygen level (e.g., pulse oximetry) 91 to 94%   Negative: MODERATE longstanding difficulty breathing (e.g., speaks in phrases, SOB even at rest, pulse 100-120) and SAME as normal   Negative: Passed out (e.g., fainted, lost consciousness, blacked out and was not responding)   Negative: Shock suspected (e.g., cold/pale/clammy skin, too weak to stand, low BP, rapid pulse)   Negative: Sounds like a life-threatening emergency to the triager   Negative: Followed an abdomen (stomach) injury   Negative: Chest pain   Negative: Abdominal pain and pregnant < 20 weeks    Negative: Abdominal pain and pregnant 20 or more weeks   Negative: Pain is mainly in upper abdomen (if needed ask: 'is it mainly above the belly button?')   Negative: Abdomen bloating or swelling are main symptoms   Negative: SEVERE abdominal pain (e.g., excruciating)   Negative: Vomiting red blood or black (coffee ground) material   Negative: Blood in bowel movements  (Exception: Blood on surface of BM with constipation.)   Negative: Black or tarry bowel movements  (Exception: Chronic-unchanged black-grey BMs AND is taking iron pills or Pepto-Bismol.)   Negative: MILD TO MODERATE constant pain lasting > 2 hours   Negative: MILD TO MODERATE constant pain lasting > 2 hours, and age > 60 years   Negative: Vomiting bile (green color)   Negative: Patient sounds very sick or weak to the triager   Negative: Vomiting and abdomen looks much more swollen than usual   Negative: White of the eyes have turned yellow (i.e., jaundice)   Negative: Blood in urine (red, pink, or tea-colored)   Negative: Fever > 103 F (39.4 C)   Negative: Fever > 101 F (38.3 C) and over 60 years of age   Negative: Fever > 100 F (37.8 C) and has diabetes mellitus or a weak immune system (e.g., HIV positive, cancer chemotherapy, organ transplant, splenectomy, chronic steroids)   Negative: Fever > 100 F (37.8 C) and bedridden (e.g., CVA, chronic illness, recovering from surgery)   Negative: MODERATE pain (e.g., interferes with normal activities that comes and goes (cramps) lasts > 24 hours  (Exception: Pain with Vomiting or Diarrhea - see that Protocol.)   Negative: Unusual vaginal discharge   Negative: Pregnancy suspected (e.g., missed last menstrual period)   Negative: Patient wants to be seen   Negative: MILD longstanding difficulty breathing (e.g., minimal/no SOB at rest, SOB with walking, pulse <100) and SAME as normal   Negative: MILD pain (e.g., does not interfere with normal activities) and pain comes and goes (cramps) lasts > 48 hours   "(Exception: This same abdominal pain is a chronic symptom recurrent or ongoing AND present > 4 weeks.)    Answer Assessment - Initial Assessment Questions  1. LOCATION: \"Where does it hurt?\"   Lower L abdomen  2. RADIATION: \"Does the pain shoot anywhere else?\" (e.g., chest, back)      no  3. ONSET: \"When did the pain begin?\" (e.g., minutes, hours or days ago)       2 weeks  4. SUDDEN: \"Gradual or sudden onset?\"      Sudden   5. PATTERN \"Does the pain come and go, or is it constant?\"      Intermittent   6. SEVERITY: \"How bad is the pain?\"  (e.g., Scale 1-10; mild, moderate, or severe)      6/10  7. RECURRENT SYMPTOM: \"Have you ever had this type of stomach pain before?\" If Yes, ask: \"When was the last time?\" and \"What happened that time?\"       No   8. CAUSE: \"What do you think is causing the stomach pain?\"      Unsure   9. RELIEVING/AGGRAVATING FACTORS: \"What makes it better or worse?\" (e.g., antacids, bending or twisting motion, bowel movement)      More painful when she coughs. She states she has an acid reflex issue that makes her cough all the time.   10. OTHER SYMPTOMS: \"Do you have any other symptoms?\" (e.g., back pain, diarrhea, fever, urination pain, vomiting)        Passes BM once weekly, no pain with passing BMs or blood stool. She confirms this is her usual scheduled for passing BMS and she has had constipation her whole life.     Denies vaginal irritation or discharge. Notes there is a different odor.   11. PREGNANCY: \"Is there any chance you are pregnant?\" \"When was your last menstrual period?\"        no    Answer Assessment - Initial Assessment Questions  1. RESPIRATORY STATUS: \"Describe your breathing?\" (e.g., wheezing, shortness of breath, unable to speak, severe coughing)       Nasal congestion   2. ONSET: \"When did this breathing problem begin?\"       1 month   3. PATTERN \"Does the difficult breathing come and go, or has it been constant since it started?\"       Intermittent   4. SEVERITY: \"How bad " "is your breathing?\" (e.g., mild, moderate, severe)       Mild   5. RECURRENT SYMPTOM: \"Have you had difficulty breathing before?\" If Yes, ask: \"When was the last time?\" and \"What happened that time?\"       no  6. CARDIAC HISTORY: \"Do you have any history of heart disease?\" (e.g., heart attack, angina, bypass surgery, angioplasty)       No   7. LUNG HISTORY: \"Do you have any history of lung disease?\"  (e.g., pulmonary embolus, asthma, emphysema)      No   8. CAUSE: \"What do you think is causing the breathing problem?\"       Unsure, possibly sinus infection  9. OTHER SYMPTOMS: \"Do you have any other symptoms?\" (e.g., chest pain, cough, dizziness, fever, runny nose)      Pain in right nostril. Dried blood. Cough however she states that is intermittent and from reflux   10. O2 SATURATION MONITOR:  \"Do you use an oxygen saturation monitor (pulse oximeter) at home?\" If Yes, ask: \"What is your reading (oxygen level) today?\" \"What is your usual oxygen saturation reading?\" (e.g., 95%)        No. She denies shortness of breath or difficulty breathing   11. PREGNANCY: \"Is there any chance you are pregnant?\" \"When was your last menstrual period?\"        no  12. 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 No known exposure to influenza.    Protocols used: Abdominal Pain - Female-A-OH, Breathing Difficulty-A-OH  Offered to schedule with another provider at [facility]. Pt advised to contact the clinic should symptoms change or worsen.   Pt verbalizes understand and agrees with plan. Scheduled with PCP on 3/13/25 per pt preference to wait to be seen by PCP.     Heidi Vance RN on 3/6/2025 at 2:03 PM   "

## 2025-03-06 NOTE — TELEPHONE ENCOUNTER
Third attempt. Left message for patient to return call to clinic per Shaina Mei MD as noted:     Please triage to see if this is appropriate for same-day or ADS in regards to the abdominal pain. There are SD available this week.      Patient to be triaged for Abdominal pain, possible sinus infection.      Multiple attempts made to reach patient about appointment request. Closing encounter.   RN, if/when patient returns call, please review message as shown. Clay Puente RN, BSN

## 2025-03-13 ENCOUNTER — OFFICE VISIT (OUTPATIENT)
Dept: FAMILY MEDICINE | Facility: CLINIC | Age: 50
End: 2025-03-13
Payer: COMMERCIAL

## 2025-03-13 ENCOUNTER — ANCILLARY PROCEDURE (OUTPATIENT)
Dept: GENERAL RADIOLOGY | Facility: CLINIC | Age: 50
End: 2025-03-13
Attending: INTERNAL MEDICINE
Payer: COMMERCIAL

## 2025-03-13 VITALS
WEIGHT: 193.8 LBS | HEIGHT: 64 IN | TEMPERATURE: 98.4 F | BODY MASS INDEX: 33.09 KG/M2 | RESPIRATION RATE: 16 BRPM | OXYGEN SATURATION: 99 % | HEART RATE: 73 BPM | SYSTOLIC BLOOD PRESSURE: 142 MMHG | DIASTOLIC BLOOD PRESSURE: 89 MMHG

## 2025-03-13 DIAGNOSIS — F32.1 CURRENT MODERATE EPISODE OF MAJOR DEPRESSIVE DISORDER WITHOUT PRIOR EPISODE (H): ICD-10-CM

## 2025-03-13 DIAGNOSIS — K59.09 CHRONIC CONSTIPATION: ICD-10-CM

## 2025-03-13 DIAGNOSIS — E66.811 CLASS 1 OBESITY DUE TO EXCESS CALORIES WITHOUT SERIOUS COMORBIDITY WITH BODY MASS INDEX (BMI) OF 33.0 TO 33.9 IN ADULT: ICD-10-CM

## 2025-03-13 DIAGNOSIS — L65.9 HAIR LOSS: ICD-10-CM

## 2025-03-13 DIAGNOSIS — R10.32 LLQ ABDOMINAL PAIN: Primary | ICD-10-CM

## 2025-03-13 DIAGNOSIS — R10.32 LLQ ABDOMINAL PAIN: ICD-10-CM

## 2025-03-13 DIAGNOSIS — E66.09 CLASS 1 OBESITY DUE TO EXCESS CALORIES WITHOUT SERIOUS COMORBIDITY WITH BODY MASS INDEX (BMI) OF 33.0 TO 33.9 IN ADULT: ICD-10-CM

## 2025-03-13 LAB
BASOPHILS # BLD AUTO: 0 10E3/UL (ref 0–0.2)
BASOPHILS NFR BLD AUTO: 0 %
EOSINOPHIL # BLD AUTO: 0.1 10E3/UL (ref 0–0.7)
EOSINOPHIL NFR BLD AUTO: 1 %
ERYTHROCYTE [DISTWIDTH] IN BLOOD BY AUTOMATED COUNT: 13.1 % (ref 10–15)
HCT VFR BLD AUTO: 41.2 % (ref 35–47)
HGB BLD-MCNC: 12.9 G/DL (ref 11.7–15.7)
IMM GRANULOCYTES # BLD: 0 10E3/UL
IMM GRANULOCYTES NFR BLD: 0 %
LYMPHOCYTES # BLD AUTO: 1.1 10E3/UL (ref 0.8–5.3)
LYMPHOCYTES NFR BLD AUTO: 23 %
MCH RBC QN AUTO: 27.9 PG (ref 26.5–33)
MCHC RBC AUTO-ENTMCNC: 31.3 G/DL (ref 31.5–36.5)
MCV RBC AUTO: 89 FL (ref 78–100)
MONOCYTES # BLD AUTO: 0.4 10E3/UL (ref 0–1.3)
MONOCYTES NFR BLD AUTO: 8 %
NEUTROPHILS # BLD AUTO: 3.4 10E3/UL (ref 1.6–8.3)
NEUTROPHILS NFR BLD AUTO: 67 %
PLATELET # BLD AUTO: 219 10E3/UL (ref 150–450)
RBC # BLD AUTO: 4.63 10E6/UL (ref 3.8–5.2)
WBC # BLD AUTO: 5 10E3/UL (ref 4–11)

## 2025-03-13 ASSESSMENT — PATIENT HEALTH QUESTIONNAIRE - PHQ9
SUM OF ALL RESPONSES TO PHQ QUESTIONS 1-9: 11
10. IF YOU CHECKED OFF ANY PROBLEMS, HOW DIFFICULT HAVE THESE PROBLEMS MADE IT FOR YOU TO DO YOUR WORK, TAKE CARE OF THINGS AT HOME, OR GET ALONG WITH OTHER PEOPLE: SOMEWHAT DIFFICULT
SUM OF ALL RESPONSES TO PHQ QUESTIONS 1-9: 11

## 2025-03-13 ASSESSMENT — PAIN SCALES - GENERAL: PAINLEVEL_OUTOF10: MODERATE PAIN (4)

## 2025-03-13 NOTE — PROGRESS NOTES
Assessment & Plan     LLQ abdominal pain:  - Pain is constant, worsens with coughing and bending. Positional nature suggests compression in the area. Differential diagnosis includes constipation or muscle-related issues.  - Order blood work to check for inflammation or infection. Perform an abdominal X-ray. If blood work suggests inflammation, proceed with an abdominal CT.    Chronic constipation:  - Constipation may be contributing to LLQ abdominal pain.  - Monitor results from blood work and X-ray before starting any new medication like Wegovy, as it may worsen constipation.  - Add Miralax if no other intraabdominal pathology.     Hair loss:  - Hair loss noted, possibly related to thyroid function.  - Check thyroid function as part of the assessment.      Class 1 obesity due to excess calories without serious comorbidity with body mass index (BMI) of 33.0 to 33.9 in adult  - Patient interested in GLP-1. Defer until abdominal pain isssue resolves.    Has return visit in 2 months.    Chart documentation was done in part with Dragon Voice Recognition software and AI tools. Although reviewed after completion, some word and grammatical errors may remain.             Depression Screening Follow Up        2/8/2021     2:41 PM 8/15/2024     3:54 PM 3/13/2025     1:02 PM   PHQ-9 SCORE   PHQ-9 Total Score [employer]  13 (Moderate depression) 11 (Moderate depression)   PHQ-9 Total Score 0 13 11        Patient-reported       Previously declined therapy and medication. Depression action plan sent via [employer].        Teo Johnson is a 49 year old, presenting for the following health issues:  Urinary Problem, URI, and Musculoskeletal Problem (Nasal congestion/Vaginal order/LLQ abdominal pain)        3/13/2025     1:18 PM   Additional Questions   Roomed by Faith DIOR   Accompanied by self     History of Present Illness       Reason for visit:  Pain  Symptom onset:  1-2 weeks ago  Symptoms include:  Pain in my abdomen  Symptom  "intensity:  Moderate  Symptom progression:  Staying the same  Had these symptoms before:  No  What makes it worse:  Cough  What makes it better:  Laying down   She is taking medications regularly.    - Persistent lower side pain, constant but worsens with coughing and bending.  - Pain intensity rated 4/10, increases to 7/10 with coughing.  - Constipation, bowel movements once a week, pain during bowel movements with slight relief afterward.  - Pain onset occurred suddenly one evening, no known trigger.  - No pain during sleep, slight relief when lying down.  - No fever or chills reported.  - Experiencing hair loss, noticed during washing.  - Nasal congestion for about six weeks, dry crusts, sometimes slightly bloody, no runny nose or sinus pressure.          Review of Systems  Constitutional, HEENT, cardiovascular, pulmonary, gi and gu systems are negative, except as otherwise noted.      Objective    BP (!) 142/89 (BP Location: Right arm, Patient Position: Sitting, Cuff Size: Adult Large)   Pulse 73   Temp 98.4  F (36.9  C) (Oral)   Resp 16   Ht 1.613 m (5' 3.5\")   Wt 87.9 kg (193 lb 12.8 oz)   LMP 08/03/2021 (LMP Unknown)   SpO2 99%   BMI 33.79 kg/m    Body mass index is 33.79 kg/m .  Physical Exam   GENERAL: alert and no distress  ABDOMEN: soft, bowel sounds normal, positive for tenderness in the left lower quadrant, no rebound    Results for orders placed or performed in visit on 03/13/25 (from the past 24 hours)   CBC with platelets and differential    Narrative    The following orders were created for panel order CBC with platelets and differential.  Procedure                               Abnormality         Status                     ---------                               -----------         ------                     CBC with platelets and ...[[id number]]  Abnormal            Final result                 Please view results for these tests on the individual orders.   CBC with platelets and " differential   Result Value Ref Range    WBC Count 5.0 4.0 - 11.0 10e3/uL    RBC Count 4.63 3.80 - 5.20 10e6/uL    Hemoglobin 12.9 11.7 - 15.7 g/dL    Hematocrit 41.2 35.0 - 47.0 %    MCV 89 78 - 100 fL    MCH 27.9 26.5 - 33.0 pg    MCHC 31.3 (L) 31.5 - 36.5 g/dL    RDW 13.1 10.0 - 15.0 %    Platelet Count 219 150 - 450 10e3/uL    % Neutrophils 67 %    % Lymphocytes 23 %    % Monocytes 8 %    % Eosinophils 1 %    % Basophils 0 %    % Immature Granulocytes 0 %    Absolute Neutrophils 3.4 1.6 - 8.3 10e3/uL    Absolute Lymphocytes 1.1 0.8 - 5.3 10e3/uL    Absolute Monocytes 0.4 0.0 - 1.3 10e3/uL    Absolute Eosinophils 0.1 0.0 - 0.7 10e3/uL    Absolute Basophils 0.0 0.0 - 0.2 10e3/uL    Absolute Immature Granulocytes 0.0 <=0.4 10e3/uL           Signed Electronically by: Shaina Mei MD PhD

## 2025-03-13 NOTE — LETTER
My Depression Action Plan  Name: Elizabeth Rivero   Date of Birth 1975  Date: 3/13/2025    My doctor: Shaina Mei   My clinic: [address] [address] [phone]            GREEN    ZONE   Good Control    What it looks like:   Things are going generally well. You have normal up s and down s. You may even feel depressed from time to time, but bad moods usually last less than a day.   What you need to do:  Continue to care for yourself (see self care plan)  Check your depression survival kit and update it as needed  Follow your physician s recommendations including any medication.  Do not stop taking medication unless you consult with your physician first.             YELLOW         ZONE Getting Worse    What it looks like:   Depression is starting to interfere with your life.   It may be hard to get out of bed; you may be starting to isolate yourself from others.  Symptoms of depression are starting to last most all day and this has happened for several days.   You may have suicidal thoughts but they are not constant.   What you need to do:     Call your care team, your response to treatment will improve if you keep your care team informed of your progress. Yellow periods are signs an adjustment may need to be made.     Continue your self-care, even if you have to fake it!    Talk to someone in your support network    Open up your depression survival kit             RED    ZONE Medical Alert - Get Help    What it looks like:   Depression is seriously interfering with your life.   You may experience these or other symptoms: You can t get out of bed most days, can t work or engage in other necessary activities, you have trouble taking care of basic hygiene, or basic responsibilities, thoughts of suicide or death that will not go away, self-injurious behavior.     What you need to do:  Call your care team and request a same-day appointment. If they are  not available (weekends or after hours) call your local crisis line, emergency room or 911.      Electronically signed by: Shaina Mei MD PhD, March 13, 2025    Depression Self Care Plan / Survival Kit    Self-Care for Depression  Here s the deal. Your body and mind are really not as separate as most people think.  What you do and think affects how you feel and how you feel influences what you do and think. This means if you do things that people who feel good do, it will help you feel better.  Sometimes this is all it takes.  There is also a place for medication and therapy depending on how severe your depression is, so be sure to consult with your medical provider and/ or Behavioral Health Consultant if your symptoms are worsening or not improving.     In order to better manage my stress, I will:    Exercise  Get some form of exercise, every day. This will help reduce pain and release endorphins, the  feel good  chemicals in your brain. This is almost as good as taking antidepressants!  This is not the same as joining a gym and then never going! (they count on that by the way ) It can be as simple as just going for a walk or doing some gardening, anything that will get you moving.      Hygiene   Maintain good hygiene (Get out of bed in the morning, Make your bed, Brush your teeth, Take a shower, and Get dressed like you were going to work, even if you are unemployed).  If your clothes don't fit try to get ones that do.    Diet  I will strive to eat foods that are good for me, drink plenty of water, and avoid excessive sugar, caffeine, alcohol, and other mood-altering substances.  Some foods that are helpful in depression are: complex carbohydrates, B vitamins, flaxseed, fish or fish oil, fresh fruits and vegetables.    Psychotherapy  I agree to participate in Individual Therapy (if recommended).    Medication  If prescribed medications, I agree to take them.  Missing doses can result in serious side effects.  I  understand that drinking alcohol, or other illicit drug use, may cause potential side effects.  I will not stop my medication abruptly without first discussing it with my provider.    Staying Connected With Others  I will stay in touch with my friends, family members, and my primary care provider/team.    Use your imagination  Be creative.  We all have a creative side; it doesn t matter if it s oil painting, sand castles, or mud pies! This will also kick up the endorphins.    Witness Beauty  (AKA stop and smell the roses) Take a look outside, even in mid-winter. Notice colors, textures. Watch the squirrels and birds.     Service to others  Be of service to others.  There is always someone else in need.  By helping others we can  get out of ourselves  and remember the really important things.  This also provides opportunities for practicing all the other parts of the program.    Humor  Laugh and be silly!  Adjust your TV habits for less news and crime-drama and more comedy.    Control your stress  Try breathing deep, massage therapy, biofeedback, and meditation. Find time to relax each day.     My support system    Clinic Contact:  Phone number:    Contact 1:  Phone number:    Contact 2:  Phone number:    [facility]/:  Phone number:    Therapist:  Phone number:    Local crisis center:    Phone number:    Other community support:  Phone number:

## 2025-08-19 ENCOUNTER — ANCILLARY PROCEDURE (OUTPATIENT)
Dept: GENERAL RADIOLOGY | Facility: CLINIC | Age: 50
End: 2025-08-19
Attending: FAMILY MEDICINE
Payer: COMMERCIAL

## 2025-08-19 ENCOUNTER — OFFICE VISIT (OUTPATIENT)
Dept: FAMILY MEDICINE | Facility: CLINIC | Age: 50
End: 2025-08-19
Payer: COMMERCIAL

## 2025-08-19 DIAGNOSIS — M54.16 LUMBAR RADICULOPATHY: ICD-10-CM

## 2025-08-19 PROCEDURE — 72100 X-RAY EXAM L-S SPINE 2/3 VWS: CPT | Mod: TC | Performed by: RADIOLOGY

## 2025-08-19 SDOH — HEALTH STABILITY: PHYSICAL HEALTH: ON AVERAGE, HOW MANY MINUTES DO YOU ENGAGE IN EXERCISE AT THIS LEVEL?: 60 MIN

## 2025-08-19 SDOH — HEALTH STABILITY: PHYSICAL HEALTH: ON AVERAGE, HOW MANY DAYS PER WEEK DO YOU ENGAGE IN MODERATE TO STRENUOUS EXERCISE (LIKE A BRISK WALK)?: 1 DAY

## 2025-08-19 ASSESSMENT — ANXIETY QUESTIONNAIRES
3. WORRYING TOO MUCH ABOUT DIFFERENT THINGS: MORE THAN HALF THE DAYS
5. BEING SO RESTLESS THAT IT IS HARD TO SIT STILL: NEARLY EVERY DAY
2. NOT BEING ABLE TO STOP OR CONTROL WORRYING: NEARLY EVERY DAY
7. FEELING AFRAID AS IF SOMETHING AWFUL MIGHT HAPPEN: NEARLY EVERY DAY
5. BEING SO RESTLESS THAT IT IS HARD TO SIT STILL: NEARLY EVERY DAY
1. FEELING NERVOUS, ANXIOUS, OR ON EDGE: MORE THAN HALF THE DAYS
6. BECOMING EASILY ANNOYED OR IRRITABLE: NOT AT ALL
GAD7 TOTAL SCORE: 16
GAD7 TOTAL SCORE: 16
2. NOT BEING ABLE TO STOP OR CONTROL WORRYING: NEARLY EVERY DAY
3. WORRYING TOO MUCH ABOUT DIFFERENT THINGS: MORE THAN HALF THE DAYS
1. FEELING NERVOUS, ANXIOUS, OR ON EDGE: MORE THAN HALF THE DAYS
GAD7 TOTAL SCORE: 16
6. BECOMING EASILY ANNOYED OR IRRITABLE: NOT AT ALL
4. TROUBLE RELAXING: NEARLY EVERY DAY
7. FEELING AFRAID AS IF SOMETHING AWFUL MIGHT HAPPEN: NEARLY EVERY DAY

## 2025-08-19 ASSESSMENT — PATIENT HEALTH QUESTIONNAIRE - PHQ9
SUM OF ALL RESPONSES TO PHQ QUESTIONS 1-9: 13
10. IF YOU CHECKED OFF ANY PROBLEMS, HOW DIFFICULT HAVE THESE PROBLEMS MADE IT FOR YOU TO DO YOUR WORK, TAKE CARE OF THINGS AT HOME, OR GET ALONG WITH OTHER PEOPLE: SOMEWHAT DIFFICULT
SUM OF ALL RESPONSES TO PHQ QUESTIONS 1-9: 13
5. POOR APPETITE OR OVEREATING: NEARLY EVERY DAY

## 2025-08-19 ASSESSMENT — PAIN SCALES - GENERAL: PAINLEVEL_OUTOF10: MODERATE PAIN (5)

## 2025-08-19 ASSESSMENT — SOCIAL DETERMINANTS OF HEALTH (SDOH): HOW OFTEN DO YOU GET TOGETHER WITH FRIENDS OR RELATIVES?: NEVER

## 2025-08-20 ENCOUNTER — PATIENT OUTREACH (OUTPATIENT)
Dept: CARE COORDINATION | Facility: CLINIC | Age: 50
End: 2025-08-20
Payer: COMMERCIAL

## 2025-08-31 PROBLEM — K21.9 GASTROESOPHAGEAL REFLUX DISEASE: Status: ACTIVE | Noted: 2025-08-31

## 2025-08-31 PROBLEM — K21.9 GASTROESOPHAGEAL REFLUX DISEASE: Status: RESOLVED | Noted: 2025-08-31 | Resolved: 2025-08-31

## 2025-09-01 ENCOUNTER — PATIENT OUTREACH (OUTPATIENT)
Dept: CARE COORDINATION | Facility: CLINIC | Age: 50
End: 2025-09-01
Payer: COMMERCIAL

## 2025-09-03 ENCOUNTER — TELEPHONE (OUTPATIENT)
Dept: FAMILY MEDICINE | Facility: CLINIC | Age: 50
End: 2025-09-03
Payer: COMMERCIAL

## 2025-09-03 ENCOUNTER — PATIENT OUTREACH (OUTPATIENT)
Dept: CARE COORDINATION | Facility: CLINIC | Age: 50
End: 2025-09-03
Payer: COMMERCIAL

## (undated) DEVICE — SOL NACL 0.9% IRRIG 1000ML BOTTLE 2F7124

## (undated) DEVICE — TUBING SYS AQUILEX BLUE INFLOW AQL-110 YLW OUTFLOW AQL-111

## (undated) DEVICE — CATH TRAY FOLEY SURESTEP 16FR WDRAIN BAG STLK LATEX A300316A

## (undated) DEVICE — BLADE CLIPPER 4406

## (undated) DEVICE — SOL NACL 0.9% INJ 250ML BAG 2B1322Q

## (undated) DEVICE — PREP DURAPREP 26ML APL 8630

## (undated) DEVICE — GLOVE PROTEXIS BLUE W/NEU-THERA 7.0  2D73EB70

## (undated) DEVICE — KIT HYSTEROSCOPIC 7209827

## (undated) DEVICE — SU VICRYL 3-0 SH 27" J316H

## (undated) DEVICE — GLOVE PROTEXIS MICRO 7.0  2D73PM70

## (undated) DEVICE — GLOVE PROTEXIS W/NEU-THERA 6.0  2D73TE60

## (undated) DEVICE — SOL NACL 0.9% INJ 1000ML BAG 2B1324X

## (undated) DEVICE — SU VICRYL 0 UR-6 27" J603H

## (undated) DEVICE — SUCTION CANISTER MEDIVAC LINER 3000ML W/LID 65651-530

## (undated) DEVICE — SOL NACL 0.9% IRRIG 1000ML BOTTLE 07138-09

## (undated) DEVICE — LINEN TOWEL PACK X5 5464

## (undated) DEVICE — SPONGE LAP 18X18" X8435

## (undated) DEVICE — SEAL SET MYOSURE ROD LENS SCOPE SINGLE USE 40-902

## (undated) DEVICE — Device

## (undated) DEVICE — SUCTION CANISTER BEMIS HI FLOW 006772-901

## (undated) DEVICE — SYR EAR BULB 3OZ 0035830

## (undated) DEVICE — DECANTER VIAL 2006S

## (undated) DEVICE — PREP SKIN SCRUB TRAY 4461A

## (undated) DEVICE — SOL WATER IRRIG 1000ML BOTTLE 2F7114

## (undated) DEVICE — GLOVE PROTEXIS W/NEU-THERA 6.5  2D73TE65

## (undated) DEVICE — ESU HANDPIECE BIPOLAR THUNDERBEAT FC 5MMX35CM TB-0535FC

## (undated) DEVICE — WIPES FOLEY CARE SURESTEP PROVON DFC100

## (undated) DEVICE — SPONGE RAY-TEC 4X8" 7318

## (undated) DEVICE — SYR 10ML FINGER CONTROL W/O NDL 309695

## (undated) DEVICE — PACK TVT HYSTEROSCOPY SMA15HYFSE

## (undated) DEVICE — DRAPE IOBAN INCISE 23X17" 6650EZ

## (undated) DEVICE — ESU GROUND PAD UNIVERSAL W/O CORD

## (undated) DEVICE — KIT ABDOMINAL HYST SMA15AHFSM

## (undated) DEVICE — NDL SPINAL 22GA 3.5" QUINCKE 405181

## (undated) DEVICE — CATH INTERMITTENT CLEAN-CATH FEMALE 14FR 6" VINYL LF 420614

## (undated) DEVICE — DECANTER BAG 2002S

## (undated) DEVICE — SU MONOCRYL 4-0 PS-2 18" UND Y496G

## (undated) DEVICE — SYSTEM CLEARIFY VISUALIZATION 21-345

## (undated) DEVICE — SURGICEL POWDER ABSORBABLE HEMOSTAT 3GM 3013SP

## (undated) DEVICE — SU CHROMIC 3-0 SH 27" G122H

## (undated) DEVICE — TUBING C02 INSUFFLATION LAP FILTER HEATER 6198

## (undated) DEVICE — BLADE KNIFE SURG 15C 371716

## (undated) DEVICE — NDL 19GA 1.5"

## (undated) DEVICE — PAD CHUX UNDERPAD 23X24" 7136

## (undated) DEVICE — ESU ELEC BLADE 6" COATED E1450-6

## (undated) DEVICE — SU PDS II 0 CTX 60" Z990G

## (undated) DEVICE — ENDO MORCELLATOR OLYMPUS PNEUMOLINER WA90500US

## (undated) DEVICE — EVAC SYSTEM CLEAR FLOW SC082500

## (undated) DEVICE — DRSG STERI STRIP 1/2X4" R1547

## (undated) DEVICE — DRSG TELFA 3X8" 1238

## (undated) DEVICE — SOL WATER IRRIG 3000ML BAG 2B7117

## (undated) DEVICE — DRAIN JACKSON PRATT 10MM FLAT 3/4 PERF

## (undated) DEVICE — ENDO TROCAR FIRST ENTRY KII FIOS ADV FIX 05X100MM CFF03

## (undated) DEVICE — SUCTION IRR STRYKERFLOW II W/TIP 250-070-520

## (undated) DEVICE — ESU HOLDER LAP INST DISP PURPLE LONG 330MM H-PRO-330

## (undated) DEVICE — DRSG TELFA ISLAND 4X10"

## (undated) DEVICE — PREP CHLORAPREP 26ML TINTED ORANGE  260815

## (undated) DEVICE — ENDO TROCAR SLEEVE KII ADV FIXATION 05X100MM CFS02

## (undated) DEVICE — SU VICRYL 2-0 CT-1 27" J339H

## (undated) DEVICE — SOL NACL 0.9% IRRIG 3000ML BAG 2B7477

## (undated) DEVICE — KIT SURGICAL TURNOVER FVSD-01D

## (undated) DEVICE — SYR 30ML LL W/O NDL

## (undated) DEVICE — KIT ENDO FIRST STEP DISINFECTANT 200ML W/POUCH EP-4

## (undated) DEVICE — SU PLAIN 2-0 CT 27" 853H

## (undated) DEVICE — GLOVE PROTEXIS W/NEU-THERA 7.0  2D73TE70

## (undated) DEVICE — DRSG ABDOMINAL 07 1/2X8" 7197D

## (undated) DEVICE — SU VICRYL 0 CT-1 27" J340H

## (undated) DEVICE — ESU LIGASURE IMPACT OPEN SEALER/DVDR CVD LG JAW LF4418

## (undated) DEVICE — SYR 03ML LL W/O NDL 309657

## (undated) RX ORDER — HYDROMORPHONE HYDROCHLORIDE 1 MG/ML
INJECTION, SOLUTION INTRAMUSCULAR; INTRAVENOUS; SUBCUTANEOUS
Status: DISPENSED
Start: 2021-08-18

## (undated) RX ORDER — PROPOFOL 10 MG/ML
INJECTION, EMULSION INTRAVENOUS
Status: DISPENSED
Start: 2021-08-18

## (undated) RX ORDER — FENTANYL CITRATE 50 UG/ML
INJECTION, SOLUTION INTRAMUSCULAR; INTRAVENOUS
Status: DISPENSED
Start: 2021-08-18

## (undated) RX ORDER — KETOROLAC TROMETHAMINE 30 MG/ML
INJECTION, SOLUTION INTRAMUSCULAR; INTRAVENOUS
Status: DISPENSED
Start: 2018-11-28

## (undated) RX ORDER — ONDANSETRON 2 MG/ML
INJECTION INTRAMUSCULAR; INTRAVENOUS
Status: DISPENSED
Start: 2018-11-28

## (undated) RX ORDER — ONDANSETRON 2 MG/ML
INJECTION INTRAMUSCULAR; INTRAVENOUS
Status: DISPENSED
Start: 2021-08-18

## (undated) RX ORDER — FENTANYL CITRATE 50 UG/ML
INJECTION, SOLUTION INTRAMUSCULAR; INTRAVENOUS
Status: DISPENSED
Start: 2018-11-28

## (undated) RX ORDER — PHENAZOPYRIDINE HYDROCHLORIDE 200 MG/1
TABLET, FILM COATED ORAL
Status: DISPENSED
Start: 2021-08-18

## (undated) RX ORDER — ACETAMINOPHEN 325 MG/1
TABLET ORAL
Status: DISPENSED
Start: 2019-08-21

## (undated) RX ORDER — PROPOFOL 10 MG/ML
INJECTION, EMULSION INTRAVENOUS
Status: DISPENSED
Start: 2018-11-28

## (undated) RX ORDER — FENTANYL CITRATE 0.05 MG/ML
INJECTION, SOLUTION INTRAMUSCULAR; INTRAVENOUS
Status: DISPENSED
Start: 2021-08-18

## (undated) RX ORDER — ONDANSETRON 2 MG/ML
INJECTION INTRAMUSCULAR; INTRAVENOUS
Status: DISPENSED
Start: 2022-07-05

## (undated) RX ORDER — KETOROLAC TROMETHAMINE 15 MG/ML
INJECTION, SOLUTION INTRAMUSCULAR; INTRAVENOUS
Status: DISPENSED
Start: 2021-08-18

## (undated) RX ORDER — GLYCOPYRROLATE 0.2 MG/ML
INJECTION, SOLUTION INTRAMUSCULAR; INTRAVENOUS
Status: DISPENSED
Start: 2021-08-18

## (undated) RX ORDER — ACETAMINOPHEN 325 MG/1
TABLET ORAL
Status: DISPENSED
Start: 2021-08-18

## (undated) RX ORDER — LIDOCAINE HYDROCHLORIDE 20 MG/ML
INJECTION, SOLUTION EPIDURAL; INFILTRATION; INTRACAUDAL; PERINEURAL
Status: DISPENSED
Start: 2021-08-18

## (undated) RX ORDER — CEFAZOLIN SODIUM 2 G/100ML
INJECTION, SOLUTION INTRAVENOUS
Status: DISPENSED
Start: 2018-11-28

## (undated) RX ORDER — PROPOFOL 10 MG/ML
INJECTION, EMULSION INTRAVENOUS
Status: DISPENSED
Start: 2019-08-21

## (undated) RX ORDER — FENTANYL CITRATE 50 UG/ML
INJECTION, SOLUTION INTRAMUSCULAR; INTRAVENOUS
Status: DISPENSED
Start: 2022-07-05

## (undated) RX ORDER — HYDROMORPHONE HCL IN WATER/PF 6 MG/30 ML
PATIENT CONTROLLED ANALGESIA SYRINGE INTRAVENOUS
Status: DISPENSED
Start: 2021-08-18

## (undated) RX ORDER — HYDROXYZINE HYDROCHLORIDE 25 MG/1
TABLET, FILM COATED ORAL
Status: DISPENSED
Start: 2018-11-28

## (undated) RX ORDER — CEFAZOLIN SODIUM 2 G/100ML
INJECTION, SOLUTION INTRAVENOUS
Status: DISPENSED
Start: 2021-08-18

## (undated) RX ORDER — FENTANYL CITRATE 50 UG/ML
INJECTION, SOLUTION INTRAMUSCULAR; INTRAVENOUS
Status: DISPENSED
Start: 2019-08-21

## (undated) RX ORDER — ACETAMINOPHEN 325 MG/1
TABLET ORAL
Status: DISPENSED
Start: 2018-11-28

## (undated) RX ORDER — ALBUMIN, HUMAN INJ 5% 5 %
SOLUTION INTRAVENOUS
Status: DISPENSED
Start: 2021-08-18

## (undated) RX ORDER — DEXAMETHASONE SODIUM PHOSPHATE 4 MG/ML
INJECTION, SOLUTION INTRA-ARTICULAR; INTRALESIONAL; INTRAMUSCULAR; INTRAVENOUS; SOFT TISSUE
Status: DISPENSED
Start: 2021-08-18

## (undated) RX ORDER — DEXAMETHASONE SODIUM PHOSPHATE 4 MG/ML
INJECTION, SOLUTION INTRA-ARTICULAR; INTRALESIONAL; INTRAMUSCULAR; INTRAVENOUS; SOFT TISSUE
Status: DISPENSED
Start: 2018-11-28